# Patient Record
Sex: MALE | Race: WHITE | NOT HISPANIC OR LATINO | Employment: FULL TIME | URBAN - METROPOLITAN AREA
[De-identification: names, ages, dates, MRNs, and addresses within clinical notes are randomized per-mention and may not be internally consistent; named-entity substitution may affect disease eponyms.]

---

## 2017-03-24 ENCOUNTER — OFFICE VISIT (OUTPATIENT)
Dept: URGENT CARE | Facility: URGENT CARE | Age: 27
End: 2017-03-24
Payer: COMMERCIAL

## 2017-03-24 VITALS
WEIGHT: 186 LBS | HEART RATE: 65 BPM | SYSTOLIC BLOOD PRESSURE: 133 MMHG | TEMPERATURE: 98.4 F | DIASTOLIC BLOOD PRESSURE: 83 MMHG | OXYGEN SATURATION: 99 % | BODY MASS INDEX: 25.94 KG/M2

## 2017-03-24 DIAGNOSIS — H66.005 RECURRENT ACUTE SUPPURATIVE OTITIS MEDIA WITHOUT SPONTANEOUS RUPTURE OF LEFT TYMPANIC MEMBRANE: Primary | ICD-10-CM

## 2017-03-24 DIAGNOSIS — H72.91 TYMPANIC MEMBRANE PERFORATION, RIGHT: ICD-10-CM

## 2017-03-24 DIAGNOSIS — J01.90 ACUTE SINUSITIS WITH SYMPTOMS > 10 DAYS: ICD-10-CM

## 2017-03-24 PROCEDURE — 99214 OFFICE O/P EST MOD 30 MIN: CPT | Performed by: FAMILY MEDICINE

## 2017-03-24 NOTE — PROGRESS NOTES
SUBJECTIVE:                                                    Julio Oh is a 26 year old male who presents to clinic today for the following health issues:      Ear Problem      Duration: 1 week     Description  ear pain left    Severity: moderate    Accompanying signs and symptoms: None    History (predisposing factors):  none    Precipitating or alleviating factors: None    Therapies tried and outcome:  none     Started 3 weeks ago started having some congestion runny nose and fatigue  Thought went away although still having some congestion  But then the last week the left ear started to hurt and feel plugged and can't hear it out of it  No drainage    Tried over the counter medications without relief  No  chest pain or shortness of breath   No rash  Ill-contacts: none   Because of persistent and worsening symptoms came in to be seen    Problem list and histories reviewed & adjusted, as indicated.  Additional history: as documented    Problem list, Medication list, Allergies, and Medical/Social/Surgical histories reviewed in EPIC and updated as appropriate.    ROS:  Constitutional, HEENT, cardiovascular, pulmonary, gi and gu systems are negative, except as otherwise noted.    OBJECTIVE:                                                    /83  Pulse 65  Temp 98.4  F (36.9  C) (Oral)  Wt 186 lb (84.4 kg)  SpO2 99%  BMI 25.94 kg/m2  Body mass index is 25.94 kg/(m^2).  GENERAL: healthy, alert and no distress  EYES: pink palpebral conjunctiva, anicteric sclera, pupils equally reactive to light and accomodation, extraocular muscles intact full and equal.  ENT: midline nasal septum, positive  nasal congestion   Left ear:no tragal tenderness, no mastoid tenderness dull, erythematous and yellowish effusion tympaninc membrane   Right ear: no tragal tenderness, no mastoid tenderness yellow, distorted and scarred tympaninc membrane   NECK: no adenopathy, no asymmetry or  masses  RESP: lungs clear to  auscultation - no rales, rhonchi or wheezes  CV: regular rate and rhythm, normal S1 S2, no S3 or S4, no murmur, click or rub, no peripheral edema and peripheral pulses strong  ABDOMEN: soft, nontender, no hepatosplenomegaly, no masses and bowel sounds normal  MS: no gross musculoskeletal defects noted, no edema  NEURO: Normal strength and tone, mentation intact and speech normal    Diagnostic Test Results:  No results found for this or any previous visit (from the past 24 hour(s)).     ASSESSMENT/PLAN:                                                      No diagnosis found.      ICD-10-CM    1. Recurrent acute suppurative otitis media without spontaneous rupture of left tympanic membrane H66.005 amoxicillin-clavulanate (AUGMENTIN) 875-125 MG per tablet   2. Tympanic membrane perforation, right H72.91    3. Acute sinusitis with symptoms > 10 days J01.90 amoxicillin-clavulanate (AUGMENTIN) 875-125 MG per tablet     Prescribed with augmentin  Side effects discussed warned about GI side effects and risk of cdiff.  Recommend follow up with his ENT for his TYMPANINC MEMBRANE perforation. Per patient he is due for follow up.    Needs ear recheck  in 2-4 weeks  Adverse reactions of medications discussed.  Over the counter medications discussed.   Aware to come back in if with worsening symptoms or if no relief despite treatment plan  Patient voiced understanding and had no further questions.     MD Rina Sarabia MD  [facility]

## 2017-03-24 NOTE — NURSING NOTE
"Chief Complaint   Patient presents with     Ear Problem       Initial /83  Pulse 65  Temp 98.4  F (36.9  C) (Oral)  Wt 186 lb (84.4 kg)  SpO2 99%  BMI 25.94 kg/m2 Estimated body mass index is 25.94 kg/(m^2) as calculated from the following:    Height as of 12/27/16: 5' 11\" (1.803 m).    Weight as of this encounter: 186 lb (84.4 kg).  BP completed using cuff size: nelson HERNANDEZ CMA ([facility])  6:00 PM 3/24/2017    "

## 2017-03-24 NOTE — MR AVS SNAPSHOT
After Visit Summary   3/24/2017    Julio Oh    MRN: [id number]           Patient Information     Date Of Birth          1990        Visit Information        Provider Department      3/24/2017 5:30 PM Rina Hoang MD [facility]        Today's Diagnoses     Recurrent acute suppurative otitis media without spontaneous rupture of left tympanic membrane    -  1    Tympanic membrane perforation, right        Acute sinusitis with symptoms > 10 days           Follow-ups after your visit        Who to contact     If you have questions or need follow up information about today's clinic visit or your schedule please contact [facility] directly at [phone].  Normal or non-critical lab and imaging results will be communicated to you by MyChart, letter or phone within 4 business days after the clinic has received the results. If you do not hear from us within 7 days, please contact the clinic through Fivetranhart or phone. If you have a critical or abnormal lab result, we will notify you by phone as soon as possible.  Submit refill requests through D-Sight or call your pharmacy and they will forward the refill request to us. Please allow 3 business days for your refill to be completed.          Additional Information About Your Visit        MyChart Information     D-Sight gives you secure access to your electronic health record. If you see a primary care provider, you can also send messages to your care team and make appointments. If you have questions, please call your primary care clinic.  If you do not have a primary care provider, please call [phone] and they will assist you.        Care EveryWhere ID     This is your Care EveryWhere ID. This could be used by other organizations to access your [facility] medical records  [id number]        Your Vitals Were     Pulse Temperature Pulse Oximetry BMI (Body Mass Index)          65 98.4  F (36.9  C) (Oral) 99%  25.94 kg/m2         Blood Pressure from Last 3 Encounters:   03/24/17 133/83   12/27/16 126/80   10/31/16 126/70    Weight from Last 3 Encounters:   03/24/17 186 lb (84.4 kg)   12/27/16 205 lb 8 oz (93.2 kg)   10/31/16 204 lb 9.6 oz (92.8 kg)              Today, you had the following     No orders found for display         Today's Medication Changes          These changes are accurate as of: 3/24/17 10:24 PM.  If you have any questions, ask your nurse or doctor.               Start taking these medicines.        Dose/Directions    amoxicillin-clavulanate 875-125 MG per tablet   Commonly known as:  AUGMENTIN   Used for:  Recurrent acute suppurative otitis media without spontaneous rupture of left tympanic membrane, Acute sinusitis with symptoms > 10 days   Started by:  Rina Hoang MD        Dose:  1 tablet   Take 1 tablet by mouth 2 times daily for 10 days   Quantity:  20 tablet   Refills:  0            Where to get your medicines      These medications were sent to [facility] [address] [place] [address] [address] [facility] AT SEC of [facility] [place]  [address] [facility] [address]     Phone:  [phone]     amoxicillin-clavulanate 875-125 MG per tablet                Primary Care Provider Office Phone # Fax #    Jenifer Lavon Boss [phone]       [address] [address]        Thank you!     Thank you for choosing [facility]  for your care. Our goal is always to provide you with excellent care. Hearing back from our patients is one way we can continue to improve our services. Please take a few minutes to complete the written survey that you may receive in the mail after your visit with us. Thank you!             Your Updated Medication List - Protect others around you: Learn how to safely use, store and throw away your medicines at www.disposemymeds.org.          This list is accurate as  of: 3/24/17 10:24 PM.  Always use your most recent med list.                   Brand Name Dispense Instructions for use    amoxicillin-clavulanate 875-125 MG per tablet    AUGMENTIN    20 tablet    Take 1 tablet by mouth 2 times daily for 10 days       buPROPion 75 MG tablet    WELLBUTRIN    180 tablet    Take 1 tablet (75 mg) by mouth 2 times daily       ibuprofen 800 MG tablet    ADVIL/MOTRIN    30 tablet    Take 1 tablet (800 mg) by mouth every 8 hours as needed for moderate pain       LORazepam 1 MG tablet    ATIVAN    30 tablet    Take 0.5-1 tablets (0.5-1 mg) by mouth 3 times daily as needed for anxiety       MULTIVITAMIN & MINERAL PO      Take  by mouth.       PARoxetine 40 MG tablet    PAXIL    90 tablet    Take 1 tablet (40 mg) by mouth At Bedtime       podofilox 0.5 % external solution    CONDYLOX    3.5 mL    Apply topically 2 times daily 3 days per week       promethazine 25 MG tablet    PHENERGAN    15 tablet    Take 1 tablet (25 mg) by mouth every 6 hours as needed for nausea       traZODone 50 MG tablet    DESYREL    90 tablet    Take 1 tablet (50 mg) by mouth nightly as needed for sleep

## 2017-04-28 ENCOUNTER — MYC MEDICAL ADVICE (OUTPATIENT)
Dept: FAMILY MEDICINE | Facility: CLINIC | Age: 27
End: 2017-04-28

## 2017-04-28 DIAGNOSIS — F51.01 PRIMARY INSOMNIA: ICD-10-CM

## 2017-04-28 NOTE — TELEPHONE ENCOUNTER
traZODone (DESYREL) 50 MG tablet       Last Written Prescription Date: 10/31/16  Last Fill Quantity: 90; # refills: 1  Last Office Visit with G, P or [facility] prescribing provider:  12/27/16 Dr. Boss     Next 5 appointments (look out 90 days)     May 15, 2017  4:30 PM CDT   Return Visit with Andres Joyce MD   [facility] ([address] [address]   [phone]                   Last PHQ-9 score on record=   PHQ-9 SCORE 2/15/2016   Total Score -   Total Score 1       Lab Results   Component Value Date    AST 16 11/21/2014     Lab Results   Component Value Date    ALT 27 11/21/2014

## 2017-05-01 RX ORDER — TRAZODONE HYDROCHLORIDE 50 MG/1
TABLET, FILM COATED ORAL
Qty: 90 TABLET | Refills: 1 | Status: SHIPPED | OUTPATIENT
Start: 2017-05-01 | End: 2017-11-07

## 2017-05-01 NOTE — TELEPHONE ENCOUNTER
traZODone (DESYREL) 50 MG tablet       Last Written Prescription Date: 10/31/16  Last Fill Quantity: 90; # refills: 1  Last Office Visit with G, P or [facility] prescribing provider:  3/24/17 Dr. Boss     Next 5 appointments (look out 90 days)     May 15, 2017  4:30 PM CDT   Return Visit with Andres Joyce MD   [facility] ([address] [id number] [phone]                   Last PHQ-9 score on record=   PHQ-9 SCORE 2/15/2016   Total Score -   Total Score 1       Lab Results   Component Value Date    AST 16 11/21/2014     Lab Results   Component Value Date    ALT 27 11/21/2014     Kya Odell

## 2017-05-02 RX ORDER — TRAZODONE HYDROCHLORIDE 50 MG/1
50 TABLET, FILM COATED ORAL
Qty: 90 TABLET | Refills: 1 | Status: SHIPPED | OUTPATIENT
Start: 2017-05-02 | End: 2017-07-05

## 2017-05-02 NOTE — TELEPHONE ENCOUNTER
Routing refill request to provider for review/approval because:  Labs not current:  ALT, AST  Cori Martinez RN

## 2017-06-22 DIAGNOSIS — F43.22 ADJUSTMENT DISORDER WITH ANXIETY: ICD-10-CM

## 2017-06-22 NOTE — TELEPHONE ENCOUNTER
PARoxetine (PAXIL) 40 MG tablet     Last Written Prescription Date: 10/31/16  Last Fill Quantity: 90, # refills: 1  Last Office Visit with G primary care provider:  12/27/16        Last PHQ-9 score on record=   PHQ-9 SCORE 2/15/2016   Total Score -   Total Score 1

## 2017-06-26 ENCOUNTER — MYC MEDICAL ADVICE (OUTPATIENT)
Dept: FAMILY MEDICINE | Facility: CLINIC | Age: 27
End: 2017-06-26

## 2017-06-26 DIAGNOSIS — F43.22 ADJUSTMENT DISORDER WITH ANXIETY: ICD-10-CM

## 2017-06-26 RX ORDER — PAROXETINE 40 MG/1
TABLET, FILM COATED ORAL
Qty: 30 TABLET | Refills: 0 | Status: SHIPPED | OUTPATIENT
Start: 2017-06-26 | End: 2017-07-05

## 2017-06-26 NOTE — TELEPHONE ENCOUNTER
Per 10/31/16 OV to address this Dx, pt due for 6 month follow up.    Medication is being filled for 1 time refill only. Patient needs to be seen for office visit for further refills.    Dong Hill RN

## 2017-06-28 NOTE — TELEPHONE ENCOUNTER
Paxil     Last Written Prescription Date: 06/26/17  Last Fill Quantity: 30, # refills: 0  Last Office Visit with [facility] primary care provider:  12/27/16        Last PHQ-9 score on record=   PHQ-9 SCORE 2/15/2016   Total Score -   Total Score 1         Patient is due for a follow up visit.  Please call patient and schedule an appointment.  Then route request to provider.    Shona Rendon RN

## 2017-06-29 RX ORDER — PAROXETINE 40 MG/1
40 TABLET, FILM COATED ORAL AT BEDTIME
Qty: 90 TABLET | Refills: 0 | Status: SHIPPED | OUTPATIENT
Start: 2017-06-29 | End: 2017-07-05

## 2017-07-05 ENCOUNTER — OFFICE VISIT (OUTPATIENT)
Dept: FAMILY MEDICINE | Facility: CLINIC | Age: 27
End: 2017-07-05
Payer: COMMERCIAL

## 2017-07-05 VITALS
OXYGEN SATURATION: 97 % | WEIGHT: 187.7 LBS | TEMPERATURE: 98.7 F | RESPIRATION RATE: 16 BRPM | HEART RATE: 52 BPM | BODY MASS INDEX: 26.28 KG/M2 | HEIGHT: 71 IN | SYSTOLIC BLOOD PRESSURE: 118 MMHG | DIASTOLIC BLOOD PRESSURE: 68 MMHG

## 2017-07-05 DIAGNOSIS — F43.22 ADJUSTMENT DISORDER WITH ANXIETY: ICD-10-CM

## 2017-07-05 DIAGNOSIS — A63.0 GENITAL WARTS: ICD-10-CM

## 2017-07-05 PROCEDURE — 99213 OFFICE O/P EST LOW 20 MIN: CPT | Performed by: FAMILY MEDICINE

## 2017-07-05 RX ORDER — BUPROPION HYDROCHLORIDE 75 MG/1
75 TABLET ORAL 2 TIMES DAILY
Qty: 180 TABLET | Refills: 3 | Status: SHIPPED | OUTPATIENT
Start: 2017-07-05 | End: 2018-08-24

## 2017-07-05 RX ORDER — PAROXETINE 40 MG/1
TABLET, FILM COATED ORAL
Qty: 90 TABLET | Refills: 3 | Status: SHIPPED | OUTPATIENT
Start: 2017-07-05 | End: 2017-09-25

## 2017-07-05 RX ORDER — PODOFILOX 5 MG/ML
SOLUTION TOPICAL 2 TIMES DAILY
Qty: 3.5 ML | Refills: 2 | Status: SHIPPED | OUTPATIENT
Start: 2017-07-05 | End: 2017-09-25

## 2017-07-05 ASSESSMENT — PAIN SCALES - GENERAL: PAINLEVEL: NO PAIN (0)

## 2017-07-05 ASSESSMENT — ANXIETY QUESTIONNAIRES
6. BECOMING EASILY ANNOYED OR IRRITABLE: SEVERAL DAYS
3. WORRYING TOO MUCH ABOUT DIFFERENT THINGS: SEVERAL DAYS
GAD7 TOTAL SCORE: 8
1. FEELING NERVOUS, ANXIOUS, OR ON EDGE: SEVERAL DAYS
2. NOT BEING ABLE TO STOP OR CONTROL WORRYING: SEVERAL DAYS
5. BEING SO RESTLESS THAT IT IS HARD TO SIT STILL: SEVERAL DAYS
IF YOU CHECKED OFF ANY PROBLEMS ON THIS QUESTIONNAIRE, HOW DIFFICULT HAVE THESE PROBLEMS MADE IT FOR YOU TO DO YOUR WORK, TAKE CARE OF THINGS AT HOME, OR GET ALONG WITH OTHER PEOPLE: SOMEWHAT DIFFICULT
7. FEELING AFRAID AS IF SOMETHING AWFUL MIGHT HAPPEN: SEVERAL DAYS

## 2017-07-05 ASSESSMENT — PATIENT HEALTH QUESTIONNAIRE - PHQ9: 5. POOR APPETITE OR OVEREATING: MORE THAN HALF THE DAYS

## 2017-07-05 NOTE — PROGRESS NOTES
"SUBJECTIVE:  Here today in follow-up of anxiety and adjustment disorder  Doing well.  Reports no interval health concerns.   Patient reports no side effects from medications, and desires no change in therapy.   Uses Condylox periodically for recurrence of genital warts. Works quite well.    Review of systems otherwise negative.  Past medical, family, and social history reviewed and updated in chart.    OBJECTIVE:  /68 (BP Location: Right arm, Patient Position: Right side, Cuff Size: Adult Regular)  Pulse 52  Temp 98.7  F (37.1  C) (Oral)  Resp 16  Ht 1.803 m (5' 11\")  Wt 85.1 kg (187 lb 11.2 oz)  SpO2 97%  BMI 26.18 kg/m2  Psych: Alert and oriented times 3; coherent speech, normal   rate and volume, able to articulate logical thoughts, able   to abstract reason, no tangential thoughts, no hallucinations   or delusions  His affect is upbeat and appropriate  S1 and S2 normal, no murmurs, clicks, gallops or rubs. Regular rate and rhythm. Chest is clear; no wheezes or rales. No edema or JVD.  Past labs reviewed with the patient.      ASSESSMENT / PLAN:  (F43.22) Adjustment disorder with anxiety  Comment: Doing well on current dosage. Refill.  Plan: buPROPion (WELLBUTRIN) 75 MG tablet, PARoxetine        (PAXIL) 40 MG tablet            (A63.0) Genital warts  Comment: Intermittent usage. Refilled.  Plan: podofilox (CONDYLOX) 0.5 % external solution            Follow up annually  GABY Boss MD    (Chart documentation completed in part with Dragon voice-recognition software.  Even though reviewed some grammatical, spelling, and word errors may remain.)     "

## 2017-07-05 NOTE — NURSING NOTE
"Chief Complaint   Patient presents with     Recheck Medication       Initial /68 (BP Location: Right arm, Patient Position: Right side, Cuff Size: Adult Regular)  Pulse 52  Temp 98.7  F (37.1  C) (Oral)  Resp 16  Ht 1.803 m (5' 11\")  Wt 85.1 kg (187 lb 11.2 oz)  SpO2 97%  BMI 26.18 kg/m2 Estimated body mass index is 26.18 kg/(m^2) as calculated from the following:    Height as of this encounter: 1.803 m (5' 11\").    Weight as of this encounter: 85.1 kg (187 lb 11.2 oz).  Medication Reconciliation: complete     Will Shade MISHRA      "

## 2017-07-05 NOTE — MR AVS SNAPSHOT
"              After Visit Summary   7/5/2017    Julio Oh    MRN: [id number]           Patient Information     Date Of Birth          1990        Visit Information        Provider Department      7/5/2017 11:20 AM Jenifer Boss MD [facility]        Today's Diagnoses     Adjustment disorder with anxiety        Genital warts           Follow-ups after your visit        Follow-up notes from your care team     Return in about 1 year (around 7/5/2018).      Who to contact     If you have questions or need follow up information about today's clinic visit or your schedule please contact [facility] directly at [phone].  Normal or non-critical lab and imaging results will be communicated to you by MyChart, letter or phone within 4 business days after the clinic has received the results. If you do not hear from us within 7 days, please contact the clinic through Glance Apphart or phone. If you have a critical or abnormal lab result, we will notify you by phone as soon as possible.  Submit refill requests through DeckDAQ or call your pharmacy and they will forward the refill request to us. Please allow 3 business days for your refill to be completed.          Additional Information About Your Visit        MyChart Information     DeckDAQ gives you secure access to your electronic health record. If you see a primary care provider, you can also send messages to your care team and make appointments. If you have questions, please call your primary care clinic.  If you do not have a primary care provider, please call [phone] and they will assist you.        Care EveryWhere ID     This is your Care EveryWhere ID. This could be used by other organizations to access your [facility] medical records  [id number]        Your Vitals Were     Pulse Temperature Respirations Height Pulse Oximetry BMI (Body Mass Index)    52 98.7  F (37.1  C) (Oral) 16 1.803 m (5' 11\") 97% 26.18 kg/m2    "    Blood Pressure from Last 3 Encounters:   07/05/17 118/68   03/24/17 133/83   12/27/16 126/80    Weight from Last 3 Encounters:   07/05/17 85.1 kg (187 lb 11.2 oz)   03/24/17 84.4 kg (186 lb)   12/27/16 93.2 kg (205 lb 8 oz)              Today, you had the following     No orders found for display         Today's Medication Changes          These changes are accurate as of: 7/5/17 12:02 PM.  If you have any questions, ask your nurse or doctor.               These medicines have changed or have updated prescriptions.        Dose/Directions    PARoxetine 40 MG tablet   Commonly known as:  PAXIL   This may have changed:  See the new instructions.   Used for:  Adjustment disorder with anxiety   Changed by:  Jenifer Boss MD        TAKE 1 TABLET (40MG) BY MOUTH EVERY NIGHT AT BEDTIME   Quantity:  90 tablet   Refills:  3            Where to get your medicines      These medications were sent to [id number] - [place] [id number]     Phone:  [phone]     buPROPion 75 MG tablet    PARoxetine 40 MG tablet    podofilox 0.5 % external solution                Primary Care Provider Office Phone # Fax #    Jenifer Lavon Boss [phone]       [place] [id number]        Equal Access to Services     [facility]: Hadii mason valdez hadharmeeto Soaditi, waaxda luqadaha, qaybta kaalmada adenicholyada, celestino jorge. So [facility] [phone].    ATENCIÓN: Si habla español, tiene a mac disposición servicios gratuitos de asistencia lingüística. Llame al [phone].    We comply with applicable federal civil rights laws and [place] laws. We do not discriminate on the basis of race, color, national origin, age, disability sex, sexual orientation or gender identity.            Thank you!     Thank you for choosing [facility]  [place]  for your care. Our goal is always to provide you with excellent care. Hearing back from our patients is one way we can continue to improve our services. Please take a few minutes to complete the written survey that you may receive in the mail after your visit with us. Thank you!             Your Updated Medication List - Protect others around you: Learn how to safely use, store and throw away your medicines at www.disposemymeds.org.          This list is accurate as of: 7/5/17 12:02 PM.  Always use your most recent med list.                   Brand Name Dispense Instructions for use Diagnosis    buPROPion 75 MG tablet    WELLBUTRIN    180 tablet    Take 1 tablet (75 mg) by mouth 2 times daily    Adjustment disorder with anxiety       ibuprofen 800 MG tablet    ADVIL/MOTRIN    30 tablet    Take 1 tablet (800 mg) by mouth every 8 hours as needed for moderate pain    Acute sinusitis with symptoms > 10 days       LORazepam 1 MG tablet    ATIVAN    30 tablet    Take 0.5-1 tablets (0.5-1 mg) by mouth 3 times daily as needed for anxiety    Adjustment disorder with anxiety       MULTIVITAMIN & MINERAL PO      Take  by mouth.        PARoxetine 40 MG tablet    PAXIL    90 tablet    TAKE 1 TABLET (40MG) BY MOUTH EVERY NIGHT AT BEDTIME    Adjustment disorder with anxiety       podofilox 0.5 % external solution    CONDYLOX    3.5 mL    Apply topically 2 times daily 3 days per week    Genital warts       traZODone 50 MG tablet    DESYREL    90 tablet    TAKE 1 TABLET (50MG) BY MOUTH NIGHTLY FOR SLEEP    Primary insomnia

## 2017-07-06 ASSESSMENT — PATIENT HEALTH QUESTIONNAIRE - PHQ9: SUM OF ALL RESPONSES TO PHQ QUESTIONS 1-9: 4

## 2017-07-06 ASSESSMENT — ANXIETY QUESTIONNAIRES: GAD7 TOTAL SCORE: 8

## 2017-08-29 DIAGNOSIS — F43.22 ADJUSTMENT DISORDER WITH ANXIETY: ICD-10-CM

## 2017-08-29 RX ORDER — PAROXETINE 40 MG/1
40 TABLET, FILM COATED ORAL EVERY MORNING
Qty: 90 TABLET | Refills: 2 | Status: SHIPPED | OUTPATIENT
Start: 2017-08-29 | End: 2018-02-19

## 2017-08-29 NOTE — TELEPHONE ENCOUNTER
PARoxetine (PAXIL) 40 MG tablet     Last Written Prescription Date: 7/5/17  Last Fill Quantity: 90, # refills: 3  Last Office Visit with G primary care provider:  7/5/17        Last PHQ-9 score on record=   PHQ-9 SCORE 7/5/2017   Total Score -   Total Score 4         [facility]

## 2017-09-25 ENCOUNTER — RADIANT APPOINTMENT (OUTPATIENT)
Dept: GENERAL RADIOLOGY | Facility: OTHER | Age: 27
End: 2017-09-25
Attending: ORTHOPAEDIC SURGERY
Payer: COMMERCIAL

## 2017-09-25 ENCOUNTER — OFFICE VISIT (OUTPATIENT)
Dept: ORTHOPEDICS | Facility: OTHER | Age: 27
End: 2017-09-25
Payer: COMMERCIAL

## 2017-09-25 VITALS — TEMPERATURE: 97.7 F | BODY MASS INDEX: 25.06 KG/M2 | WEIGHT: 179 LBS | HEIGHT: 71 IN

## 2017-09-25 DIAGNOSIS — M25.561 RIGHT KNEE PAIN: ICD-10-CM

## 2017-09-25 DIAGNOSIS — M25.561 ACUTE PAIN OF RIGHT KNEE: Primary | ICD-10-CM

## 2017-09-25 PROCEDURE — 73564 X-RAY EXAM KNEE 4 OR MORE: CPT | Mod: RT

## 2017-09-25 PROCEDURE — 99204 OFFICE O/P NEW MOD 45 MIN: CPT | Performed by: ORTHOPAEDIC SURGERY

## 2017-09-25 ASSESSMENT — PAIN SCALES - GENERAL: PAINLEVEL: SEVERE PAIN (6)

## 2017-09-25 NOTE — NURSING NOTE
"Chief Complaint   Patient presents with     Knee Pain     right knee pain x 1 month     Consult       Initial Temp 97.7  F (36.5  C) (Temporal)  Ht 5' 11\" (1.803 m)  Wt 179 lb (81.2 kg)  BMI 24.97 kg/m2 Estimated body mass index is 24.97 kg/(m^2) as calculated from the following:    Height as of this encounter: 5' 11\" (1.803 m).    Weight as of this encounter: 179 lb (81.2 kg).  Medication Reconciliation: complete   Laly/STACI     "

## 2017-09-25 NOTE — PROGRESS NOTES
ORTHOPEDIC CONSULT      Chief Complaint: Julio Oh is a 26 year old male who is being seen for   Chief Complaint   Patient presents with     Knee Pain     right knee pain x 1 month     Consult     Julio Oh is a 26 year old male who is seen in consultation at the request of self for evaluation of knee pain.      History of Present Illness:   Mechanism of Injury: A twisting/pivioting event started the pain.  Patient participating in [employer] he described his Knee/leg being twisted in described as a figure 4 type position, a 250 pound participant was on top of him, and as his knee was pushed down he felt a pop in his lateral knee/distal right leg and had immediate pain.  Has been walking on it.  This was 2 months ago and not getting better.   Location: right knee, lower leg lateral, down leg to ankle/foot  Duration of Pain:  2 months  Rating of Pain:  moderate.    Pain Quality: dull and shooting at times  Pain is better with: Rest, Ice, Aleve and brace  Pain is worse with:  weightbearing, lateral movements, working out  Treatment so far consists of:  Ice, Aleve, brace.   Associated Features: Swelling initially, instability and knee giving out x 2, feels especially unstable with lateral movements. Denies pain from the back to the knee  Prior history of related problems:   No previous problem in the affected area.  The pain is limiting sports/physical activity.   Here for Orthopedic consultation.  The pain and instability is getting worse.    Patient's past medical, surgical, social and family histories reviewed.     Past Medical History:   Diagnosis Date     NO ACTIVE PROBLEMS        Past Surgical History:   Procedure Laterality Date     HC CREATE EARDRUM OPENING,GEN ANESTH      P.E. Tubes     TYMPANOPLASTY Right 7/21/2016    Procedure: TYMPANOPLASTY;  Surgeon: Andres Joyce MD;  Location: [facility] OR       Medications:    Current Outpatient Prescriptions on File Prior to Visit:  PARoxetine (PAXIL) 40 MG  "tablet Take 1 tablet (40 mg) by mouth every morning   buPROPion (WELLBUTRIN) 75 MG tablet Take 1 tablet (75 mg) by mouth 2 times daily   traZODone (DESYREL) 50 MG tablet TAKE 1 TABLET (50MG) BY MOUTH NIGHTLY FOR SLEEP   ibuprofen (ADVIL,MOTRIN) 800 MG tablet Take 1 tablet (800 mg) by mouth every 8 hours as needed for moderate pain   [DISCONTINUED] PARoxetine (PAXIL) 40 MG tablet TAKE 1 TABLET (40MG) BY MOUTH EVERY NIGHT AT BEDTIME   LORazepam (ATIVAN) 1 MG tablet Take 0.5-1 tablets (0.5-1 mg) by mouth 3 times daily as needed for anxiety   Multiple Vitamins-Minerals (MULTIVITAMIN & MINERAL PO) Take  by mouth.     No current facility-administered medications on file prior to visit.     Allergies   Allergen Reactions     No Known Drug Allergies        Social History     Occupational History     student      [employer]     Social History Main Topics     Smoking status: Never Smoker     Smokeless tobacco: Never Used      Comment: no nicotine exposure     Alcohol use No     Drug use: No     Sexual activity: Yes     Partners: Female     Birth control/ protection: Pill, Condom       Family History   Problem Relation Age of Onset     DIABETES Maternal Grandmother      Type II     Cancer - colorectal Maternal Grandfather      Eye Disorder Maternal Grandfather      cataracts     Prostate Cancer Maternal Grandfather      HEART DISEASE Paternal Grandmother      bypass     HEART DISEASE Paternal Grandfather      bypass       REVIEW OF SYSTEMS  10 point review systems performed otherwise negative as noted as per history of present illness.    Physical Exam:  Vitals: Temp 97.7  F (36.5  C) (Temporal)  Ht 1.803 m (5' 11\")  Wt 81.2 kg (179 lb)  BMI 24.97 kg/m2  BMI= Body mass index is 24.97 kg/(m^2).  Constitutional: healthy, alert and no acute distress   Psychiatric: mentation appears normal and affect normal/bright  NEURO: no focal deficits  RESP: Normal with easy respirations and no use of accessory muscles to breathe, " no audible wheezing or retractions  CV: RLE: knee and down-  No edema         Regular rate and rhythm by palpation  SKIN: No erythema, rashes, excoriation, or breakdown. No evidence of infection.   JOINT/EXTREMITIES:right Knee Exam: Inspection: AP/lateral alignment normal, No effusion, No quad atrophy  Tender: lateral leg just distal to fibula head down to ankle.   Non-tender: lateral patellar facet, medial patellar facet, inferior pole patella, patella tendon, MCL, LCL, lateral joint line, medial joint line  Active Range of Motion: full flexion, no pain with flexion, full extension, no pain with extension  Strength: quad  5/5 when compared to left  Special tests: no apprehension with lateral stress of the patella, with Lachman's and Varus/Valgus stressing he does have increased laxity with varus as well as Lachman's, this is roughly equal to left side.  End points present. Mild vague pain with manipulating the leg during Katelin's but no joint line pain.   Lymph: No appreciated lymphedema  GAIT: not tested     Diagnostic Modalities:  right knee X-ray: No fracture, dislocation and or lesion. Normal alignment.  Joint space maintained no significant arthritis. No appreciable soft tissue abnormality  Independent visualization of the images was performed.      Impression: right knee/lateral distal extremity pain --- possible LCL vs peroneus longus injury    Plan:  All of the above pertinent physical exam and imaging modalities findings was reviewed with Julio.    Given that patient has had 2 months of pain and not improving and given xray unremarkable along with continued complaints of pain and instability recommending MRI to evaluate soft tissue of knee.  Patient does have increased laxity to right knee but this is also present on left knee.       Return to clinic to discuss test results, or sooner as needed for changes.  Re-x-ray on return: No    Scribed by:  Patrick Schmitt, APRN, CNP, DNP  8:11 AM  9/25/2017    I  attest I have seen and evaluated the patient.  I agree with above impression and plan.   Bert Nagy D.O.

## 2017-09-25 NOTE — MR AVS SNAPSHOT
After Visit Summary   9/25/2017    Julio Oh    MRN: [id number]           Patient Information     Date Of Birth          1990        Visit Information        Provider Department      9/25/2017 8:10 AM Nadir Nagy, DO [facility]        Today's Diagnoses     Acute pain of right knee    -  1       Follow-ups after your visit        Future tests that were ordered for you today     Open Future Orders        Priority Expected Expires Ordered    MR Knee Right w/o Contrast Routine  9/25/2018 9/25/2017            Who to contact     If you have questions or need follow up information about today's clinic visit or your schedule please contact [facility] directly at [phone].  Normal or non-critical lab and imaging results will be communicated to you by Labtriphart, letter or phone within 4 business days after the clinic has received the results. If you do not hear from us within 7 days, please contact the clinic through Labtriphart or phone. If you have a critical or abnormal lab result, we will notify you by phone as soon as possible.  Submit refill requests through [employer] or call your pharmacy and they will forward the refill request to us. Please allow 3 business days for your refill to be completed.          Additional Information About Your Visit        MyChart Information     [employer] gives you secure access to your electronic health record. If you see a primary care provider, you can also send messages to your care team and make appointments. If you have questions, please call your primary care clinic.  If you do not have a primary care provider, please call [phone] and they will assist you.        Care EveryWhere ID     This is your Care EveryWhere ID. This could be used by other organizations to access your [facility] medical records  [id number]        Your Vitals Were     Temperature Height BMI (Body Mass Index)             97.7  F (36.5  C)  "(Temporal) 5' 11\" (1.803 m) 24.97 kg/m2          Blood Pressure from Last 3 Encounters:   07/05/17 118/68   03/24/17 133/83   12/27/16 126/80    Weight from Last 3 Encounters:   09/25/17 179 lb (81.2 kg)   07/05/17 187 lb 11.2 oz (85.1 kg)   03/24/17 186 lb (84.4 kg)               Primary Care Provider Office Phone # Fax #    Jenifer Lavon Boss [phone] [address] [facility] [address]        Equal Access to Services     [facility]: Hadii mason valdez hadasho Soonelali, waaxda luqadaha, qaybta kaalmada adenicholyada, celestino ibanez . So [facility] [phone].    ATENCIÓN: Si habla español, tiene a mac disposición servicios gratuitos de asistencia lingüística. [facility] [phone].    We comply with applicable federal civil rights laws and [place] laws. We do not discriminate on the basis of race, color, national origin, age, disability sex, sexual orientation or gender identity.            Thank you!     Thank you for choosing [facility]  for your care. Our goal is always to provide you with excellent care. Hearing back from our patients is one way we can continue to improve our services. Please take a few minutes to complete the written survey that you may receive in the mail after your visit with us. Thank you!             Your Updated Medication List - Protect others around you: Learn how to safely use, store and throw away your medicines at www.disposemymeds.org.          This list is accurate as of: 9/25/17  8:49 AM.  Always use your most recent med list.                   Brand Name Dispense Instructions for use Diagnosis    buPROPion 75 MG tablet    WELLBUTRIN    180 tablet    Take 1 tablet (75 mg) by mouth 2 times daily    Adjustment disorder with anxiety       ibuprofen 800 MG tablet    ADVIL/MOTRIN    30 tablet    Take 1 tablet (800 mg) by mouth every 8 hours as needed for moderate pain    Acute sinusitis with symptoms > 10 days       " LORazepam 1 MG tablet    ATIVAN    30 tablet    Take 0.5-1 tablets (0.5-1 mg) by mouth 3 times daily as needed for anxiety    Adjustment disorder with anxiety       MULTIVITAMIN & MINERAL PO      Take  by mouth.        PARoxetine 40 MG tablet    PAXIL    90 tablet    Take 1 tablet (40 mg) by mouth every morning    Adjustment disorder with anxiety       traZODone 50 MG tablet    DESYREL    90 tablet    TAKE 1 TABLET (50MG) BY MOUTH NIGHTLY FOR SLEEP    Primary insomnia

## 2017-09-25 NOTE — LETTER
9/25/2017         RE: Julio Oh  [address]  [place] [address]        Dear Colleague,    Thank you for referring your patient, Julio Oh, to the [facility]. Please see a copy of my visit note below.    ORTHOPEDIC CONSULT      Chief Complaint: Julio Oh is a 26 year old male who is being seen for   Chief Complaint   Patient presents with     Knee Pain     right knee pain x 1 month     Consult     Julio Oh is a 26 year old male who is seen in consultation at the request of self for evaluation of knee pain.      History of Present Illness:   Mechanism of Injury: A twisting/pivioting event started the pain.  Patient participating in [employer] he described his Knee/leg being twisted in described as a figure 4 type position, a 250 pound participant was on top of him, and as his knee was pushed down he felt a pop in his lateral knee/distal right leg and had immediate pain.  Has been walking on it.  This was 2 months ago and not getting better.   Location: right knee, lower leg lateral, down leg to ankle/foot  Duration of Pain:  2 months  Rating of Pain:  moderate.    Pain Quality: dull and shooting at times  Pain is better with: Rest, Ice, Aleve and brace  Pain is worse with:  weightbearing, lateral movements, working out  Treatment so far consists of:  Ice, Aleve, brace.   Associated Features: Swelling initially, instability and knee giving out x 2, feels especially unstable with lateral movements. Denies pain from the back to the knee  Prior history of related problems:   No previous problem in the affected area.  The pain is limiting sports/physical activity.   Here for Orthopedic consultation.  The pain and instability is getting worse.    Patient's past medical, surgical, social and family histories reviewed.     Past Medical History:   Diagnosis Date     NO ACTIVE PROBLEMS        Past Surgical History:   Procedure Laterality Date     HC CREATE EARDRUM OPENING,GEN  "ANESTH      P.E. Tubes     TYMPANOPLASTY Right 7/21/2016    Procedure: TYMPANOPLASTY;  Surgeon: Andres Joyce MD;  Location: [facility] OR       Medications:    Current Outpatient Prescriptions on File Prior to Visit:  PARoxetine (PAXIL) 40 MG tablet Take 1 tablet (40 mg) by mouth every morning   buPROPion (WELLBUTRIN) 75 MG tablet Take 1 tablet (75 mg) by mouth 2 times daily   traZODone (DESYREL) 50 MG tablet TAKE 1 TABLET (50MG) BY MOUTH NIGHTLY FOR SLEEP   ibuprofen (ADVIL,MOTRIN) 800 MG tablet Take 1 tablet (800 mg) by mouth every 8 hours as needed for moderate pain   [DISCONTINUED] PARoxetine (PAXIL) 40 MG tablet TAKE 1 TABLET (40MG) BY MOUTH EVERY NIGHT AT BEDTIME   LORazepam (ATIVAN) 1 MG tablet Take 0.5-1 tablets (0.5-1 mg) by mouth 3 times daily as needed for anxiety   Multiple Vitamins-Minerals (MULTIVITAMIN & MINERAL PO) Take  by mouth.     No current facility-administered medications on file prior to visit.     Allergies   Allergen Reactions     No Known Drug Allergies        Social History     Occupational History     student      [employer]     Social History Main Topics     Smoking status: Never Smoker     Smokeless tobacco: Never Used      Comment: no nicotine exposure     Alcohol use No     Drug use: No     Sexual activity: Yes     Partners: Female     Birth control/ protection: Pill, Condom       Family History   Problem Relation Age of Onset     DIABETES Maternal Grandmother      Type II     Cancer - colorectal Maternal Grandfather      Eye Disorder Maternal Grandfather      cataracts     Prostate Cancer Maternal Grandfather      HEART DISEASE Paternal Grandmother      bypass     HEART DISEASE Paternal Grandfather      bypass       REVIEW OF SYSTEMS  10 point review systems performed otherwise negative as noted as per history of present illness.    Physical Exam:  Vitals: Temp 97.7  F (36.5  C) (Temporal)  Ht 1.803 m (5' 11\")  Wt 81.2 kg (179 lb)  BMI 24.97 kg/m2  BMI= Body mass index " is 24.97 kg/(m^2).  Constitutional: healthy, alert and no acute distress   Psychiatric: mentation appears normal and affect normal/bright  NEURO: no focal deficits  RESP: Normal with easy respirations and no use of accessory muscles to breathe, no audible wheezing or retractions  CV: RLE: knee and down-  No edema         Regular rate and rhythm by palpation  SKIN: No erythema, rashes, excoriation, or breakdown. No evidence of infection.   JOINT/EXTREMITIES:right Knee Exam: Inspection: AP/lateral alignment normal, No effusion, No quad atrophy  Tender: lateral leg just distal to fibula head down to ankle.   Non-tender: lateral patellar facet, medial patellar facet, inferior pole patella, patella tendon, MCL, LCL, lateral joint line, medial joint line  Active Range of Motion: full flexion, no pain with flexion, full extension, no pain with extension  Strength: quad  5/5 when compared to left  Special tests: no apprehension with lateral stress of the patella, with Lachman's and Varus/Valgus stressing he does have increased laxity with varus as well as Lachman's, this is roughly equal to left side.  End points present. Mild vague pain with manipulating the leg during Katelin's but no joint line pain.   Lymph: No appreciated lymphedema  GAIT: not tested     Diagnostic Modalities:  right knee X-ray: No fracture, dislocation and or lesion. Normal alignment.  Joint space maintained no significant arthritis. No appreciable soft tissue abnormality  Independent visualization of the images was performed.      Impression: right knee/lateral distal extremity pain --- possible LCL vs peroneus longus injury    Plan:  All of the above pertinent physical exam and imaging modalities findings was reviewed with Julio.    Given that patient has had 2 months of pain and not improving and given xray unremarkable along with continued complaints of pain and instability recommending MRI to evaluate soft tissue of knee.  Patient does have  increased laxity to right knee but this is also present on left knee.       Return to clinic to discuss test results, or sooner as needed for changes.  Re-x-ray on return: No    Scribed by:  Patrick Schmitt APRN, CNP, DNP  8:11 AM  9/25/2017    I attest I have seen and evaluated the patient.  I agree with above impression and plan.   Bert Nagy D.O.    Again, thank you for allowing me to participate in the care of your patient.        Sincerely,        Nadir Nagy, DO

## 2017-10-02 ENCOUNTER — RADIANT APPOINTMENT (OUTPATIENT)
Dept: MRI IMAGING | Facility: CLINIC | Age: 27
End: 2017-10-02
Attending: ORTHOPAEDIC SURGERY
Payer: COMMERCIAL

## 2017-10-02 DIAGNOSIS — M25.561 ACUTE PAIN OF RIGHT KNEE: ICD-10-CM

## 2017-10-02 PROCEDURE — 73721 MRI JNT OF LWR EXTRE W/O DYE: CPT | Mod: RT | Performed by: RADIOLOGY

## 2017-10-03 ENCOUNTER — TELEPHONE (OUTPATIENT)
Dept: FAMILY MEDICINE | Facility: CLINIC | Age: 27
End: 2017-10-03

## 2017-10-03 DIAGNOSIS — F43.22 ADJUSTMENT DISORDER WITH ANXIETY: ICD-10-CM

## 2017-10-04 RX ORDER — LORAZEPAM 1 MG/1
0.5-1 TABLET ORAL 3 TIMES DAILY PRN
Qty: 30 TABLET | Refills: 0 | Status: SHIPPED | OUTPATIENT
Start: 2017-10-04 | End: 2017-11-07

## 2017-10-16 ENCOUNTER — OFFICE VISIT (OUTPATIENT)
Dept: ORTHOPEDICS | Facility: OTHER | Age: 27
End: 2017-10-16
Payer: COMMERCIAL

## 2017-10-16 VITALS — HEIGHT: 71 IN | TEMPERATURE: 98 F | WEIGHT: 179 LBS | BODY MASS INDEX: 25.06 KG/M2

## 2017-10-16 DIAGNOSIS — S83.421A TEAR OF LATERAL COLLATERAL LIGAMENT OF RIGHT KNEE, INITIAL ENCOUNTER: Primary | ICD-10-CM

## 2017-10-16 PROCEDURE — 99213 OFFICE O/P EST LOW 20 MIN: CPT | Performed by: ORTHOPAEDIC SURGERY

## 2017-10-16 ASSESSMENT — PAIN SCALES - GENERAL: PAINLEVEL: MODERATE PAIN (5)

## 2017-10-16 NOTE — PROGRESS NOTES
Office Visit-Follow up    Chief Complaint: Julio Oh is a 26 year old male who is being seen for   Chief Complaint   Patient presents with     RECHECK     mri results of right knee       History of Present Illness:   Today's visit:  Returns for MRI results. Unchanged issues.  September 25, 2017 visit:  Julio Oh is a 26 year old male who is seen in consultation at the request of self for evaluation of knee pain.  History of Present Illness:   Mechanism of Injury: A twisting/pivioting event started the pain.  Patient participating in [employer] he described his Knee/leg being twisted in described as a figure 4 type position, a 250 pound participant was on top of him, and as his knee was pushed down he felt a pop in his lateral knee/distal right leg and had immediate pain.  Has been walking on it.  This was 2 months ago and not getting better.   Location: right knee, lower leg lateral, down leg to ankle/foot  Duration of Pain:  2 months  Rating of Pain:  moderate.    Pain Quality: dull and shooting at times  Pain is better with: Rest, Ice, Aleve and brace  Pain is worse with:  weightbearing, lateral movements, working out  Treatment so far consists of:  Ice, Aleve, brace.   Associated Features: Swelling initially, instability and knee giving out x 2, feels especially unstable with lateral movements. Denies pain from the back to the knee  Prior history of related problems:   No previous problem in the affected area.  The pain is limiting sports/physical activity.   Here for Orthopedic consultation.  The pain and instability is getting worse.      REVIEW OF SYSTEMS  General: negative for, night sweats, dizziness, fatigue  Resp: No shortness of breath and no cough  CV: negative for chest pain, syncope or near-syncope  GI: negative for nausea, vomiting and diarrhea  : negative for dysuria and hematuria  Musculoskeletal: as above  Neurologic: negative for syncope   Hematologic: negative for bleeding  "disorder    Physical Exam:  Vitals: Temp 98  F (36.7  C) (Temporal)  Ht 5' 11\" (1.803 m)  Wt 179 lb (81.2 kg)  BMI 24.97 kg/m2  BMI= Body mass index is 24.97 kg/(m^2).  Constitutional: healthy, alert and no acute distress   Psychiatric: mentation appears normal and affect normal/bright  NEURO: no focal deficits  RESP: Normal with easy respirations and no use of accessory muscles to breathe, no audible wheezing or retractions  CV: RLE: no edema         SKIN: No erythema, rashes, excoriation, or breakdown. No evidence of infection.   JOINT/EXTREMITIES:right knee: Full range of motion. Continues to have some lateral instability with varus stressing at 0 and 30 . Approximately +2 instability however it is equal to the contralateral side. dialtest is equal.  GAIT: not tested             Diagnostic Modalities:  right knee MRI:  No fractures or dislocations. Complete tear involving the mid substance fiber of the fibular collateral ligament. There is some thickening of the distal conjoined tendon of the biceps from Aurelio tendon. Anterior cruciate ligament and PCL are intact. MCL intact. Menisci are intact. Articular cartilage is intact with no full-thickness chondromalacia.  Independent visualization of the images was performed.      Impression: right knee lateral collateral ligament tear    Plan:  All of the above pertinent physical exam and imaging modalities findings was reviewed with Julio.    Treatment options discussed. We discussed referral versus nonoperative care. Once reviewed his operative proceed with physical therapy and a hinge knee brace. If he continues to have issues I will refer him to my partner Dr. Oneill. He should avoid aggressive activities at this point.        Return to clinic 4-6 , week(s), PRN, or sooner as needed for changes. We'll have him see Dr. Oneill  Re-x-ray on return: No    Bert Nagy D.O.        "

## 2017-10-16 NOTE — MR AVS SNAPSHOT
"              After Visit Summary   10/16/2017    Julio Oh    MRN: [id number]           Patient Information     Date Of Birth          1990        Visit Information        Provider Department      10/16/2017 8:00 AM Nadir Nagy,  [facility]        Today's Diagnoses     Tear of lateral collateral ligament of right knee, initial encounter    -  1       Follow-ups after your visit        Additional Services     PHYSICAL THERAPY REFERRAL       *This therapy referral will be filtered to a centralized scheduling office at [facility] and the patient will receive a call to schedule an appointment at a [place] location most convenient for them. *     [facility] provides Physical Therapy evaluation and treatment and many specialty services across the [place] system.  If requesting a specialty program, please choose from the list below.    If you have not heard from the scheduling office within 2 business days, please call [phone] for all locations, with the exception of [place], please call [phone].  Treatment: Evaluation & Treatment  Special Instructions/Modalities: Core,Hamstring, Quad,Hip flexor.  Home program  Start with flexibility and work towards endurance    Special Programs:     Please be aware that coverage of these services is subject to the terms and limitations of your health insurance plan.  Call member services at your health plan with any benefit or coverage questions.      **Note to Provider:  If you are referring outside of [place] for the therapy appointment, please list the name of the location in the \"special instructions\" above, print the referral and give to the patient to schedule the appointment.                  Who to contact     If you have questions or need follow up information about today's clinic visit or your schedule please contact [facility] directly at [phone].  Normal or " "non-critical lab and imaging results will be communicated to you by MyChart, letter or phone within 4 business days after the clinic has received the results. If you do not hear from us within 7 days, please contact the clinic through [employer] or phone. If you have a critical or abnormal lab result, we will notify you by phone as soon as possible.  Submit refill requests through [employer] or call your pharmacy and they will forward the refill request to us. Please allow 3 business days for your refill to be completed.          Additional Information About Your Visit        [employer] Information     [employer] gives you secure access to your electronic health record. If you see a primary care provider, you can also send messages to your care team and make appointments. If you have questions, please call your primary care clinic.  If you do not have a primary care provider, please call [phone] and they will assist you.        Care EveryWhere ID     This is your Care EveryWhere ID. This could be used by other organizations to access your [place] medical records  [id number]        Your Vitals Were     Temperature Height BMI (Body Mass Index)             98  F (36.7  C) (Temporal) 5' 11\" (1.803 m) 24.97 kg/m2          Blood Pressure from Last 3 Encounters:   07/05/17 118/68   03/24/17 133/83   12/27/16 126/80    Weight from Last 3 Encounters:   10/16/17 179 lb (81.2 kg)   09/25/17 179 lb (81.2 kg)   07/05/17 187 lb 11.2 oz (85.1 kg)              We Performed the Following     PHYSICAL THERAPY REFERRAL        Primary Care Provider Office Phone # Fax #    Jeniferkris Boss [phone] [address] [facility] [address]        Equal Access to Services     [facility] : Hadii mason [place], wachristianda luqadaha, qaybta kaalmada dom, celestino jorge. So [facility] [phone].    ATENCIÓN: Si habla español, tiene a mac disposición servicios gratuitos de " asistencia lingüística. Les al [phone].    We comply with applicable federal civil rights laws and [place] laws. We do not discriminate on the basis of race, color, national origin, age, disability, sex, sexual orientation, or gender identity.            Thank you!     Thank you for choosing [facility]  for your care. Our goal is always to provide you with excellent care. Hearing back from our patients is one way we can continue to improve our services. Please take a few minutes to complete the written survey that you may receive in the mail after your visit with us. Thank you!             Your Updated Medication List - Protect others around you: Learn how to safely use, store and throw away your medicines at www.disposemymeds.org.          This list is accurate as of: 10/16/17  8:17 AM.  Always use your most recent med list.                   Brand Name Dispense Instructions for use Diagnosis    buPROPion 75 MG tablet    WELLBUTRIN    180 tablet    Take 1 tablet (75 mg) by mouth 2 times daily    Adjustment disorder with anxiety       ibuprofen 800 MG tablet    ADVIL/MOTRIN    30 tablet    Take 1 tablet (800 mg) by mouth every 8 hours as needed for moderate pain    Acute sinusitis with symptoms > 10 days       LORazepam 1 MG tablet    ATIVAN    30 tablet    Take 0.5-1 tablets (0.5-1 mg) by mouth 3 times daily as needed for anxiety NEEDS FOLLOWUP WITH DR GUERRERO PRIOR TO ANY ADDITIONAL REFILLS    Adjustment disorder with anxiety       MULTIVITAMIN & MINERAL PO      Take  by mouth.        PARoxetine 40 MG tablet    PAXIL    90 tablet    Take 1 tablet (40 mg) by mouth every morning    Adjustment disorder with anxiety       traZODone 50 MG tablet    DESYREL    90 tablet    TAKE 1 TABLET (50MG) BY MOUTH NIGHTLY FOR SLEEP    Primary insomnia

## 2017-10-16 NOTE — NURSING NOTE
"Chief Complaint   Patient presents with     RECHECK     mri results of right knee       Initial Temp 98  F (36.7  C) (Temporal)  Ht 5' 11\" (1.803 m)  Wt 179 lb (81.2 kg)  BMI 24.97 kg/m2 Estimated body mass index is 24.97 kg/(m^2) as calculated from the following:    Height as of this encounter: 5' 11\" (1.803 m).    Weight as of this encounter: 179 lb (81.2 kg).  Medication Reconciliation: complete   Laly/STACI     "

## 2017-10-16 NOTE — LETTER
10/16/2017         RE: Julio Oh  [address]  [place] [address]        Dear Colleague,    Thank you for referring your patient, Julio Oh, to the [facility]. Please see a copy of my visit note below.    Office Visit-Follow up    Chief Complaint: Julio Oh is a 26 year old male who is being seen for   Chief Complaint   Patient presents with     RECHECK     mri results of right knee       History of Present Illness:   Today's visit:  Returns for MRI results. Unchanged issues.  September 25, 2017 visit:  Julio Oh is a 26 year old male who is seen in consultation at the request of self for evaluation of knee pain.  History of Present Illness:   Mechanism of Injury: A twisting/pivioting event started the pain.  Patient participating in [employer] he described his Knee/leg being twisted in described as a figure 4 type position, a 250 pound participant was on top of him, and as his knee was pushed down he felt a pop in his lateral knee/distal right leg and had immediate pain.  Has been walking on it.  This was 2 months ago and not getting better.   Location: right knee, lower leg lateral, down leg to ankle/foot  Duration of Pain:  2 months  Rating of Pain:  moderate.    Pain Quality: dull and shooting at times  Pain is better with: Rest, Ice, Aleve and brace  Pain is worse with:  weightbearing, lateral movements, working out  Treatment so far consists of:  Ice, Aleve, brace.   Associated Features: Swelling initially, instability and knee giving out x 2, feels especially unstable with lateral movements. Denies pain from the back to the knee  Prior history of related problems:   No previous problem in the affected area.  The pain is limiting sports/physical activity.   Here for Orthopedic consultation.  The pain and instability is getting worse.      REVIEW OF SYSTEMS  General: negative for, night sweats, dizziness, fatigue  Resp: No shortness of breath and no cough  CV:  "negative for chest pain, syncope or near-syncope  GI: negative for nausea, vomiting and diarrhea  : negative for dysuria and hematuria  Musculoskeletal: as above  Neurologic: negative for syncope   Hematologic: negative for bleeding disorder    Physical Exam:  Vitals: Temp 98  F (36.7  C) (Temporal)  Ht 5' 11\" (1.803 m)  Wt 179 lb (81.2 kg)  BMI 24.97 kg/m2  BMI= Body mass index is 24.97 kg/(m^2).  Constitutional: healthy, alert and no acute distress   Psychiatric: mentation appears normal and affect normal/bright  NEURO: no focal deficits  RESP: Normal with easy respirations and no use of accessory muscles to breathe, no audible wheezing or retractions  CV: RLE: no edema         SKIN: No erythema, rashes, excoriation, or breakdown. No evidence of infection.   JOINT/EXTREMITIES:right knee: Full range of motion. Continues to have some lateral instability with varus stressing at 0 and 30 . Approximately +2 instability however it is equal to the contralateral side. dialtest is equal.  GAIT: not tested             Diagnostic Modalities:  right knee MRI:  No fractures or dislocations. Complete tear involving the mid substance fiber of the fibular collateral ligament. There is some thickening of the distal conjoined tendon of the biceps from Aurelio tendon. Anterior cruciate ligament and PCL are intact. MCL intact. Menisci are intact. Articular cartilage is intact with no full-thickness chondromalacia.  Independent visualization of the images was performed.      Impression: right knee lateral collateral ligament tear    Plan:  All of the above pertinent physical exam and imaging modalities findings was reviewed with Julio.    Treatment options discussed. We discussed referral versus nonoperative care. Once reviewed his operative proceed with physical therapy and a hinge knee brace. If he continues to have issues I will refer him to my partner Dr. Oneill. He should avoid aggressive activities at this " point.        Return to clinic 4-6 , week(s), PRN, or sooner as needed for changes. We'll have him see Dr. Oneill  Re-x-ray on return: No    Bert Nagy D.O.          Again, thank you for allowing me to participate in the care of your patient.        Sincerely,        Nadir Nagy, DO

## 2017-10-25 ENCOUNTER — THERAPY VISIT (OUTPATIENT)
Dept: PHYSICAL THERAPY | Facility: CLINIC | Age: 27
End: 2017-10-25
Payer: COMMERCIAL

## 2017-10-25 DIAGNOSIS — M25.561 RIGHT KNEE PAIN: Primary | ICD-10-CM

## 2017-10-25 PROCEDURE — 97110 THERAPEUTIC EXERCISES: CPT | Mod: GP | Performed by: PHYSICAL THERAPIST

## 2017-10-25 PROCEDURE — 97161 PT EVAL LOW COMPLEX 20 MIN: CPT | Mod: GP | Performed by: PHYSICAL THERAPIST

## 2017-10-25 NOTE — PROGRESS NOTES
"Subjective:    Patient is a 26 year old male presenting with rehab right knee hpi. The history is provided by the patient. No  was used.   Julio Oh is a 26 year old male with a right knee condition.  Condition occurred with:  A twist.  Condition occurred: during recreation/sport.  This is a new condition  Patient strained the right knee while participating in [employer] ~7/1/2017.  Knee was loaded with a varus force while in a flexed position.  Patient was able to continue with participation in sport, but knee never completely improve.  Patient reports he is able to participate at about \"70%\" level.   Bending, kneeling, squatting and lifting are limited.  MRI show LCL tear.  Physical therapy was ordered on 10/16/2017 .    Patient reports pain:  Lateral.  Radiates to:  Lower leg.  Pain is described as sharp and aching and is constant and reported as 6/10 (2-7/10).  Associated symptoms:  Loss of motion/stiffness, loss of strength and other (patient reports a \"couple\" knee instability episodes prior to onset). Pain is the same all the time.  Symptoms are exacerbated by running, bending/squatting, transfers and kneeling and relieved by rest, heat, ice and NSAID's.  Since onset symptoms are gradually improving.  Special tests:  MRI.  Previous treatment: none.    General health as reported by patient is excellent.  Pertinent medical history includes:  Depression.  Medical allergies: no.  Other surgeries include:  None reported.  Current medications:  Anti-depressants and sleep medication.  Current occupation is .  Patient is working in normal job without restrictions.      Barriers include:  None as reported by the patient.    Red flags:  None as reported by the patient.                        Objective:    Standing Alignment:              Knee deviations alignment: Decreased Quad muscle size compared to left.  Ankle/foot deviations: Decrease right calf muscle size compared to right " d/t old leg fracture.    Gait:    Gait Type:  Normal                                                           Knee Evaluation:  ROM:  AROM: normal  PROM: normal            Strength:     Extension:  Right: 5-/5    Pain:-  Flexion:  Right: 5-/5    Pain:-        Ligament Testing:    Varus 0:  Right:  Trace  Varus 30:  Right:  Gr II  Valgus 0:  Right:  Neg  Valgus 30:  Right:  Neg  Anterior Drawer:  Right:  Neg  Posterior Drawer: Right:  Neg      Palpation:  Normal      Edema:  Normal    Mobility Testing:    Proximal Tib-Fib:  Left: hypermobile    Right: hypermobile          Functional Testing:            Proprioception:   Stork Balance Test: Left:   Right:  30+ seconds eyes closed  % of Uninvolved:           General     ROS    Assessment/Plan:      Patient is a 26 year old male with right side knee complaints.    Patient has the following significant findings with corresponding treatment plan.                Diagnosis 1:  Lateral collateral ligament tear   Decreased strength - therapeutic exercise, therapeutic activities and home program  Impaired muscle performance - neuro re-education and home program  Decreased function - therapeutic activities and home program  Instability -  Therapeutic Activity  Therapeutic Exercise  Neuromuscular Re-education  home program    Therapy Evaluation Codes:   1) History comprised of:   Personal factors that impact the plan of care:      None.    Comorbidity factors that impact the plan of care are:      Depression.     Medications impacting care: Anti-depressant, Anti-inflammatory and Sleep.  2) Examination of Body Systems comprised of:   Body structures and functions that impact the plan of care:      Knee.   Activity limitations that impact the plan of care are:      Bending, Running, Sports and Squatting/kneeling.  3) Clinical presentation characteristics are:   Stable/Uncomplicated.  4) Decision-Making    Low complexity using standardized patient assessment instrument and/or  measureable assessment of functional outcome.  Cumulative Therapy Evaluation is: Low complexity.    Previous and current functional limitations:  (See Goal Flow Sheet for this information)    Short term and Long term goals: (See Goal Flow Sheet for this information)     Communication ability:  Patient appears to be able to clearly communicate and understand verbal and written communication and follow directions correctly.  Treatment Explanation - The following has been discussed with the patient:   RX ordered/plan of care  Anticipated outcomes  Possible risks and side effects  This patient would benefit from PT intervention to resume normal activities.   Rehab potential is good.    Frequency:  1 X week, once daily  Duration:  for 8 weeks  Discharge Plan:  Achieve all LTG.  Independent in home treatment program.  Reach maximal therapeutic benefit.    Please refer to the daily flowsheet for treatment today, total treatment time and time spent performing 1:1 timed codes.

## 2017-10-26 ASSESSMENT — ACTIVITIES OF DAILY LIVING (ADL)
STIFFNESS: I HAVE THE SYMPTOM BUT IT DOES NOT AFFECT MY ACTIVITY
WEAKNESS: THE SYMPTOM AFFECTS MY ACTIVITY MODERATELY
SQUAT: ACTIVITY IS SOMEWHAT DIFFICULT
RISE FROM A CHAIR: ACTIVITY IS FAIRLY DIFFICULT
HOW_WOULD_YOU_RATE_THE_OVERALL_FUNCTION_OF_YOUR_KNEE_DURING_YOUR_USUAL_DAILY_ACTIVITIES?: ABNORMAL
KNEE_ACTIVITY_OF_DAILY_LIVING_SCORE: 55.71
WALK: ACTIVITY IS SOMEWHAT DIFFICULT
RAW_SCORE: 39
GIVING WAY, BUCKLING OR SHIFTING OF KNEE: THE SYMPTOM AFFECTS MY ACTIVITY MODERATELY
GO DOWN STAIRS: ACTIVITY IS SOMEWHAT DIFFICULT
SWELLING: I HAVE THE SYMPTOM BUT IT DOES NOT AFFECT MY ACTIVITY
KNEEL ON THE FRONT OF YOUR KNEE: ACTIVITY IS FAIRLY DIFFICULT
STAND: ACTIVITY IS FAIRLY DIFFICULT
LIMPING: THE SYMPTOM AFFECTS MY ACTIVITY MODERATELY
KNEE_ACTIVITY_OF_DAILY_LIVING_SUM: 39
SIT WITH YOUR KNEE BENT: ACTIVITY IS MINIMALLY DIFFICULT
HOW_WOULD_YOU_RATE_THE_CURRENT_FUNCTION_OF_YOUR_KNEE_DURING_YOUR_USUAL_DAILY_ACTIVITIES_ON_A_SCALE_FROM_0_TO_100_WITH_100_BEING_YOUR_LEVEL_OF_KNEE_FUNCTION_PRIOR_TO_YOUR_INJURY_AND_0_BEING_THE_INABILITY_TO_PERFORM_ANY_OF_YOUR_USUAL_DAILY_ACTIVITIES?: 70
PAIN: THE SYMPTOM AFFECTS MY ACTIVITY SLIGHTLY
GO UP STAIRS: ACTIVITY IS SOMEWHAT DIFFICULT
AS_A_RESULT_OF_YOUR_KNEE_INJURY,_HOW_WOULD_YOU_RATE_YOUR_CURRENT_LEVEL_OF_DAILY_ACTIVITY?: NEARLY NORMAL

## 2017-10-30 ENCOUNTER — THERAPY VISIT (OUTPATIENT)
Dept: PHYSICAL THERAPY | Facility: CLINIC | Age: 27
End: 2017-10-30
Payer: COMMERCIAL

## 2017-10-30 DIAGNOSIS — M25.561 RIGHT KNEE PAIN: ICD-10-CM

## 2017-10-30 PROCEDURE — 97110 THERAPEUTIC EXERCISES: CPT | Mod: GP | Performed by: PHYSICAL THERAPIST

## 2017-10-30 PROCEDURE — 97112 NEUROMUSCULAR REEDUCATION: CPT | Mod: GP | Performed by: PHYSICAL THERAPIST

## 2017-11-06 ENCOUNTER — THERAPY VISIT (OUTPATIENT)
Dept: PHYSICAL THERAPY | Facility: CLINIC | Age: 27
End: 2017-11-06
Payer: COMMERCIAL

## 2017-11-06 DIAGNOSIS — M25.561 RIGHT KNEE PAIN: ICD-10-CM

## 2017-11-06 PROCEDURE — 97112 NEUROMUSCULAR REEDUCATION: CPT | Mod: GP | Performed by: PHYSICAL THERAPIST

## 2017-11-06 PROCEDURE — 97110 THERAPEUTIC EXERCISES: CPT | Mod: GP | Performed by: PHYSICAL THERAPIST

## 2017-11-07 DIAGNOSIS — F51.01 PRIMARY INSOMNIA: ICD-10-CM

## 2017-11-07 DIAGNOSIS — F43.22 ADJUSTMENT DISORDER WITH ANXIETY: ICD-10-CM

## 2017-11-07 RX ORDER — LORAZEPAM 1 MG/1
0.5-1 TABLET ORAL 3 TIMES DAILY PRN
Qty: 30 TABLET | Refills: 0 | Status: SHIPPED | OUTPATIENT
Start: 2017-11-07 | End: 2017-11-27

## 2017-11-07 RX ORDER — TRAZODONE HYDROCHLORIDE 50 MG/1
TABLET, FILM COATED ORAL
Qty: 90 TABLET | Refills: 1 | Status: SHIPPED | OUTPATIENT
Start: 2017-11-07 | End: 2018-04-30

## 2017-11-07 NOTE — TELEPHONE ENCOUNTER
Hello,  last fill date:10-  Last quantity:30 days    Thank You,  Barbara Cazares  Pharmacy Technician  [facility]  [phone]

## 2017-11-15 ENCOUNTER — THERAPY VISIT (OUTPATIENT)
Dept: PHYSICAL THERAPY | Facility: CLINIC | Age: 27
End: 2017-11-15
Payer: COMMERCIAL

## 2017-11-15 DIAGNOSIS — M25.561 RIGHT KNEE PAIN: ICD-10-CM

## 2017-11-15 PROCEDURE — 97110 THERAPEUTIC EXERCISES: CPT | Mod: GP | Performed by: PHYSICAL THERAPIST

## 2017-11-15 PROCEDURE — 97112 NEUROMUSCULAR REEDUCATION: CPT | Mod: GP | Performed by: PHYSICAL THERAPIST

## 2017-11-27 DIAGNOSIS — F43.22 ADJUSTMENT DISORDER WITH ANXIETY: ICD-10-CM

## 2017-11-27 RX ORDER — LORAZEPAM 1 MG/1
0.5-1 TABLET ORAL 3 TIMES DAILY PRN
Qty: 30 TABLET | Refills: 0 | Status: SHIPPED | OUTPATIENT
Start: 2017-11-27 | End: 2018-02-19

## 2017-11-27 NOTE — TELEPHONE ENCOUNTER
Hello,  last fill date:11-  Last quantity:30    Thank You,  Barbara Cazares  Pharmacy Technician  [facility]  [phone]

## 2017-11-29 ENCOUNTER — THERAPY VISIT (OUTPATIENT)
Dept: PHYSICAL THERAPY | Facility: CLINIC | Age: 27
End: 2017-11-29
Payer: COMMERCIAL

## 2017-11-29 DIAGNOSIS — M25.561 RIGHT KNEE PAIN: ICD-10-CM

## 2017-11-29 PROCEDURE — 97110 THERAPEUTIC EXERCISES: CPT | Mod: GP | Performed by: PHYSICAL THERAPIST

## 2017-11-29 PROCEDURE — 97112 NEUROMUSCULAR REEDUCATION: CPT | Mod: GP | Performed by: PHYSICAL THERAPIST

## 2017-12-26 ENCOUNTER — MYC MEDICAL ADVICE (OUTPATIENT)
Dept: FAMILY MEDICINE | Facility: CLINIC | Age: 27
End: 2017-12-26

## 2018-02-19 ENCOUNTER — OFFICE VISIT (OUTPATIENT)
Dept: FAMILY MEDICINE | Facility: CLINIC | Age: 28
End: 2018-02-19
Payer: COMMERCIAL

## 2018-02-19 VITALS
WEIGHT: 190 LBS | HEART RATE: 60 BPM | SYSTOLIC BLOOD PRESSURE: 118 MMHG | DIASTOLIC BLOOD PRESSURE: 72 MMHG | OXYGEN SATURATION: 98 % | RESPIRATION RATE: 16 BRPM | BODY MASS INDEX: 26.6 KG/M2 | HEIGHT: 71 IN

## 2018-02-19 DIAGNOSIS — R53.83 FATIGUE, UNSPECIFIED TYPE: Primary | ICD-10-CM

## 2018-02-19 DIAGNOSIS — F43.22 ADJUSTMENT DISORDER WITH ANXIETY: ICD-10-CM

## 2018-02-19 LAB
ERYTHROCYTE [DISTWIDTH] IN BLOOD BY AUTOMATED COUNT: 13.4 % (ref 10–15)
HCT VFR BLD AUTO: 43 % (ref 40–53)
HGB BLD-MCNC: 14.3 G/DL (ref 13.3–17.7)
MCH RBC QN AUTO: 30 PG (ref 26.5–33)
MCHC RBC AUTO-ENTMCNC: 33.3 G/DL (ref 31.5–36.5)
MCV RBC AUTO: 90 FL (ref 78–100)
PLATELET # BLD AUTO: 224 10E9/L (ref 150–450)
RBC # BLD AUTO: 4.77 10E12/L (ref 4.4–5.9)
TSH SERPL DL<=0.005 MIU/L-ACNC: 1.57 MU/L (ref 0.4–4)
WBC # BLD AUTO: 5 10E9/L (ref 4–11)

## 2018-02-19 PROCEDURE — 36415 COLL VENOUS BLD VENIPUNCTURE: CPT | Performed by: FAMILY MEDICINE

## 2018-02-19 PROCEDURE — 84270 ASSAY OF SEX HORMONE GLOBUL: CPT | Performed by: FAMILY MEDICINE

## 2018-02-19 PROCEDURE — 84443 ASSAY THYROID STIM HORMONE: CPT | Performed by: FAMILY MEDICINE

## 2018-02-19 PROCEDURE — 84403 ASSAY OF TOTAL TESTOSTERONE: CPT | Performed by: FAMILY MEDICINE

## 2018-02-19 PROCEDURE — 99214 OFFICE O/P EST MOD 30 MIN: CPT | Performed by: FAMILY MEDICINE

## 2018-02-19 PROCEDURE — 85027 COMPLETE CBC AUTOMATED: CPT | Performed by: FAMILY MEDICINE

## 2018-02-19 RX ORDER — PAROXETINE 20 MG/1
20 TABLET, FILM COATED ORAL DAILY
Qty: 90 TABLET | Refills: 1 | Status: SHIPPED | OUTPATIENT
Start: 2018-02-19 | End: 2018-09-19

## 2018-02-19 RX ORDER — LORAZEPAM 1 MG/1
0.5-1 TABLET ORAL 3 TIMES DAILY PRN
Qty: 30 TABLET | Refills: 0 | Status: SHIPPED | OUTPATIENT
Start: 2018-02-19 | End: 2018-04-09

## 2018-02-19 RX ORDER — PAROXETINE 30 MG/1
30 TABLET, FILM COATED ORAL DAILY
Qty: 90 TABLET | Refills: 1 | Status: SHIPPED | OUTPATIENT
Start: 2018-02-19 | End: 2018-09-17

## 2018-02-19 NOTE — NURSING NOTE
"Chief Complaint   Patient presents with     Fatigue       Initial Ht 1.803 m (5' 11\")  Wt 86.2 kg (190 lb)  BMI 26.5 kg/m2 Estimated body mass index is 26.5 kg/(m^2) as calculated from the following:    Height as of this encounter: 1.803 m (5' 11\").    Weight as of this encounter: 86.2 kg (190 lb).  Medication Reconciliation: complete     Starla Duarte, Medical Assistant      "

## 2018-02-19 NOTE — PROGRESS NOTES
"  SUBJECTIVE:   Julio Oh is a 27 year old male who presents to clinic today for the following health issues:      Concern - Fatigue   Onset: 2 weeks ago     Description:   Fatigue, muscle ache.     Intensity: moderate    Progression of Symptoms:  worsening    Accompanying Signs & Symptoms:  Nothing     Previous history of similar problem:   No    Precipitating factors:   Worsened by: working all day     Alleviating factors:  Improved by: taking naps     Therapies Tried and outcome: Trazodone     SUBJECTIVE:  Here today with the above symptoms and to follow-up on anxiety symptoms.  Is on a combination of Wellbutrin and Paxil for anxiety and mild depression he generally thinks it is going well but wondering if we could increase the Paxil.  Things have been a bit stressful recently.  Rare use of Lorazepam as needed and this has done very well.  Thinks he sleeps well at night but sometimes will wake up not feeling refreshed.  Does not toss and turn her wake up a lot denies any significant snoring to his knowledge.  But he just feels tired a lot.  No skin rashes or change in bowel habits.  He was having some pain in his left shoulder region for a bit but that seems to have disappeared.  Not associated with headache or neurologic symptoms.  No changes in weight or physical strength.  A little bit of decreased libido    Review of systems otherwise negative.  Past medical, family, and social history reviewed and updated in chart.    OBJECTIVE:  /72 (BP Location: Right arm, Patient Position: Sitting, Cuff Size: Adult Large)  Pulse 60  Resp 16  Ht 1.803 m (5' 11\")  Wt 86.2 kg (190 lb)  SpO2 98%  BMI 26.5 kg/m2  Alert, pleasant, upbeat, and in no apparent discomfort.  S1 and S2 normal, no murmurs, clicks, gallops or rubs. Regular rate and rhythm. Chest is clear; no wheezes or rales. No edema or JVD.  The abdomen is soft without tenderness, guarding, mass, rebound or organomegaly. Bowel sounds are normal. No " CVA tenderness or inguinal adenopathy noted.  I note only benign skin findings. No unusual rashes or suspicious skin lesions noted. Nails appear normal.   Past labs reviewed with the patient.     ASSESSMENT / PLAN:  (R53.83) Fatigue, unspecified type  (primary encounter diagnosis)  Comment:   Plan: TSH with free T4 reflex, CBC with platelets,         Testosterone Free and Total            (F43.22) Adjustment disorder with anxiety  Comment: We will increase to 50 mg daily of the Paxil maintain the other medications  Plan: PARoxetine (PAXIL) 20 MG tablet, PARoxetine         (PAXIL) 30 MG tablet, LORazepam (ATIVAN) 1 MG         tablet            Follow up based upon results  GABY Boss MD    (Chart documentation completed in part with Dragon voice-recognition software.  Even though reviewed some grammatical, spelling, and word errors may remain.)

## 2018-02-21 LAB
SHBG SERPL-SCNC: 48 NMOL/L (ref 11–80)
TESTOST FREE SERPL-MCNC: 10.6 NG/DL (ref 4.7–24.4)
TESTOST SERPL-MCNC: 621 NG/DL (ref 240–950)

## 2018-02-22 NOTE — PROGRESS NOTES
[place],  Your lab work all looks normal - nothing to explain your symptoms, but nothing bad either.  Let's keep an eye on things and keep me updated.  GABY Boss M.D.

## 2018-03-22 ENCOUNTER — RADIANT APPOINTMENT (OUTPATIENT)
Dept: GENERAL RADIOLOGY | Facility: CLINIC | Age: 28
End: 2018-03-22
Attending: NURSE PRACTITIONER
Payer: COMMERCIAL

## 2018-03-22 ENCOUNTER — OFFICE VISIT (OUTPATIENT)
Dept: ORTHOPEDICS | Facility: CLINIC | Age: 28
End: 2018-03-22
Payer: COMMERCIAL

## 2018-03-22 VITALS — BODY MASS INDEX: 26.11 KG/M2 | WEIGHT: 186.5 LBS | HEIGHT: 71 IN | TEMPERATURE: 97.7 F

## 2018-03-22 DIAGNOSIS — M25.562 ACUTE PAIN OF LEFT KNEE: Primary | ICD-10-CM

## 2018-03-22 DIAGNOSIS — S89.92XA KNEE INJURY, LEFT, INITIAL ENCOUNTER: ICD-10-CM

## 2018-03-22 PROCEDURE — 73562 X-RAY EXAM OF KNEE 3: CPT | Mod: TC

## 2018-03-22 PROCEDURE — 99213 OFFICE O/P EST LOW 20 MIN: CPT | Performed by: ORTHOPAEDIC SURGERY

## 2018-03-22 ASSESSMENT — PAIN SCALES - GENERAL: PAINLEVEL: EXTREME PAIN (8)

## 2018-03-22 NOTE — LETTER
"    3/22/2018         RE: Jluio Oh  [address]  [place] [address]        Dear Colleague,    Thank you for referring your patient, Julio Oh, to the [facility]. Please see a copy of my visit note below.    Office Visit-Follow up    Chief Complaint: Julio Oh is a 27 year old male who is being seen for   Chief Complaint   Patient presents with     Knee Pain     left knee pain       History of Present Illness:   Julio Oh is a 27 year old male who is seen in consultation at the request of self for evaluation of left knee pain.  Mechanism of Injury: at [employer] class 1 day ago (3/21/18) was sparring, had knee in a described STEPHEN position, went to straight out the left leg and felt an immediate pain large pop.  Went down, was able to get back up and bear weight.  Pain is worse today.  Had immediate swelling.  No previous injury to the left knee.  Placed the knee in a hinged brace, has been bearing weight and into orthopedic clinic.  Taking ibuprofen.   Pain is a sharp, throbbing pain, distal lateral knee described around fibular head.  Worse with bearing weight, walking, twisting, states squatting not bad.       States felt similar to recent right knee LCL injury and symptoms.     REVIEW OF SYSTEMS  General: negative for, night sweats, dizziness, fatigue  Resp: No shortness of breath and no cough  CV: negative for chest pain, syncope or near-syncope  GI: negative for nausea, vomiting and diarrhea  : negative for dysuria and hematuria  Musculoskeletal: as above  Neurologic: negative for syncope   Hematologic: negative for bleeding disorder    Physical Exam:  Vitals: Temp 97.7  F (36.5  C) (Temporal)  Ht 1.803 m (5' 11\")  Wt 84.6 kg (186 lb 8 oz)  BMI 26.01 kg/m2  BMI= Body mass index is 26.01 kg/(m^2).  Constitutional: healthy, alert and no acute distress   Psychiatric: mentation appears normal and affect normal/bright  NEURO: no focal deficits  RESP: Normal with " easy respirations and no use of accessory muscles to breathe, no audible wheezing or retractions  CV: LLE:  no edema         Regular rate and rhythm by palpation  SKIN: No erythema, rashes, excoriation, or breakdown. No evidence of infection.   JOINT/EXTREMITIES:left Knee Exam: Inspection: small effusion  Tender: fibular head, lateral knee  Non-tender: medial knee, joint line  Active Range of Motion: full flexion, full extension, mild pain with full extension  Strength: not tested  Special tests: negative Lachman's test, negative posterior drawer, negative Katelin's , no apprehension with lateral stress of the patella  2+ lateral instability varus stressing at 0 and 30 degrees. Similar to contra-lateral side.  No other instability.   Sensation grossly intact to left knee.           Diagnostic Modalities:  left knee X-ray: No fracture, dislocation and or lesion. Normal alignment.  Joint space maintained no significant arthritis. No appreciable soft tissue abnormality  Independent visualization of the images was performed.      Impression: left knee acute injury - 1 day from injury    Plan:  All of the above pertinent physical exam and imaging modalities findings was reviewed with Julio.  Description and exam consistent with LCL injury.  However discussed with patient about conservative care and watchful waiting prior to MRI.  Patient is agreeable to this.  Recommended non-weight bearing with crutches and hinged brace along with resting, icing, compression, and elevation and NSAIDS.  Provided patient with brace and crutches today.  If not getting better within 2 weeks recommended he contact clinic, will obtain MRI then see him back.       Return to clinic 2, week(s) to discuss test results if needed, or sooner as needed for changes.  Re-x-ray on return: No    Scribed by:  Patrick Schmitt, APRN, CNP, DNP  11:58 AM  3/22/2018    I attest I have seen and evaluated the patient.  I agree with above impression and  plan.  Bert Nagy D.O.          Again, thank you for allowing me to participate in the care of your patient.        Sincerely,        Nadir Nagy, DO

## 2018-03-22 NOTE — MR AVS SNAPSHOT
"              After Visit Summary   3/22/2018    Julio Oh    MRN: [id number]           Patient Information     Date Of Birth          1990        Visit Information        Provider Department      3/22/2018 10:50 AM Nadir Nagy,  [facility]        Today's Diagnoses     Acute pain of left knee    -  1    Knee injury, left, initial encounter           Follow-ups after your visit        Who to contact     If you have questions or need follow up information about today's clinic visit or your schedule please contact [facility] directly at [phone].  Normal or non-critical lab and imaging results will be communicated to you by InferXhart, letter or phone within 4 business days after the clinic has received the results. If you do not hear from us within 7 days, please contact the clinic through [employer] or phone. If you have a critical or abnormal lab result, we will notify you by phone as soon as possible.  Submit refill requests through [employer] or call your pharmacy and they will forward the refill request to us. Please allow 3 business days for your refill to be completed.          Additional Information About Your Visit        MyChart Information     [employer] gives you secure access to your electronic health record. If you see a primary care provider, you can also send messages to your care team and make appointments. If you have questions, please call your primary care clinic.  If you do not have a primary care provider, please call [phone] and they will assist you.        Care EveryWhere ID     This is your Care EveryWhere ID. This could be used by other organizations to access your [place] medical records  [id number]        Your Vitals Were     Temperature Height BMI (Body Mass Index)             97.7  F (36.5  C) (Temporal) 5' 11\" (1.803 m) 26.01 kg/m2          Blood Pressure from Last 3 Encounters:   02/19/18 118/72   07/05/17 118/68   03/24/17 " 133/83    Weight from Last 3 Encounters:   03/22/18 186 lb 8 oz (84.6 kg)   02/19/18 190 lb (86.2 kg)   10/16/17 179 lb (81.2 kg)              We Performed the Following     XR Knee Left 3 Views        Primary Care Provider Office Phone # Fax #    Jenifer Lavon Boss [phone] [address] [facility] [address]        Equal Access to Services     BERNICE SAPP : Hadii aad ku hadasho Soomaali, waaxda luqadaha, qaybta kaalmada adeegyada, waxay idiin hayaan adeeg kharash la'nataliya . So [facility] [phone].    ATENCIÓN: Si habla español, tiene a mac disposición servicios gratuitos de asistencia lingüística. [facility] [phone].    We comply with applicable federal civil rights laws and [place] laws. We do not discriminate on the basis of race, color, national origin, age, disability, sex, sexual orientation, or gender identity.            Thank you!     Thank you for choosing [facility]  for your care. Our goal is always to provide you with excellent care. Hearing back from our patients is one way we can continue to improve our services. Please take a few minutes to complete the written survey that you may receive in the mail after your visit with us. Thank you!             Your Updated Medication List - Protect others around you: Learn how to safely use, store and throw away your medicines at www.disposemymeds.org.          This list is accurate as of 3/22/18 12:01 PM.  Always use your most recent med list.                   Brand Name Dispense Instructions for use Diagnosis    buPROPion 75 MG tablet    WELLBUTRIN    180 tablet    Take 1 tablet (75 mg) by mouth 2 times daily    Adjustment disorder with anxiety       ibuprofen 800 MG tablet    ADVIL/MOTRIN    30 tablet    Take 1 tablet (800 mg) by mouth every 8 hours as needed for moderate pain    Acute sinusitis with symptoms > 10 days       LORazepam 1 MG tablet    ATIVAN    30 tablet    Take 0.5-1 tablets (0.5-1 mg)  by mouth 3 times daily as needed for anxiety    Adjustment disorder with anxiety       MULTIVITAMIN & MINERAL PO      Take  by mouth.        * PARoxetine 20 MG tablet    PAXIL    90 tablet    Take 1 tablet (20 mg) by mouth daily With 30 mg to make 50 mg daily    Adjustment disorder with anxiety       * PARoxetine 30 MG tablet    PAXIL    90 tablet    Take 1 tablet (30 mg) by mouth daily With 20 mg to make 50 mg daily    Adjustment disorder with anxiety       traZODone 50 MG tablet    DESYREL    90 tablet    TAKE 1 TABLET (50MG) BY MOUTH NIGHTLY FOR SLEEP    Primary insomnia       * Notice:  This list has 2 medication(s) that are the same as other medications prescribed for you. Read the directions carefully, and ask your doctor or other care provider to review them with you.

## 2018-03-22 NOTE — NURSING NOTE
"Chief Complaint   Patient presents with     Knee Pain     left knee pain       Initial Temp 97.7  F (36.5  C) (Temporal)  Ht 1.803 m (5' 11\")  Wt 84.6 kg (186 lb 8 oz)  BMI 26.01 kg/m2 Estimated body mass index is 26.01 kg/(m^2) as calculated from the following:    Height as of this encounter: 1.803 m (5' 11\").    Weight as of this encounter: 84.6 kg (186 lb 8 oz).  Medication Reconciliation: complete   Laly/STACI     "

## 2018-03-22 NOTE — PROGRESS NOTES
"Office Visit-Follow up    Chief Complaint: Julio Oh is a 27 year old male who is being seen for   Chief Complaint   Patient presents with     Knee Pain     left knee pain       History of Present Illness:   Julio Oh is a 27 year old male who is seen in consultation at the request of self for evaluation of left knee pain.  Mechanism of Injury: at [employer] 1 day ago (3/21/18) was sparring, had knee in a described STEPHEN position, went to straight out the left leg and felt an immediate pain large pop.  Went down, was able to get back up and bear weight.  Pain is worse today.  Had immediate swelling.  No previous injury to the left knee.  Placed the knee in a hinged brace, has been bearing weight and into orthopedic clinic.  Taking ibuprofen.   Pain is a sharp, throbbing pain, distal lateral knee described around fibular head.  Worse with bearing weight, walking, twisting, states squatting not bad.       States felt similar to recent right knee LCL injury and symptoms.     REVIEW OF SYSTEMS  General: negative for, night sweats, dizziness, fatigue  Resp: No shortness of breath and no cough  CV: negative for chest pain, syncope or near-syncope  GI: negative for nausea, vomiting and diarrhea  : negative for dysuria and hematuria  Musculoskeletal: as above  Neurologic: negative for syncope   Hematologic: negative for bleeding disorder    Physical Exam:  Vitals: Temp 97.7  F (36.5  C) (Temporal)  Ht 1.803 m (5' 11\")  Wt 84.6 kg (186 lb 8 oz)  BMI 26.01 kg/m2  BMI= Body mass index is 26.01 kg/(m^2).  Constitutional: healthy, alert and no acute distress   Psychiatric: mentation appears normal and affect normal/bright  NEURO: no focal deficits  RESP: Normal with easy respirations and no use of accessory muscles to breathe, no audible wheezing or retractions  CV: LLE:  no edema         Regular rate and rhythm by palpation  SKIN: No erythema, rashes, excoriation, or breakdown. No evidence of infection. "   JOINT/EXTREMITIES:left Knee Exam: Inspection: small effusion  Tender: fibular head, lateral knee  Non-tender: medial knee, joint line  Active Range of Motion: full flexion, full extension, mild pain with full extension  Strength: not tested  Special tests: negative Lachman's test, negative posterior drawer, negative Katelin's , no apprehension with lateral stress of the patella  2+ lateral instability varus stressing at 0 and 30 degrees. Similar to contra-lateral side.  No other instability.   Sensation grossly intact to left knee.           Diagnostic Modalities:  left knee X-ray: No fracture, dislocation and or lesion. Normal alignment.  Joint space maintained no significant arthritis. No appreciable soft tissue abnormality  Independent visualization of the images was performed.      Impression: left knee acute injury - 1 day from injury    Plan:  All of the above pertinent physical exam and imaging modalities findings was reviewed with Julio.  Description and exam consistent with LCL injury.  However discussed with patient about conservative care and watchful waiting prior to MRI.  Patient is agreeable to this.  Recommended non-weight bearing with crutches and hinged brace along with resting, icing, compression, and elevation and NSAIDS.  Provided patient with brace and crutches today.  If not getting better within 2 weeks recommended he contact clinic, will obtain MRI then see him back.       Return to clinic 2, week(s) to discuss test results if needed, or sooner as needed for changes.  Re-x-ray on return: No    Scribed by:  PATRICK Stevens, CNP, DNP  11:58 AM  3/22/2018    I attest I have seen and evaluated the patient.  I agree with above impression and plan.  Bert Nagy D.O.

## 2018-03-26 ENCOUNTER — MYC MEDICAL ADVICE (OUTPATIENT)
Dept: ORTHOPEDICS | Facility: CLINIC | Age: 28
End: 2018-03-26

## 2018-03-26 DIAGNOSIS — M25.562 ACUTE PAIN OF LEFT KNEE: Primary | ICD-10-CM

## 2018-03-26 RX ORDER — MELOXICAM 7.5 MG/1
7.5 TABLET ORAL DAILY
Qty: 14 TABLET | Refills: 0 | Status: SHIPPED | OUTPATIENT
Start: 2018-03-26 | End: 2018-07-27

## 2018-03-26 RX ORDER — METHOCARBAMOL 500 MG/1
500 TABLET, FILM COATED ORAL 2 TIMES DAILY PRN
Qty: 30 TABLET | Refills: 0 | Status: SHIPPED | OUTPATIENT
Start: 2018-03-26 | End: 2018-07-27

## 2018-03-26 NOTE — TELEPHONE ENCOUNTER
"Patient requested Mobic and Robaxin.  Rx sent.  The following message was sent to [place] via [employer] [facility]:      \"[place], the medications were sent to pharmacy.  With the muscle relaxer, they can make you sleepy/tired, do not drive operate machinery ect after using. Do not mix with alcohol.    I have also prescribed an antiinflammatory medication (Mobic).  This is the same class of some the common over the counter medications (Ibuprofen, Advil, Motrin, Aleve, Naproxen, and  Naprosyn). I recommend to avoid taking these OTC's medication when taking the medication that I prescribed. This medication should be stopped if having stomach issues, bleeding, high blood pressure and/or chest pain. \"    PATRICK Garcia, CNP, DNP  Orthopedic Surgery    If not better in another week then recommend MRI and f/u    "

## 2018-03-26 NOTE — TELEPHONE ENCOUNTER
Would not recommend narcotic for this type of injury. If patient would like could prescribe muscle relaxer to take, and could prescribe a prescription strength NSAID like Voltaren or Mobic.  Would need to stop ibuprofen if wants prescription strength NSAID.This is the same class of some the common over the counter medications (Ibuprofen, Advil, Motrin, Aleve, Naproxen, and  Naprosyn). I recommend to avoid taking these OTC's medication when taking the medication that I prescribed. This medication should be stopped if having stomach issues, bleeding, high blood pressure and/or chest pain.     Replied directly to patient with ektahart.      Med Schmitt, APRN, CNP, DNP  Orthopedic Surgery

## 2018-03-29 ENCOUNTER — MYC MEDICAL ADVICE (OUTPATIENT)
Dept: ORTHOPEDICS | Facility: CLINIC | Age: 28
End: 2018-03-29

## 2018-03-29 DIAGNOSIS — S89.92XA KNEE INJURY, LEFT, INITIAL ENCOUNTER: Primary | ICD-10-CM

## 2018-03-29 NOTE — TELEPHONE ENCOUNTER
Order placed, patient notified via mychart along with instructions to scheduled Left knee MRI.   Med Schmitt APRN, CNP, DNP  Orthopedic Surgery

## 2018-03-29 NOTE — TELEPHONE ENCOUNTER
Would not recommend an MRI to  healing from a previous injury.  Healing would be judged by progress and symptoms and a new MRI would not be indicated.     Will order a left knee MRI, then have patient return to clinic to discuss results after the MRI.      PATRICK Garcia, CNP, DNP  Orthopedic Surgery

## 2018-04-01 ENCOUNTER — MYC MEDICAL ADVICE (OUTPATIENT)
Dept: ORTHOPEDICS | Facility: CLINIC | Age: 28
End: 2018-04-01

## 2018-04-09 ENCOUNTER — RADIANT APPOINTMENT (OUTPATIENT)
Dept: MRI IMAGING | Facility: CLINIC | Age: 28
End: 2018-04-09
Attending: NURSE PRACTITIONER
Payer: COMMERCIAL

## 2018-04-09 DIAGNOSIS — F43.22 ADJUSTMENT DISORDER WITH ANXIETY: ICD-10-CM

## 2018-04-09 DIAGNOSIS — S89.92XA KNEE INJURY, LEFT, INITIAL ENCOUNTER: ICD-10-CM

## 2018-04-09 LAB — RADIOLOGIST FLAGS: NORMAL

## 2018-04-09 PROCEDURE — 73721 MRI JNT OF LWR EXTRE W/O DYE: CPT | Mod: LT | Performed by: RADIOLOGY

## 2018-04-09 RX ORDER — LORAZEPAM 1 MG/1
0.5-1 TABLET ORAL 3 TIMES DAILY PRN
Qty: 30 TABLET | Refills: 0 | Status: CANCELLED | OUTPATIENT
Start: 2018-04-09

## 2018-04-09 RX ORDER — LORAZEPAM 1 MG/1
0.5-1 TABLET ORAL 3 TIMES DAILY PRN
Qty: 30 TABLET | Refills: 0 | Status: SHIPPED | OUTPATIENT
Start: 2018-04-09 | End: 2018-05-16

## 2018-04-09 NOTE — TELEPHONE ENCOUNTER
Routing refill request to provider for review/approval because:  Drug not on the [facility] refill protocol   Sarah Alanis RN

## 2018-04-09 NOTE — TELEPHONE ENCOUNTER
Hello,  last fill date:02-  Last quantity:30    Thank You,  Barbara Cazares  Pharmacy Technician  [facility]  [phone]

## 2018-04-09 NOTE — PROGRESS NOTES
Discharge Note    Progress reporting period is from initial eval to Nov 29, 2017.     Julio failed to return for next follow up visit and current status is unknown.  Please see information below for last relevant information on current status.  Patient seen for 5 visits.  SUBJECTIVE  Subjective changes noted by patient:  Knee is a little more sore today after a varus force while knee was flexed during practice.  No c/o instability.  Changes in function:  Yes (See Goal flowsheet attached for changes in current functional level)  Adverse reaction to treatment or activity: None    OBJECTIVE  Changes noted in objective findings: MMT: 5/5 Ext, 5/5 Flex     ASSESSMENT/PLAN  Diagnosis: R Knee LCL   DIAGP:  The encounter diagnosis was Right knee pain.  STG/LTGs have been met or progress has been made towards goals:  Yes, please see goal flowsheet for most current information  Assessment of Progress: current status is unknown.    Last current status: Pt is progressing well   Self Management Plans:  HEP  I have re-evaluated this patient and find that the nature, scope, duration and intensity of the therapy is appropriate for the medical condition of the patient.  uJlio continues to require the following intervention to meet STG and LTG's:  HEP.    Recommendations:  Discharge with current home program.  Patient to follow up with MD as needed.    Please refer to the daily flowsheet for treatment today, total treatment time and time spent performing 1:1 timed codes.

## 2018-04-09 NOTE — TELEPHONE ENCOUNTER
Requested Prescriptions   Pending Prescriptions Disp Refills     LORazepam (ATIVAN) 1 MG tablet 30 tablet 0     Sig: Take 0.5-1 tablets (0.5-1 mg) by mouth 3 times daily as needed for anxiety    There is no refill protocol information for this order        LORazepam (ATIVAN) 1 MG tablet      Last Written Prescription Date:  2/19/18  Last Fill Quantity: 30,   # refills: 0  Last Office Visit: 2/19/18  Future Office visit:    Next 5 appointments (look out 90 days)     Apr 12, 2018  3:50 PM CDT   [facility]ir [facility] DO   [facility]    [address] [address] [phone]                   Routing refill request to provider for review/approval because:  Drug not on the [facility], [facility] or [facility] refill protocol or controlled substance

## 2018-04-10 NOTE — TELEPHONE ENCOUNTER
FYI - Duplicate - this was already done.  Please check the chart to make sure a duplicate encounter is not entered.

## 2018-04-11 NOTE — TELEPHONE ENCOUNTER
Called and spoke with patient.  Gave MRI results.  Similar injury to contra-lateral knee.  Recommended continue activity modification, hinged brace, and physical therapy and to follow-up.  Patient states lives in [place], and will send [employer] message when he decides where he wants to go for PT.  Also recommended following up in clinic. Patient states will figure out a time convenient for him to follow-up  All questions answered.    Med Schmitt, APRN, CNP, DNP  Orthopedic Surgery

## 2018-04-30 DIAGNOSIS — F51.01 PRIMARY INSOMNIA: ICD-10-CM

## 2018-04-30 RX ORDER — TRAZODONE HYDROCHLORIDE 50 MG/1
TABLET, FILM COATED ORAL
Qty: 90 TABLET | Refills: 1 | Status: SHIPPED | OUTPATIENT
Start: 2018-04-30 | End: 2018-10-29

## 2018-04-30 NOTE — TELEPHONE ENCOUNTER
"Requested Prescriptions   Pending Prescriptions Disp Refills     traZODone (DESYREL) 50 MG tablet [Pharmacy Med Name: TRAZODONE HCL 50MG TABS] 90 tablet 1     Sig: TAKE 1 TABLET (50MG) BY MOUTH NIGHTLY FOR SLEEP    Serotonin Modulators Passed    4/30/2018  9:01 AM       Passed - Recent (12 mo) or future (30 days) visit within the authorizing provider's specialty    Patient had office visit in the last 12 months or has a visit in the next 30 days with authorizing provider or within the authorizing provider's specialty.  See \"Patient Info\" tab in inbasket, or \"Choose Columns\" in Meds & Orders section of the refill encounter.           Passed - Patient is age 18 or older        traZODone (DESYREL) 50 MG tablet  Last Written Prescription Date:  11/7/17  Last Fill Quantity: 90,  # refills: 1   Last office visit: 2/19/2018 with prescribing provider:  Dr. Boss   Future Office Visit:      "

## 2018-05-01 ENCOUNTER — MYC MEDICAL ADVICE (OUTPATIENT)
Dept: FAMILY MEDICINE | Facility: CLINIC | Age: 28
End: 2018-05-01

## 2018-05-16 DIAGNOSIS — F43.22 ADJUSTMENT DISORDER WITH ANXIETY: ICD-10-CM

## 2018-05-16 NOTE — TELEPHONE ENCOUNTER
Requested Prescriptions   Pending Prescriptions Disp Refills     LORazepam (ATIVAN) 1 MG tablet 30 tablet 0     Sig: Take 0.5-1 tablets (0.5-1 mg) by mouth 3 times daily as needed for anxiety    There is no refill protocol information for this order        LORazepam (ATIVAN) 1 MG tablet      Last Written Prescription Date:  4/9/18  Last Fill Quantity: 30,   # refills: 0  Last Office Visit: 2/19/18  Future Office visit:       Routing refill request to provider for review/approval because:  Drug not on the [facility], [facility] or [facility] refill protocol or controlled substance

## 2018-05-17 RX ORDER — LORAZEPAM 1 MG/1
0.5-1 TABLET ORAL 3 TIMES DAILY PRN
Qty: 30 TABLET | Refills: 0 | Status: SHIPPED | OUTPATIENT
Start: 2018-05-17 | End: 2018-07-25

## 2018-05-17 NOTE — TELEPHONE ENCOUNTER
Routing refill request to provider for review/approval because:  Drug not on the [facility] refill protocol.    Maribel Eugene RN, [facility]

## 2018-07-25 DIAGNOSIS — F43.22 ADJUSTMENT DISORDER WITH ANXIETY: ICD-10-CM

## 2018-07-25 RX ORDER — LORAZEPAM 1 MG/1
0.5-1 TABLET ORAL 3 TIMES DAILY PRN
Qty: 20 TABLET | Refills: 0 | Status: SHIPPED | OUTPATIENT
Start: 2018-07-25 | End: 2018-08-23

## 2018-07-25 NOTE — TELEPHONE ENCOUNTER
Hello,  last fill date:05-  Last quantity:30    Thank You,  Barbara Cazares  Pharmacy Technician  [facility]  [phone]

## 2018-07-25 NOTE — TELEPHONE ENCOUNTER
Requested Prescriptions   Pending Prescriptions Disp Refills     LORazepam (ATIVAN) 1 MG tablet    Last Written Prescription Date:  5/17/18  Last Fill Quantity: 30,  # refills: 0   Last Office Visit with [facility], P or [facility] prescribing provider:  2/19/18   Future Office Visit:      30 tablet 0     Sig: Take 0.5-1 tablets (0.5-1 mg) by mouth 3 times daily as needed for anxiety    There is no refill protocol information for this order              Lc Faarax  [facility]

## 2018-07-26 ENCOUNTER — MYC MEDICAL ADVICE (OUTPATIENT)
Dept: FAMILY MEDICINE | Facility: CLINIC | Age: 28
End: 2018-07-26

## 2018-07-27 ENCOUNTER — OFFICE VISIT (OUTPATIENT)
Dept: FAMILY MEDICINE | Facility: CLINIC | Age: 28
End: 2018-07-27
Payer: COMMERCIAL

## 2018-07-27 VITALS
DIASTOLIC BLOOD PRESSURE: 70 MMHG | WEIGHT: 186.6 LBS | SYSTOLIC BLOOD PRESSURE: 118 MMHG | RESPIRATION RATE: 18 BRPM | HEART RATE: 54 BPM | OXYGEN SATURATION: 97 % | TEMPERATURE: 98.3 F | HEIGHT: 71 IN | BODY MASS INDEX: 26.12 KG/M2

## 2018-07-27 DIAGNOSIS — R19.7 DIARRHEA, UNSPECIFIED TYPE: ICD-10-CM

## 2018-07-27 DIAGNOSIS — Z78.9 TAKES DIETARY SUPPLEMENTS: ICD-10-CM

## 2018-07-27 DIAGNOSIS — Z87.820 HISTORY OF MULTIPLE CONCUSSIONS: ICD-10-CM

## 2018-07-27 DIAGNOSIS — R42 DIZZINESS: Primary | ICD-10-CM

## 2018-07-27 DIAGNOSIS — E16.2 HYPOGLYCEMIA: ICD-10-CM

## 2018-07-27 LAB
ALBUMIN SERPL-MCNC: 4.4 G/DL (ref 3.4–5)
ALP SERPL-CCNC: 78 U/L (ref 40–150)
ALT SERPL W P-5'-P-CCNC: 36 U/L (ref 0–70)
ANION GAP SERPL CALCULATED.3IONS-SCNC: 6 MMOL/L (ref 3–14)
AST SERPL W P-5'-P-CCNC: 24 U/L (ref 0–45)
BILIRUB SERPL-MCNC: 0.4 MG/DL (ref 0.2–1.3)
BUN SERPL-MCNC: 18 MG/DL (ref 7–30)
CALCIUM SERPL-MCNC: 8.6 MG/DL (ref 8.5–10.1)
CHLORIDE SERPL-SCNC: 103 MMOL/L (ref 94–109)
CHOLEST SERPL-MCNC: 149 MG/DL
CO2 SERPL-SCNC: 31 MMOL/L (ref 20–32)
CREAT SERPL-MCNC: 1 MG/DL (ref 0.66–1.25)
ERYTHROCYTE [DISTWIDTH] IN BLOOD BY AUTOMATED COUNT: 13.2 % (ref 10–15)
GFR SERPL CREATININE-BSD FRML MDRD: 89 ML/MIN/1.7M2
GLUCOSE SERPL-MCNC: 65 MG/DL (ref 70–99)
HBA1C MFR BLD: 5.1 % (ref 0–5.6)
HCT VFR BLD AUTO: 42.3 % (ref 40–53)
HDLC SERPL-MCNC: 51 MG/DL
HGB BLD-MCNC: 14.5 G/DL (ref 13.3–17.7)
LDLC SERPL CALC-MCNC: 82 MG/DL
MCH RBC QN AUTO: 30.5 PG (ref 26.5–33)
MCHC RBC AUTO-ENTMCNC: 34.3 G/DL (ref 31.5–36.5)
MCV RBC AUTO: 89 FL (ref 78–100)
NONHDLC SERPL-MCNC: 98 MG/DL
PLATELET # BLD AUTO: 219 10E9/L (ref 150–450)
POTASSIUM SERPL-SCNC: 4.2 MMOL/L (ref 3.4–5.3)
PROT SERPL-MCNC: 8 G/DL (ref 6.8–8.8)
RBC # BLD AUTO: 4.75 10E12/L (ref 4.4–5.9)
SODIUM SERPL-SCNC: 140 MMOL/L (ref 133–144)
TRIGL SERPL-MCNC: 79 MG/DL
TSH SERPL DL<=0.005 MIU/L-ACNC: 0.92 MU/L (ref 0.4–4)
VIT B12 SERPL-MCNC: 382 PG/ML (ref 193–986)
WBC # BLD AUTO: 7.8 10E9/L (ref 4–11)

## 2018-07-27 PROCEDURE — 36415 COLL VENOUS BLD VENIPUNCTURE: CPT | Performed by: NURSE PRACTITIONER

## 2018-07-27 PROCEDURE — 99214 OFFICE O/P EST MOD 30 MIN: CPT | Performed by: NURSE PRACTITIONER

## 2018-07-27 PROCEDURE — 85027 COMPLETE CBC AUTOMATED: CPT | Performed by: NURSE PRACTITIONER

## 2018-07-27 PROCEDURE — 83036 HEMOGLOBIN GLYCOSYLATED A1C: CPT | Performed by: NURSE PRACTITIONER

## 2018-07-27 PROCEDURE — 82607 VITAMIN B-12: CPT | Performed by: NURSE PRACTITIONER

## 2018-07-27 PROCEDURE — 80053 COMPREHEN METABOLIC PANEL: CPT | Performed by: NURSE PRACTITIONER

## 2018-07-27 PROCEDURE — 82306 VITAMIN D 25 HYDROXY: CPT | Performed by: NURSE PRACTITIONER

## 2018-07-27 PROCEDURE — 84443 ASSAY THYROID STIM HORMONE: CPT | Performed by: NURSE PRACTITIONER

## 2018-07-27 PROCEDURE — 80061 LIPID PANEL: CPT | Performed by: NURSE PRACTITIONER

## 2018-07-27 ASSESSMENT — PAIN SCALES - GENERAL: PAINLEVEL: NO PAIN (0)

## 2018-07-27 NOTE — MR AVS SNAPSHOT
After Visit Summary   7/27/2018    Julio Oh    MRN: [id number]           Patient Information     Date Of Birth          1990        Visit Information        Provider Department      7/27/2018 1:40 PM Lulu Hernandez NP [facility]        Today's Diagnoses     Dizziness    -  1    Diarrhea, unspecified type        Takes dietary supplements        History of multiple concussions          Care Instructions    Follow up pending labs            Follow-ups after your visit        Who to contact     If you have questions or need follow up information about today's clinic visit or your schedule please contact [facility] directly at [phone].  Normal or non-critical lab and imaging results will be communicated to you by MyChart, letter or phone within 4 business days after the clinic has received the results. If you do not hear from us within 7 days, please contact the clinic through R2[employer] or phone. If you have a critical or abnormal lab result, we will notify you by phone as soon as possible.  Submit refill requests through [employer] or call your pharmacy and they will forward the refill request to us. Please allow 3 business days for your refill to be completed.          Additional Information About Your Visit        MyChart Information     [employer] gives you secure access to your electronic health record. If you see a primary care provider, you can also send messages to your care team and make appointments. If you have questions, please call your primary care clinic.  If you do not have a primary care provider, please call [phone] and they will assist you.        Care EveryWhere ID     This is your Care EveryWhere ID. This could be used by other organizations to access your [facility] medical records  [id number]        Your Vitals Were     Pulse Temperature Respirations Height Pulse Oximetry BMI (Body Mass Index)    54 98.3  F (36.8  C) (Oral) 18 1.803 m (5'  "11\") 97% 26.03 kg/m2       Blood Pressure from Last 3 Encounters:   07/27/18 118/70   02/19/18 118/72   07/05/17 118/68    Weight from Last 3 Encounters:   07/27/18 84.6 kg (186 lb 9.6 oz)   03/22/18 84.6 kg (186 lb 8 oz)   02/19/18 86.2 kg (190 lb)              We Performed the Following     CBC with platelets     Comprehensive metabolic panel (BMP + Alb, Alk Phos, ALT, AST, Total. Bili, TP)     Hemoglobin A1c     Lipid panel reflex to direct LDL Fasting     TSH with free T4 reflex     Vitamin B12     Vitamin D Deficiency        Primary Care Provider Office Phone # Fax #    Jenifer Lavon Boss [phone] [address] [facility] [address]        Equal Access to Services     CLARENCE SAPP : Hadii mason valdez hadasho Soomaali, waaxda luqadaha, qaybta kaalmada adeegyaba, celestino ibanez . So [facility] [phone].    ATENCIÓN: Si habla español, tiene a mac disposición servicios gratuitos de asistencia lingüística. Les al [phone].    We comply with applicable federal civil rights laws and [place] laws. We do not discriminate on the basis of race, color, national origin, age, disability, sex, sexual orientation, or gender identity.            Thank you!     Thank you for choosing [facility]  for your care. Our goal is always to provide you with excellent care. Hearing back from our patients is one way we can continue to improve our services. Please take a few minutes to complete the written survey that you may receive in the mail after your visit with us. Thank you!             Your Updated Medication List - Protect others around you: Learn how to safely use, store and throw away your medicines at www.disposemymeds.org.          This list is accurate as of 7/27/18  2:12 PM.  Always use your most recent med list.                   Brand Name Dispense Instructions for use Diagnosis    buPROPion 75 MG tablet    WELLBUTRIN    180 tablet    Take 1 " tablet (75 mg) by mouth 2 times daily    Adjustment disorder with anxiety       cholecalciferol 1000 UNIT tablet    vitamin D3    100 tablet    Take 1 tablet (1,000 Units) by mouth daily    Takes dietary supplements       ibuprofen 800 MG tablet    ADVIL/MOTRIN    30 tablet    Take 1 tablet (800 mg) by mouth every 8 hours as needed for moderate pain    Acute sinusitis with symptoms > 10 days       LORazepam 1 MG tablet    ATIVAN    20 tablet    Take 0.5-1 tablets (0.5-1 mg) by mouth 3 times daily as needed for anxiety    Adjustment disorder with anxiety       * PARoxetine 20 MG tablet    PAXIL    90 tablet    Take 1 tablet (20 mg) by mouth daily With 30 mg to make 50 mg daily    Adjustment disorder with anxiety       * PARoxetine 30 MG tablet    PAXIL    90 tablet    Take 1 tablet (30 mg) by mouth daily With 20 mg to make 50 mg daily    Adjustment disorder with anxiety       traZODone 50 MG tablet    DESYREL    90 tablet    TAKE 1 TABLET (50MG) BY MOUTH NIGHTLY FOR SLEEP    Primary insomnia       vitamin B complex with vitamin C Tabs tablet      Take 1 tablet by mouth daily    Takes dietary supplements       * Notice:  This list has 2 medication(s) that are the same as other medications prescribed for you. Read the directions carefully, and ask your doctor or other care provider to review them with you.

## 2018-07-27 NOTE — PROGRESS NOTES
SUBJECTIVE:   Julio Oh is a 27 year old male who presents to clinic today for the following health issues:      Dizziness  Onset: a year ago    Description:   Do you feel faint:  YES  Does it feel like the surroundings (bed, room) are moving: YES  Unsteady/off balance: YES  Have you passed out or fallen: no     Intensity: moderate, severe    Progression of Symptoms:  intermittent    Accompanying Signs & Symptoms:  Heart palpitations: no   Nausea, vomiting: YES- nausea  Weakness in arms or legs: YES  Fatigue: YES  Vision or speech changes: YES- can't get words out sometimes  Ringing in ears (Tinnitus): no   Hearing Loss: no     History:   Head trauma/concussion hx: YES-  Concussions   Previous similar symptoms: YES  Recent bleeding history: no     Precipitating factors:   Worse with activity or head movement: no   Any new medications (BP?): no   Alcohol/drug abuse/withdrawal: no     Alleviating factors:   Does staying in a fixed position give relief:  YES    Therapies Tried and outcome: cold water helps    Doesn't happen too often-once a month, he always thought it was something that he ate and it goes away. Also having loose stools. No vision changes, has not fallen. Sometimes trouble getting words out. Hx of concussions-last one 8-10 yr ago. Had a few when younger also.  Did have post-concussion syndrome---wondering if need to refer to neurology also??    Also does non-striking martial arts.     Gets sweaty, nauseated during these episodes. Feel like fever or chills at times.   Denies blood/black in stool or urine.    Depression: increased paxil to 50 mg in February. Using ativan a few times a week. Feels he has more anxiety than depression. Also on Wellbutrin at 75 mg -takes daily. Feels like anxiety is somewhat controlled. Anytime he has alcohol the day after he has lots of anxiety.  He notes anymore than 5-6 beers feels more anxious the next day. So he avoids drinking too much for that reason.     Uses  trazodone nightly, it helps.     Takes b-vitamin pill. Also a brain pill.      Problem list and histories reviewed & adjusted, as indicated.  Additional history: as documented    Patient Active Problem List   Diagnosis     Closed fracture of tibia     Insomnia     Adjustment disorder with anxiety     CARDIOVASCULAR SCREENING; LDL GOAL LESS THAN 160     CHL (conductive hearing loss)     Tympanic membrane perforation, right     Right knee pain     Knee injury, left, initial encounter     Acute pain of left knee     Past Surgical History:   Procedure Laterality Date     HC CREATE EARDRUM OPENING,GEN ANESTH      P.E. Tubes     TYMPANOPLASTY Right 7/21/2016    Procedure: TYMPANOPLASTY;  Surgeon: Andres Joyce MD;  Location:  OR       Social History   Substance Use Topics     Smoking status: Never Smoker     Smokeless tobacco: Never Used      Comment: no nicotine exposure     Alcohol use No     Family History   Problem Relation Age of Onset     Diabetes Maternal Grandmother      Type II     Cancer - colorectal Maternal Grandfather      Eye Disorder Maternal Grandfather      cataracts     Prostate Cancer Maternal Grandfather      HEART DISEASE Paternal Grandmother      bypass     HEART DISEASE Paternal Grandfather      bypass         Current Outpatient Prescriptions   Medication Sig Dispense Refill     buPROPion (WELLBUTRIN) 75 MG tablet Take 1 tablet (75 mg) by mouth 2 times daily 180 tablet 3     cholecalciferol (VITAMIN D3) 1000 UNIT tablet Take 1 tablet (1,000 Units) by mouth daily 100 tablet 3     ibuprofen (ADVIL,MOTRIN) 800 MG tablet Take 1 tablet (800 mg) by mouth every 8 hours as needed for moderate pain 30 tablet 1     LORazepam (ATIVAN) 1 MG tablet Take 0.5-1 tablets (0.5-1 mg) by mouth 3 times daily as needed for anxiety 20 tablet 0     PARoxetine (PAXIL) 20 MG tablet Take 1 tablet (20 mg) by mouth daily With 30 mg to make 50 mg daily 90 tablet 1     PARoxetine (PAXIL) 30 MG tablet Take 1 tablet (30  "mg) by mouth daily With 20 mg to make 50 mg daily 90 tablet 1     traZODone (DESYREL) 50 MG tablet TAKE 1 TABLET (50MG) BY MOUTH NIGHTLY FOR SLEEP 90 tablet 1     vitamin B complex with vitamin C (VITAMIN  B COMPLEX) TABS tablet Take 1 tablet by mouth daily  0     Allergies   Allergen Reactions     No Known Drug Allergies        Reviewed and updated as needed this visit by clinical staff  Tobacco  Allergies  Meds  Problems  Med Hx  Surg Hx  Fam Hx  Soc Hx        Reviewed and updated as needed this visit by Provider  Allergies  Meds  Problems         ROS:  Constitutional, HEENT, cardiovascular, pulmonary, gi and gu systems are negative, except as otherwise noted.    OBJECTIVE:     /70 (BP Location: Right arm, Patient Position: Sitting, Cuff Size: Adult Regular)  Pulse 54  Temp 98.3  F (36.8  C) (Oral)  Resp 18  Ht 1.803 m (5' 11\")  Wt 84.6 kg (186 lb 9.6 oz)  SpO2 97%  BMI 26.03 kg/m2  Body mass index is 26.03 kg/(m^2).  GENERAL: tired, alert and no distress  EYES: Eyes grossly normal to inspection, PERRL and conjunctivae and sclerae normal  HENT: ear canals and TM's normal, nose and mouth without ulcers or lesions  NECK: no adenopathy, no asymmetry, masses, or scars and thyroid normal to palpation  RESP: lungs clear to auscultation - no rales, rhonchi or wheezes  CV: regular rate and rhythm, normal S1 S2, no S3 or S4, no murmur, click or rub  ABDOMEN: soft, nontender, no hepatosplenomegaly, no masses and bowel sounds normal  MS: no gross musculoskeletal defects noted, no edema  NEURO: Normal strength and tone, mentation intact and speech normal EOM normal    Diagnostic Test Results:  none     ASSESSMENT/PLAN:       1. Dizziness  Assess for metabolic changes. Patient had increase in paxil in February. Symptoms have not increased since then but have continued. It is possible the rare side effects of headaches/dizziness/diarrhea are related to his paxil?  - Comprehensive metabolic panel (BMP + " Alb, Alk Phos, ALT, AST, Total. Bili, TP)  - TSH with free T4 reflex  - Vitamin B12  - Vitamin D Deficiency  - CBC with platelets  - Lipid panel reflex to direct LDL Fasting  - Hemoglobin A1c  -PT-vestibular    2. Diarrhea, unspecified type  On/off, check for metabolic changes, unlikely infectious  - Comprehensive metabolic panel (BMP + Alb, Alk Phos, ALT, AST, Total. Bili, TP)    3. Takes dietary supplements  As above  - vitamin B complex with vitamin C (VITAMIN  B COMPLEX) TABS tablet; Take 1 tablet by mouth daily; Refill: 0  - cholecalciferol (VITAMIN D3) 1000 UNIT tablet; Take 1 tablet (1,000 Units) by mouth daily  Dispense: 100 tablet; Refill: 3    4. History of multiple concussions  As above. Un likely post-concussive syndrome, no recent trauma  - Vitamin B12  - Vitamin D Deficiency    5. Hypoglycemia  Blood noted slightly low blood sugar. Encourage to eat every few hours.       Follow up depending on labs-all labs mostly negative. Follow up with PT, possible if not inner ear could be related to delayed post-concussive syndrome? Maybe referral to Neuro if dizziness continues?     PATRICK Quiroz, NP-C  [facility]

## 2018-07-27 NOTE — TELEPHONE ENCOUNTER
Recommend to be seen today in clinic if availability or go to ER if symptoms worsening.  PATRICK Quiroz, NP-C

## 2018-07-27 NOTE — TELEPHONE ENCOUNTER
Patient reports symptoms in My Chart message of hot flashes, night sweats, fatigue. RN called patient to triage him. He reports that he has had these symptoms on and off for about 1 year.  During the day he has episodes of hot flashes accompanied with the following: weakness, dizziness, nausea, excessive thirst, extreme fatigue,body shaking.    RN advised that he should be seen today and RN assisted him making an appointment to be evaluated in clinic today.  Sarah Alanis RN

## 2018-07-29 LAB — DEPRECATED CALCIDIOL+CALCIFEROL SERPL-MC: 91 UG/L (ref 20–75)

## 2018-07-30 NOTE — TELEPHONE ENCOUNTER
Patient seen in clinic on 7/27/18 by Lulu Hernandez NP for evaluation. See chart review and encounter for details.    Gale Chacon RN  [facility] Triage

## 2018-08-23 ENCOUNTER — TELEPHONE (OUTPATIENT)
Dept: FAMILY MEDICINE | Facility: CLINIC | Age: 28
End: 2018-08-23

## 2018-08-23 DIAGNOSIS — F43.22 ADJUSTMENT DISORDER WITH ANXIETY: ICD-10-CM

## 2018-08-23 NOTE — TELEPHONE ENCOUNTER
Requested Prescriptions   Pending Prescriptions Disp Refills     LORazepam (ATIVAN) 1 MG tablet 20 tablet 0     Sig: Take 0.5-1 tablets (0.5-1 mg) by mouth 3 times daily as needed for anxiety    There is no refill protocol information for this order        LORazepam (ATIVAN) 1 MG tablet      Last Written Prescription Date:  7/27/18  Last Fill Quantity: 20,   # refills: 0  Last Office Visit: 7/27/18  Future Office visit:       Routing refill request to provider for review/approval because:  Drug not on the [facility] or [facility] refill protocol or controlled substance

## 2018-08-24 ENCOUNTER — OFFICE VISIT (OUTPATIENT)
Dept: URGENT CARE | Facility: RETAIL CLINIC | Age: 28
End: 2018-08-24
Payer: COMMERCIAL

## 2018-08-24 VITALS — TEMPERATURE: 98.1 F | DIASTOLIC BLOOD PRESSURE: 79 MMHG | SYSTOLIC BLOOD PRESSURE: 127 MMHG | HEART RATE: 57 BPM

## 2018-08-24 DIAGNOSIS — J01.90 ACUTE SINUSITIS WITH COEXISTING CONDITION, NEED PROPHYLACTIC TREATMENT: Primary | ICD-10-CM

## 2018-08-24 PROCEDURE — 99203 OFFICE O/P NEW LOW 30 MIN: CPT | Performed by: PHYSICIAN ASSISTANT

## 2018-08-24 RX ORDER — MELOXICAM 15 MG/1
TABLET ORAL
COMMUNITY
Start: 2018-08-06 | End: 2019-02-11

## 2018-08-24 RX ORDER — FLUTICASONE PROPIONATE 50 MCG
1-2 SPRAY, SUSPENSION (ML) NASAL DAILY
Qty: 1 BOTTLE | Refills: 11 | Status: SHIPPED | OUTPATIENT
Start: 2018-08-24 | End: 2018-10-09

## 2018-08-24 RX ORDER — LORAZEPAM 1 MG/1
0.5-1 TABLET ORAL 3 TIMES DAILY PRN
Qty: 20 TABLET | Refills: 0 | Status: SHIPPED | OUTPATIENT
Start: 2018-08-24 | End: 2018-09-21

## 2018-08-24 NOTE — PROGRESS NOTES
Chief Complaint   Patient presents with     Sinus Problem     since last saturday, sinus pressure, congestion, upper tooth pain, pt thinks feverish and night sweats     SUBJECTIVE:  Julio Oh is a 27 year old male here with concerns about sinus infection.  He states onset of symptoms was 7 days ago.    Course of illness is worsening.   Severity moderate  He has had maxillary pressure as well as fever, night sweats, nasal congestion and tooth pain  Predisposing factors include HX of recurrent sinusitis.   Recent treatment has included: OTC meds    Past Medical History:   Diagnosis Date     NO ACTIVE PROBLEMS      Current Outpatient Prescriptions   Medication Sig Dispense Refill     amoxicillin-clavulanate (AUGMENTIN) 875-125 MG per tablet Take 1 tablet by mouth 2 times daily for 10 days 20 tablet 0     fluticasone (FLONASE) 50 MCG/ACT spray Spray 1-2 sprays into both nostrils daily 1 Bottle 11     LORazepam (ATIVAN) 1 MG tablet Take 0.5-1 tablets (0.5-1 mg) by mouth 3 times daily as needed for anxiety 20 tablet 0     PARoxetine (PAXIL) 20 MG tablet Take 1 tablet (20 mg) by mouth daily With 30 mg to make 50 mg daily 90 tablet 1     PARoxetine (PAXIL) 30 MG tablet Take 1 tablet (30 mg) by mouth daily With 20 mg to make 50 mg daily 90 tablet 1     traZODone (DESYREL) 50 MG tablet TAKE 1 TABLET (50MG) BY MOUTH NIGHTLY FOR SLEEP 90 tablet 1     vitamin B complex with vitamin C (VITAMIN  B COMPLEX) TABS tablet Take 1 tablet by mouth daily  0     buPROPion (WELLBUTRIN) 75 MG tablet Take 1 tablet (75 mg) by mouth 2 times daily (Patient not taking: Reported on 8/24/2018) 180 tablet 3     cholecalciferol (VITAMIN D3) 1000 UNIT tablet Take 1 tablet (1,000 Units) by mouth daily 100 tablet 3     ibuprofen (ADVIL,MOTRIN) 800 MG tablet Take 1 tablet (800 mg) by mouth every 8 hours as needed for moderate pain (Patient not taking: Reported on 8/24/2018) 30 tablet 1     meloxicam (MOBIC) 15 MG tablet        Social History    Substance Use Topics     Smoking status: Never Smoker     Smokeless tobacco: Never Used      Comment: no nicotine exposure     Alcohol use No     Allergies   Allergen Reactions     No Known Drug Allergies      REVIEW OF SYSTEMS  General: POSITIVE for fever, chills, night sweats, headache.  Eyes: NEGATIVE for negative, redness, tearing, itching.  ENT: POSITIVE for ear pain and fullness, sinus congestion, postnasal drainage.  Resp: NEGATIVE for cough, wheezing and SOB.    OBJECTIVE:  /79 (BP Location: Left arm)  Pulse 57  Temp 98.1  F (36.7  C) (Oral)  GENERAL APPEARANCE: healthy, alert and no distress  EYES: PERRL, conjunctiva clear  HENT: Pain with palpation over frontal and maxillary sinuses. Ear canals normal TMs with mild serous effusions bilaterally. Nasal turbinates edematous and boggy bilaterally. Posterior pharynx is not erythematous.  NECK: supple, nontender, no lymphadenopathy  RESP: lungs clear to auscultation - no rales, rhonchi or wheezes  CV: regular rates and rhythm, normal S1 S2, no murmur noted    ASSESSMENT:    ICD-10-CM    1. Acute sinusitis with coexisting condition, need prophylactic treatment J01.90 amoxicillin-clavulanate (AUGMENTIN) 875-125 MG per tablet     fluticasone (FLONASE) 50 MCG/ACT spray     PLAN:   Patient Instructions   Augmentin (amoxicillin-clavulanate) 875mg twice daily for 10 days as directed.  Flonase (fluticasone) 2 sprays in each nostril daily until symptoms resolve, then continue 1 spray in each nostril for at least 5 more days.  Afrin (oxymetazoline) nasal spray twice daily for 3 days. Stop after 3 days.  Take an antihistamine such as Claritin (loratadine), Zyrtec (cetirizine) or Allegra (fexofenadine) daily for allergy symptoms.  Mucinex (guiafenesin) thins mucus and may help it to loosen more quickly  Take Tylenol or an NSAID such as ibuprofen or naproxen as needed for pain.  Sudafed (pseudoephedrine) behind the pharmacist counter for 3-5 days helps relieve  congestion.  Contact primary care clinic if you do not have any relief from your symptoms after 10 days.  Present to emergency room for significantly increasing pain, persistent high fever >102F, swelling/redness around your eyes, changes in your vision or ability to move your eyes, altered mental status or a severe headache.    Follow up with primary care provider with any problems, questions or concerns or if symptoms worsen or fail to improve. Patient agreed to plan and verbalized understanding.    Gaye Bird PA-C  [facility]

## 2018-08-24 NOTE — TELEPHONE ENCOUNTER
CALL LVM. Informed per note below to see PCP after this Ativan fax. If additional questions give us a call back.    Luz Webb MA

## 2018-08-24 NOTE — MR AVS SNAPSHOT
After Visit Summary   8/24/2018    Julio Oh    MRN: [id number]           Patient Information     Date Of Birth          1990        Visit Information        Provider Department      8/24/2018 2:30 PM Radha Bird PA-C [facility]        Today's Diagnoses     Acute sinusitis with coexisting condition, need prophylactic treatment    -  1      Care Instructions    Augmentin (amoxicillin-clavulanate) 875mg twice daily for 10 days as directed.  Flonase (fluticasone) 2 sprays in each nostril daily until symptoms resolve, then continue 1 spray in each nostril for at least 5 more days.  Afrin (oxymetazoline) nasal spray twice daily for 3 days. Stop after 3 days.  Take an antihistamine such as Claritin (loratadine), Zyrtec (cetirizine) or Allegra (fexofenadine) daily for allergy symptoms.  Mucinex (guiafenesin) thins mucus and may help it to loosen more quickly  Take Tylenol or an NSAID such as ibuprofen or naproxen as needed for pain.  Sudafed (pseudoephedrine) behind the pharmacist counter for 3-5 days helps relieve congestion.  Contact primary care clinic if you do not have any relief from your symptoms after 10 days.  Present to emergency room for significantly increasing pain, persistent high fever >102F, swelling/redness around your eyes, changes in your vision or ability to move your eyes, altered mental status or a severe headache.          Follow-ups after your visit        Who to contact     You can reach your care team any time of the day by calling [phone].  Notification of test results:  If you have an abnormal lab result, we will notify you by phone as soon as possible.         Additional Information About Your Visit        Guide[employer] Information     [employer] gives you secure access to your electronic health record. If you see a primary care provider, you can also send messages to your care team and make appointments. If you have questions, please call your  primary care clinic.  If you do not have a primary care provider, please call [phone] and they will assist you.        Care EveryWhere ID     This is your Care EveryWhere ID. This could be used by other organizations to access your [facility] medical records  [id number]        Your Vitals Were     Pulse Temperature                57 98.1  F (36.7  C) (Oral)           Blood Pressure from Last 3 Encounters:   08/24/18 127/79   07/27/18 118/70   02/19/18 118/72    Weight from Last 3 Encounters:   07/27/18 186 lb 9.6 oz (84.6 kg)   03/22/18 186 lb 8 oz (84.6 kg)   02/19/18 190 lb (86.2 kg)              Today, you had the following     No orders found for display         Today's Medication Changes          These changes are accurate as of 8/24/18  2:49 PM.  If you have any questions, ask your nurse or doctor.               Start taking these medicines.        Dose/Directions    amoxicillin-clavulanate 875-125 MG per tablet   Commonly known as:  AUGMENTIN   Used for:  Acute sinusitis with coexisting condition, need prophylactic treatment        Dose:  1 tablet   Take 1 tablet by mouth 2 times daily for 10 days   Quantity:  20 tablet   Refills:  0       fluticasone 50 MCG/ACT spray   Commonly known as:  FLONASE   Used for:  Acute sinusitis with coexisting condition, need prophylactic treatment        Dose:  1-2 spray   Spray 1-2 sprays into both nostrils daily   Quantity:  1 Bottle   Refills:  11            Where to get your medicines      These medications were sent to [id number] - [place], [place] - [address] [address]  [address] [address]     Phone:  [phone]     amoxicillin-clavulanate 875-125 MG per tablet    fluticasone 50 MCG/ACT spray                Primary Care Provider Office Phone # Fax #    Jenifer Boss [phone] [address] [address]        Equal Access to Services     CLARENCE SAPP AH: liu Navarrete  zechariah teresosantos caromike roxannacelestino stapleton ah. So [facility] [phone].    ATENCIÓN: Si juan armando, tiene a mac disposición servicios gratuitos de asistencia lingüística. Les al [phone].    We comply with applicable federal civil rights laws and [place] laws. We do not discriminate on the basis of race, color, national origin, age, disability, sex, sexual orientation, or gender identity.            Thank you!     Thank you for choosing [facility]  for your care. Our goal is always to provide you with excellent care. Hearing back from our patients is one way we can continue to improve our services. Please take a few minutes to complete the written survey that you may receive in the mail after your visit with us. Thank you!             Your Updated Medication List - Protect others around you: Learn how to safely use, store and throw away your medicines at www.disposemymeds.org.          This list is accurate as of 8/24/18  2:49 PM.  Always use your most recent med list.                   Brand Name Dispense Instructions for use Diagnosis    amoxicillin-clavulanate 875-125 MG per tablet    AUGMENTIN    20 tablet    Take 1 tablet by mouth 2 times daily for 10 days    Acute sinusitis with coexisting condition, need prophylactic treatment       buPROPion 75 MG tablet    WELLBUTRIN    180 tablet    Take 1 tablet (75 mg) by mouth 2 times daily    Adjustment disorder with anxiety       cholecalciferol 1000 UNIT tablet    vitamin D3    100 tablet    Take 1 tablet (1,000 Units) by mouth daily    Takes dietary supplements       fluticasone 50 MCG/ACT spray    FLONASE    1 Bottle    Spray 1-2 sprays into both nostrils daily    Acute sinusitis with coexisting condition, need prophylactic treatment       ibuprofen 800 MG tablet    ADVIL/MOTRIN    30 tablet    Take 1 tablet (800 mg) by mouth every 8 hours as needed for moderate pain    Acute sinusitis with symptoms > 10  days       LORazepam 1 MG tablet    ATIVAN    20 tablet    Take 0.5-1 tablets (0.5-1 mg) by mouth 3 times daily as needed for anxiety    Adjustment disorder with anxiety       meloxicam 15 MG tablet    MOBIC          * PARoxetine 20 MG tablet    PAXIL    90 tablet    Take 1 tablet (20 mg) by mouth daily With 30 mg to make 50 mg daily    Adjustment disorder with anxiety       * PARoxetine 30 MG tablet    PAXIL    90 tablet    Take 1 tablet (30 mg) by mouth daily With 20 mg to make 50 mg daily    Adjustment disorder with anxiety       traZODone 50 MG tablet    DESYREL    90 tablet    TAKE 1 TABLET (50MG) BY MOUTH NIGHTLY FOR SLEEP    Primary insomnia       vitamin B complex with vitamin C Tabs tablet      Take 1 tablet by mouth daily    Takes dietary supplements       * Notice:  This list has 2 medication(s) that are the same as other medications prescribed for you. Read the directions carefully, and ask your doctor or other care provider to review them with you.

## 2018-08-24 NOTE — TELEPHONE ENCOUNTER
Reviewed [facility] and no fills outside of this office.  Last filled 7/26/18   Hasn't seen PCP since 2/19/2018  Given 20 tablets.  Please fax prescription and notify patient needs to schedule an appointment with PCP prior to any additional refills

## 2018-08-24 NOTE — PATIENT INSTRUCTIONS
Augmentin (amoxicillin-clavulanate) 875mg twice daily for 10 days as directed.  Flonase (fluticasone) 2 sprays in each nostril daily until symptoms resolve, then continue 1 spray in each nostril for at least 5 more days.  Afrin (oxymetazoline) nasal spray twice daily for 3 days. Stop after 3 days.  Take an antihistamine such as Claritin (loratadine), Zyrtec (cetirizine) or Allegra (fexofenadine) daily for allergy symptoms.  Mucinex (guiafenesin) thins mucus and may help it to loosen more quickly  Take Tylenol or an NSAID such as ibuprofen or naproxen as needed for pain.  Sudafed (pseudoephedrine) behind the pharmacist counter for 3-5 days helps relieve congestion.  Contact primary care clinic if you do not have any relief from your symptoms after 10 days.  Present to emergency room for significantly increasing pain, persistent high fever >102F, swelling/redness around your eyes, changes in your vision or ability to move your eyes, altered mental status or a severe headache.

## 2018-09-17 DIAGNOSIS — F43.22 ADJUSTMENT DISORDER WITH ANXIETY: ICD-10-CM

## 2018-09-17 RX ORDER — PAROXETINE 30 MG/1
TABLET, FILM COATED ORAL
Qty: 90 TABLET | Refills: 0 | Status: SHIPPED | OUTPATIENT
Start: 2018-09-17 | End: 2018-12-26

## 2018-09-17 NOTE — TELEPHONE ENCOUNTER
Hello,  last fill date:07-  Last quantity:90    Thank You,  Barbara Cazares  Pharmacy Technician  [facility]  [phone]

## 2018-09-18 RX ORDER — PAROXETINE 20 MG/1
TABLET, FILM COATED ORAL
Qty: 90 TABLET | Refills: 1 | OUTPATIENT
Start: 2018-09-18

## 2018-09-18 NOTE — TELEPHONE ENCOUNTER
"Requested Prescriptions   Pending Prescriptions Disp Refills     PARoxetine (PAXIL) 20 MG tablet [Pharmacy Med Name: PAROXETINE HCL 20MG TABS]  May be duplicate request. Filled 9/17/18.  Last office visit: 7/27/2018 with prescribing provider:  Dr. Boss   Future Office Visit:   90 tablet 1     Sig: TAKE ONE TABLET BY MOUTH EVERY DAY WITH 30 MG TO MAKE 50 MG DAILY    SSRIs Protocol Passed    9/17/2018  4:20 PM       Passed - Recent (12 mo) or future (30 days) visit within the authorizing provider's specialty    Patient had office visit in the last 12 months or has a visit in the next 30 days with authorizing provider or within the authorizing provider's specialty.  See \"Patient Info\" tab in inbasket, or \"Choose Columns\" in Meds & Orders section of the refill encounter.           Passed - Patient is age 18 or older        Medication Detail      Disp Refills Start End NINA   PARoxetine (PAXIL) 30 MG tablet 90 tablet 0 9/17/2018  No   Sig: TAKE ONE TABLET BY MOUTH EVERY DAY WITH 20 MG TO MAKE 50 MG DAILY   Class: E-Prescribe   Order: [id number]   E-Prescribing Status: Receipt confirmed by pharmacy (9/17/2018  9:16 AM CDT)       "

## 2018-09-19 ENCOUNTER — MYC MEDICAL ADVICE (OUTPATIENT)
Dept: FAMILY MEDICINE | Facility: CLINIC | Age: 28
End: 2018-09-19

## 2018-09-19 DIAGNOSIS — F43.22 ADJUSTMENT DISORDER WITH ANXIETY: ICD-10-CM

## 2018-09-19 RX ORDER — PAROXETINE 20 MG/1
20 TABLET, FILM COATED ORAL DAILY
Qty: 90 TABLET | Refills: 1 | Status: SHIPPED | OUTPATIENT
Start: 2018-09-19 | End: 2019-03-22

## 2018-09-19 NOTE — TELEPHONE ENCOUNTER
Routing refill request to provider for review/approval because:  (F43.22) Adjustment disorder with anxiety  Comment: We will increase to 50 mg daily of the Paxil maintain the other medications  Plan: PARoxetine (PAXIL) 20 MG tablet, PARoxetine         (PAXIL) 30 MG tablet, LORazepam (ATIVAN) 1 MG         tablet    Patient has not follow up on the dosage change.    Maribel Eugene RN, [facility] Triage

## 2018-09-19 NOTE — TELEPHONE ENCOUNTER
Hi,    May we have a refill on his 20mg too?  He uses both to get 50 mg per day.  appt soon.    Chandler Smith Pharm. D.  [facility] Pharmacy Manager  [phone]  9/19/2018

## 2018-09-21 ENCOUNTER — MYC REFILL (OUTPATIENT)
Dept: FAMILY MEDICINE | Facility: CLINIC | Age: 28
End: 2018-09-21

## 2018-09-21 DIAGNOSIS — F43.22 ADJUSTMENT DISORDER WITH ANXIETY: ICD-10-CM

## 2018-09-21 RX ORDER — LORAZEPAM 1 MG/1
0.5-1 TABLET ORAL 3 TIMES DAILY PRN
Qty: 20 TABLET | Refills: 0 | Status: SHIPPED | OUTPATIENT
Start: 2018-09-21 | End: 2018-10-09

## 2018-09-21 NOTE — TELEPHONE ENCOUNTER
Message from [employer]:  Original authorizing provider: SAMANTHA Flores would like a refill of the following medications:  LORazepam (ATIVAN) 1 MG tablet [Hermelinda Wong PA-C]    Preferred pharmacy: [facility] [address], [address]    Comment:

## 2018-10-09 ENCOUNTER — OFFICE VISIT (OUTPATIENT)
Dept: FAMILY MEDICINE | Facility: CLINIC | Age: 28
End: 2018-10-09
Payer: COMMERCIAL

## 2018-10-09 VITALS
OXYGEN SATURATION: 99 % | HEIGHT: 71 IN | SYSTOLIC BLOOD PRESSURE: 120 MMHG | DIASTOLIC BLOOD PRESSURE: 72 MMHG | BODY MASS INDEX: 26.46 KG/M2 | TEMPERATURE: 98.4 F | HEART RATE: 60 BPM | WEIGHT: 189 LBS

## 2018-10-09 DIAGNOSIS — F43.22 ADJUSTMENT DISORDER WITH ANXIETY: ICD-10-CM

## 2018-10-09 DIAGNOSIS — A63.0 GENITAL WARTS: ICD-10-CM

## 2018-10-09 DIAGNOSIS — F33.1 MODERATE EPISODE OF RECURRENT MAJOR DEPRESSIVE DISORDER (H): Primary | ICD-10-CM

## 2018-10-09 PROCEDURE — 99214 OFFICE O/P EST MOD 30 MIN: CPT | Performed by: FAMILY MEDICINE

## 2018-10-09 RX ORDER — BUPROPION HYDROCHLORIDE 75 MG/1
TABLET ORAL
COMMUNITY
Start: 2017-12-20 | End: 2018-12-03

## 2018-10-09 RX ORDER — VENLAFAXINE HYDROCHLORIDE 37.5 MG/1
37.5 CAPSULE, EXTENDED RELEASE ORAL DAILY
Qty: 30 CAPSULE | Refills: 0 | Status: SHIPPED | OUTPATIENT
Start: 2018-10-09 | End: 2018-11-14

## 2018-10-09 RX ORDER — PODOFILOX 5 MG/ML
SOLUTION TOPICAL 2 TIMES DAILY
Qty: 3.5 ML | Refills: 2 | Status: SHIPPED | OUTPATIENT
Start: 2018-10-09 | End: 2019-11-14

## 2018-10-09 RX ORDER — LORAZEPAM 1 MG/1
0.5-1 TABLET ORAL 3 TIMES DAILY PRN
Qty: 30 TABLET | Refills: 2 | Status: SHIPPED | OUTPATIENT
Start: 2018-10-09 | End: 2019-03-22

## 2018-10-09 ASSESSMENT — ANXIETY QUESTIONNAIRES
2. NOT BEING ABLE TO STOP OR CONTROL WORRYING: NEARLY EVERY DAY
5. BEING SO RESTLESS THAT IT IS HARD TO SIT STILL: MORE THAN HALF THE DAYS
1. FEELING NERVOUS, ANXIOUS, OR ON EDGE: NEARLY EVERY DAY
7. FEELING AFRAID AS IF SOMETHING AWFUL MIGHT HAPPEN: SEVERAL DAYS
GAD7 TOTAL SCORE: 16
IF YOU CHECKED OFF ANY PROBLEMS ON THIS QUESTIONNAIRE, HOW DIFFICULT HAVE THESE PROBLEMS MADE IT FOR YOU TO DO YOUR WORK, TAKE CARE OF THINGS AT HOME, OR GET ALONG WITH OTHER PEOPLE: VERY DIFFICULT
3. WORRYING TOO MUCH ABOUT DIFFERENT THINGS: NEARLY EVERY DAY
6. BECOMING EASILY ANNOYED OR IRRITABLE: SEVERAL DAYS

## 2018-10-09 ASSESSMENT — PATIENT HEALTH QUESTIONNAIRE - PHQ9: 5. POOR APPETITE OR OVEREATING: NEARLY EVERY DAY

## 2018-10-09 ASSESSMENT — PAIN SCALES - GENERAL: PAINLEVEL: SEVERE PAIN (6)

## 2018-10-09 NOTE — MR AVS SNAPSHOT
After Visit Summary   10/9/2018    Julio Oh    MRN: [id number]           Patient Information     Date Of Birth          1990        Visit Information        Provider Department      10/9/2018 11:20 AM Jenifer Boss MD [facility]        Today's Diagnoses     Moderate episode of recurrent major depressive disorder (H)    -  1    Adjustment disorder with anxiety           Follow-ups after your visit        Additional Services     MENTAL HEALTH REFERRAL  - Adult; Outpatient Treatment; Individual/Couples/Family/Group Therapy/Health Psychology; [facility] [phone]; We will contact you to schedule the appointment or please call with any questions       All scheduling is subject to the client's specific insurance plan & benefits, provider/location availability, and provider clinical specialities.  Please arrive 15 minutes early for your first appointment and bring your completed paperwork.    Please be aware that coverage of these services is subject to the terms and limitations of your health insurance plan.  Call member services at your health plan with any benefit or coverage questions.                            Follow-up notes from your care team     Return in about 2 weeks (around 10/23/2018) for Contact me with progress.      Who to contact     If you have questions or need follow up information about today's clinic visit or your schedule please contact [facility] directly at [phone].  Normal or non-critical lab and imaging results will be communicated to you by MyChart, letter or phone within 4 business days after the clinic has received the results. If you do not hear from us within 7 days, please contact the clinic through MyChart or phone. If you have a critical or abnormal lab result, we will notify you by phone as soon as possible.  Submit refill requests through [employer] or call your pharmacy and they will  "forward the refill request to us. Please allow 3 business days for your refill to be completed.          Additional Information About Your Visit        sougouhart Information     [email or web address] gives you secure access to your electronic health record. If you see a primary care provider, you can also send messages to your care team and make appointments. If you have questions, please call your primary care clinic.  If you do not have a primary care provider, please call [phone] and they will assist you.        Care EveryWhere ID     This is your Care EveryWhere ID. This could be used by other organizations to access your [place] medical records  [id number]        Your Vitals Were     Pulse Temperature Height Pulse Oximetry BMI (Body Mass Index)       60 98.4  F (36.9  C) (Oral) 1.803 m (5' 11\") 99% 26.36 kg/m2        Blood Pressure from Last 3 Encounters:   10/09/18 120/72   08/24/18 127/79   07/27/18 118/70    Weight from Last 3 Encounters:   10/09/18 85.7 kg (189 lb)   07/27/18 84.6 kg (186 lb 9.6 oz)   03/22/18 84.6 kg (186 lb 8 oz)              We Performed the Following     MENTAL HEALTH REFERRAL  - Adult; Outpatient Treatment; Individual/Couples/Family/Group Therapy/Health Psychology; [facility] [facility] [phone]; We will contact you to schedule the appointment or please call with any questions          Today's Medication Changes          These changes are accurate as of 10/9/18 11:49 AM.  If you have any questions, ask your nurse or doctor.               Start taking these medicines.        Dose/Directions    venlafaxine 37.5 MG 24 hr capsule   Commonly known as:  EFFEXOR-XR   Used for:  Moderate episode of recurrent major depressive disorder (H)   Started by:  Jenifer Boss MD        Dose:  37.5 mg   Take 1 capsule (37.5 mg) by mouth daily   Quantity:  30 capsule   Refills:  0            Where to get your medicines      These medications were sent to [facility] - " [place], [place] - [address], [address]  [address], [address], [facility] [address]     Phone:  [phone]     venlafaxine 37.5 MG 24 hr capsule         Some of these will need a paper prescription and others can be bought over the counter.  Ask your nurse if you have questions.     Bring a paper prescription for each of these medications     LORazepam 1 MG tablet                Primary Care Provider Office Phone # Fax #    Jenifer Lavon Boss [phone] [address] [facility] [address]        Equal Access to Services     [facility]: Hadii aad ku hadasho Soomaali, waaxda luqadaha, qaybta kaalmada adeegyada, celestino arshad haylindsayn livier ibanez . So [facility] [phone].    ATENCIÓN: Si habla español, tiene a mac disposición servicios gratuitos de asistencia lingüística. Llame al [phone].    We comply with applicable federal civil rights laws and [place] laws. We do not discriminate on the basis of race, color, national origin, age, disability, sex, sexual orientation, or gender identity.            Thank you!     Thank you for choosing [facility]  for your care. Our goal is always to provide you with excellent care. Hearing back from our patients is one way we can continue to improve our services. Please take a few minutes to complete the written survey that you may receive in the mail after your visit with us. Thank you!             Your Updated Medication List - Protect others around you: Learn how to safely use, store and throw away your medicines at www.disposemymeds.org.          This list is accurate as of 10/9/18 11:49 AM.  Always use your most recent med list.                   Brand Name Dispense Instructions for use Diagnosis    buPROPion 75 MG tablet    WELLBUTRIN          LORazepam 1 MG tablet    ATIVAN    30 tablet    Take 0.5-1 tablets (0.5-1 mg) by mouth 3 times daily as needed for anxiety    Adjustment disorder with  anxiety       meloxicam 15 MG tablet    MOBIC          * PARoxetine 30 MG tablet    PAXIL    90 tablet    TAKE ONE TABLET BY MOUTH EVERY DAY WITH 20 MG TO MAKE 50 MG DAILY    Adjustment disorder with anxiety       * PARoxetine 20 MG tablet    PAXIL    90 tablet    Take 1 tablet (20 mg) by mouth daily With 30 mg to make 50 mg daily    Adjustment disorder with anxiety       traZODone 50 MG tablet    DESYREL    90 tablet    TAKE 1 TABLET (50MG) BY MOUTH NIGHTLY FOR SLEEP    Primary insomnia       venlafaxine 37.5 MG 24 hr capsule    EFFEXOR-XR    30 capsule    Take 1 capsule (37.5 mg) by mouth daily    Moderate episode of recurrent major depressive disorder (H)       vitamin B complex with vitamin C Tabs tablet      Take 1 tablet by mouth daily    Takes dietary supplements       * Notice:  This list has 2 medication(s) that are the same as other medications prescribed for you. Read the directions carefully, and ask your doctor or other care provider to review them with you.

## 2018-10-09 NOTE — PROGRESS NOTES
"  SUBJECTIVE:   Julio Oh is a 27 year old male who presents to clinic today for the following health issues:      Anxiety Follow-Up    Status since last visit: Worsened     Other associated symptoms:None    Complicating factors:   Significant life event: Yes-  Losing a friend    Current substance abuse: None  Depression symptoms: Yes-  some  OCTAVIO-7 SCORE 2/15/2016 10/31/2016 7/5/2017   Total Score - - -   Total Score 8 9 8       OCTAVIO-7    Amount of exercise or physical activity: 4-5 days/week for an average of 15-30 minutes    Problems taking medications regularly: No    Medication side effects: Possible fatigue     Diet: regular (no restrictions)    SUBJECTIVE:  Here today in follow-up of anxiety and depression.  We have been working to find the best regimen for the patient asked year or so.  Currently using 50 mg daily of Paxil and this definitely has helped but it seems not quite enough.  The patient says that he feels he is always under stress.  There have been certain events such as the recent loss of a friend but even before this the situation was there.  He says he feels that is not doing a good enough job at work and tends to take this stress home at night.  Has used the lorazepam to help cut the edge off of the anxiety but is feeling a bit more depressed.  We discussed the possibility of counseling and he thinks he would be interested in adding that as well.    Review of systems otherwise negative.  Past medical, family, and social history reviewed and updated in chart.    OBJECTIVE:  /72 (BP Location: Right arm, Patient Position: Chair, Cuff Size: Adult Regular)  Pulse 60  Temp 98.4  F (36.9  C) (Oral)  Ht 1.803 m (5' 11\")  Wt 85.7 kg (189 lb)  SpO2 99%  BMI 26.36 kg/m2  Psych: Alert and oriented times 3; coherent speech, normal   rate and volume, able to articulate logical thoughts, able   to abstract reason, no tangential thoughts, no hallucinations   or delusions  His affect is " appropriate  S1 and S2 normal, no murmurs, clicks, gallops or rubs. Regular rate and rhythm. Chest is clear; no wheezes or rales. No edema or JVD.  Past labs reviewed with the patient.     ASSESSMENT / PLAN:  (F33.1) Moderate episode of recurrent major depressive disorder (H)  (primary encounter diagnosis)  Comment: We will continue with the 50 mg daily of paroxetine and add a small touch of Effexor.  I would also like to get him set up with counseling  Plan: venlafaxine (EFFEXOR-XR) 37.5 MG 24 hr capsule,        MENTAL HEALTH REFERRAL  - Adult; Outpatient         Treatment; Individual/Couples/Family/Group         Therapy/Health Psychology; [facility]: [facility] [phone]; We will         contact you to schedule the appointment or         please call with any questions            (F43.22) Adjustment disorder with anxiety  Comment:   Plan: LORazepam (ATIVAN) 1 MG tablet, MENTAL HEALTH         REFERRAL  - Adult; Outpatient Treatment;         Individual/Couples/Family/Group Therapy/Health         Psychology; [facility]: [facility]         [phone]; We will contact you to schedule        the appointment or please call with any         questions            Follow up -asked the patient to contact me in 2-3 weeks with an update  S. Lavon Boss MD    (Chart documentation completed in part with Dragon voice-recognition software.  Even though reviewed some grammatical, spelling, and word errors may remain.)

## 2018-10-10 ASSESSMENT — PATIENT HEALTH QUESTIONNAIRE - PHQ9: SUM OF ALL RESPONSES TO PHQ QUESTIONS 1-9: 17

## 2018-10-10 ASSESSMENT — ANXIETY QUESTIONNAIRES: GAD7 TOTAL SCORE: 16

## 2018-10-29 DIAGNOSIS — F51.01 PRIMARY INSOMNIA: ICD-10-CM

## 2018-10-29 RX ORDER — TRAZODONE HYDROCHLORIDE 50 MG/1
TABLET, FILM COATED ORAL
Qty: 90 TABLET | Refills: 1 | Status: SHIPPED | OUTPATIENT
Start: 2018-10-29 | End: 2019-04-25

## 2018-10-29 NOTE — TELEPHONE ENCOUNTER
"Requested Prescriptions   Pending Prescriptions Disp Refills     traZODone (DESYREL) 50 MG tablet [Pharmacy Med Name: TRAZODONE HCL 50MG TABS] 90 tablet 1     Sig: TAKE 1 TABLET (50MG) BY MOUTH NIGHTLY FOR SLEEP    Serotonin Modulators Passed    10/29/2018  9:15 AM       Passed - Recent (12 mo) or future (30 days) visit within the authorizing provider's specialty    Patient had office visit in the last 12 months or has a visit in the next 30 days with authorizing provider or within the authorizing provider's specialty.  See \"Patient Info\" tab in inbasket, or \"Choose Columns\" in Meds & Orders section of the refill encounter.             Passed - Patient is age 18 or older        traZODone (DESYREL) 50 MG tablet  Last Written Prescription Date:  4/30/18  Last Fill Quantity: 90,  # refills: 1   Last office visit: 10/9/2018 with prescribing provider:  Dr. Boss   Future Office Visit:      "

## 2018-11-14 DIAGNOSIS — F33.1 MODERATE EPISODE OF RECURRENT MAJOR DEPRESSIVE DISORDER (H): ICD-10-CM

## 2018-11-14 RX ORDER — VENLAFAXINE HYDROCHLORIDE 37.5 MG/1
CAPSULE, EXTENDED RELEASE ORAL
Qty: 30 CAPSULE | Refills: 0 | Status: SHIPPED | OUTPATIENT
Start: 2018-11-14 | End: 2018-12-03

## 2018-11-14 NOTE — TELEPHONE ENCOUNTER
"Requested Prescriptions   Pending Prescriptions Disp Refills     venlafaxine (EFFEXOR-XR) 37.5 MG 24 hr capsule [Pharmacy Med Name: VENLAFAXINE HCL ER 37.5MG CP24] 30 capsule 0     Sig: TAKE ONE CAPSULE BY MOUTH EVERY DAY    Serotonin-Norepinephrine Reuptake Inhibitors  Failed    11/14/2018  8:38 AM       Failed - PHQ-9 score of less than 5 in past 6 months    Please review last PHQ-9 score.          Passed - Blood pressure under 140/90 in past 12 months    BP Readings from Last 3 Encounters:   10/09/18 120/72   08/24/18 127/79   07/27/18 118/70                Passed - Patient is age 18 or older       Passed - Normal serum creatinine on file in past 12 months    Recent Labs   Lab Test  07/27/18   1410   CR  1.00            Passed - Recent (6 mo) or future (30 days) visit within the authorizing provider's specialty    Patient had office visit in the last 6 months or has a visit in the next 30 days with authorizing provider or within the authorizing provider's specialty.  See \"Patient Info\" tab in inbasket, or \"Choose Columns\" in Meds & Orders section of the refill encounter.            venlafaxine (EFFEXOR-XR) 37.5 MG 24 hr capsule  Last Written Prescription Date:  10/9/18  Last Fill Quantity: 30,  # refills: 0   Last office visit: 10/9/2018 with prescribing provider:  Dr. Boss   Future Office Visit:      "

## 2018-11-20 ENCOUNTER — MYC MEDICAL ADVICE (OUTPATIENT)
Dept: FAMILY MEDICINE | Facility: CLINIC | Age: 28
End: 2018-11-20

## 2018-11-25 ENCOUNTER — E-VISIT (OUTPATIENT)
Dept: FAMILY MEDICINE | Facility: CLINIC | Age: 28
End: 2018-11-25
Payer: COMMERCIAL

## 2018-11-25 DIAGNOSIS — F41.9 ANXIETY: Primary | ICD-10-CM

## 2018-11-25 PROCEDURE — 99444 ZZC PHYSICIAN ONLINE EVALUATION & MANAGEMENT SERVICE: CPT | Performed by: FAMILY MEDICINE

## 2018-11-25 ASSESSMENT — ANXIETY QUESTIONNAIRES
2. NOT BEING ABLE TO STOP OR CONTROL WORRYING: NEARLY EVERY DAY
6. BECOMING EASILY ANNOYED OR IRRITABLE: NEARLY EVERY DAY
GAD7 TOTAL SCORE: 19
GAD7 TOTAL SCORE: 19
7. FEELING AFRAID AS IF SOMETHING AWFUL MIGHT HAPPEN: NEARLY EVERY DAY
1. FEELING NERVOUS, ANXIOUS, OR ON EDGE: NEARLY EVERY DAY
4. TROUBLE RELAXING: NEARLY EVERY DAY
3. WORRYING TOO MUCH ABOUT DIFFERENT THINGS: NEARLY EVERY DAY
GAD7 TOTAL SCORE: 19
7. FEELING AFRAID AS IF SOMETHING AWFUL MIGHT HAPPEN: NEARLY EVERY DAY
5. BEING SO RESTLESS THAT IT IS HARD TO SIT STILL: SEVERAL DAYS

## 2018-11-25 ASSESSMENT — PATIENT HEALTH QUESTIONNAIRE - PHQ9
10. IF YOU CHECKED OFF ANY PROBLEMS, HOW DIFFICULT HAVE THESE PROBLEMS MADE IT FOR YOU TO DO YOUR WORK, TAKE CARE OF THINGS AT HOME, OR GET ALONG WITH OTHER PEOPLE: VERY DIFFICULT
SUM OF ALL RESPONSES TO PHQ QUESTIONS 1-9: 20
SUM OF ALL RESPONSES TO PHQ QUESTIONS 1-9: 20

## 2018-11-26 ASSESSMENT — PATIENT HEALTH QUESTIONNAIRE - PHQ9: SUM OF ALL RESPONSES TO PHQ QUESTIONS 1-9: 20

## 2018-11-26 ASSESSMENT — ANXIETY QUESTIONNAIRES: GAD7 TOTAL SCORE: 19

## 2018-12-03 RX ORDER — BUPROPION HYDROCHLORIDE 75 MG/1
75 TABLET ORAL 2 TIMES DAILY
Qty: 60 TABLET | Refills: 0 | Status: SHIPPED | OUTPATIENT
Start: 2018-12-03 | End: 2019-01-20

## 2018-12-04 ENCOUNTER — THERAPY VISIT (OUTPATIENT)
Dept: PHYSICAL THERAPY | Facility: CLINIC | Age: 28
End: 2018-12-04
Payer: COMMERCIAL

## 2018-12-04 DIAGNOSIS — M25.511 RIGHT SHOULDER PAIN: ICD-10-CM

## 2018-12-04 PROCEDURE — 97110 THERAPEUTIC EXERCISES: CPT | Mod: GP | Performed by: PHYSICAL THERAPIST

## 2018-12-04 PROCEDURE — 97112 NEUROMUSCULAR REEDUCATION: CPT | Mod: GP | Performed by: PHYSICAL THERAPIST

## 2018-12-04 PROCEDURE — 97161 PT EVAL LOW COMPLEX 20 MIN: CPT | Mod: GP | Performed by: PHYSICAL THERAPIST

## 2018-12-04 NOTE — PROGRESS NOTES
[facility] Initial Evaluation  Subjective:  Patient is a 28 year old male presenting with rehab right shoulder hpi.   Julio Oh is a 28 year old male with a right shoulder condition.        Pt injured his R shoulder in August of 2018, pt reached across his body while lying in bed and felt a sharp pain.  Following that he felt a few occasional tweaks here and there, pt is struggling because any time he tries to be active at work or working out he aggravates his R shoulder.      Pt denies any previous R shoulder problems.     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    Patient reports pain:  In the joint and posterior.    Pain is described as sharp and is intermittent and reported as 8/10.  Associated with: clicking. Pain is the same all the time.  Symptoms are exacerbated by lying on extremity, lifting, carrying and certain positions and relieved by rest.  Since onset symptoms are unchanged (up and down).  Special tests:  MRI.      General health as reported by patient is excellent.  Pertinent medical history includes:  Depression.      Current medications:  Anti-depressants and muscle relaxants.          Barriers include:  None as reported by the patient.    Red flags:  None as reported by the patient.                        Objective:  System                   Shoulder Evaluation:  ROM:  AROM:  normal                                  Strength:    Flexion: Left:5/5   Pain:    Right: 5-/5     Pain:   Extension:  Left: 5/5    Pain:    Right: 5-/5    Pain:      Internal Rotation:  Left:5/5     Pain:    Right: 4+/5     Pain:  External Rotation:   Left:5/5     Pain:   Right:4+/5     Pain:            Stability Testing:  normal      Special Tests:  Special tests assessed shoulder: known labral tear, not tested.        Mobility Tests:  not assessed                                                 General     ROS    Assessment/Plan:    Patient is a 28 year old male with right side  shoulder complaints.    Patient has the following significant findings with corresponding treatment plan.                Diagnosis 1:  R shoulder labral tear      Pain -  hot/cold therapy, manual therapy, self management, education and home program  Decreased strength - therapeutic exercise and therapeutic activities  Impaired muscle performance - neuro re-education  Decreased function - therapeutic activities    Therapy Evaluation Codes:   1) History comprised of:   Personal factors that impact the plan of care:      None.    Comorbidity factors that impact the plan of care are:      Depression.     Medications impacting care: Anti-depressant and Muscle relaxant.  2) Examination of Body Systems comprised of:   Body structures and functions that impact the plan of care:      Shoulder.   Activity limitations that impact the plan of care are:      Lifting and reaching.  3) Clinical presentation characteristics are:   Evolving/Changing.  4) Decision-Making    Low complexity using standardized patient assessment instrument and/or measureable assessment of functional outcome.  Cumulative Therapy Evaluation is: Low complexity.    Previous and current functional limitations:  (See Goal Flow Sheet for this information)    Short term and Long term goals: (See Goal Flow Sheet for this information)     Communication ability:  Patient appears to be able to clearly communicate and understand verbal and written communication and follow directions correctly.  Treatment Explanation - The following has been discussed with the patient:   RX ordered/plan of care  Anticipated outcomes  Possible risks and side effects  This patient would benefit from PT intervention to resume normal activities.   Rehab potential is good.    Frequency:  1 X week, once daily  Duration:  for 6 weeks  Discharge Plan:  Achieve all LTG.  Independent in home treatment program.  Reach maximal therapeutic benefit.    Please refer to the daily flowsheet for  treatment today, total treatment time and time spent performing 1:1 timed codes.

## 2018-12-04 NOTE — LETTER
[facility]  [address]  [address]  [place] [address]  [phone]    2018    Re: Julio Oh   :   1990  MRN:  [id number]     REFERRING PHYSICIAN:   Froy Hahn  [facility]  Date of Initial Evaluation:  2018  Visits:  Rxs Used: 1  Reason for Referral:  Right shoulder pain    EVALUATION SUMMARY    [facility] Initial Evaluation    Subjective:  Patient is a 28 year old male presenting with rehab right shoulder hpi.   Julio Oh is a 28 year old male with a right shoulder condition.        Pt injured his R shoulder in 2018, pt reached across his body while lying in bed and felt a sharp pain.  Following that he felt a few occasional tweaks here and there, pt is struggling because any time he tries to be active at work or working out he aggravates his R shoulder.    Pt denies any previous R shoulder problems.   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    Patient reports pain:  In the joint and posterior.    Pain is described as sharp and is intermittent and reported as 8/10.  Associated with: clicking. Pain is the same all the time.  Symptoms are exacerbated by lying on extremity, lifting, carrying and certain positions and relieved by rest.  Since onset symptoms are unchanged (up and down).  Special tests:  MRI.      General health as reported by patient is excellent.  Pertinent medical history includes:  Depression.      Current medications:  Anti-depressants and muscle relaxants.        Barriers include:  None as reported by the patient.  Red flags:  None as reported by the patient.  Objective:  System  Shoulder Evaluation:  ROM:  AROM:  normal  Strength:    Flexion: Left:5/5   Pain:    Right: 5-/5     Pain:   Extension:  Left: 5/5    Pain:    Right: 5-/5    Pain:  Internal Rotation:  Left:5/5     Pain:    Right: 4+/5     Pain:  External Rotation:   Left:5/5      Pain:   Right:4+/5     Pain:    Stability Testing:  normal  Special Tests:  Special tests assessed shoulder: known labral tear, not tested.  Mobility Tests:  not assessed  Re: Julio Oh   :   1990  MRN:  [id number]    Assessment/Plan:    Patient is a 28 year old male with right side shoulder complaints.    Patient has the following significant findings with corresponding treatment plan.                Diagnosis 1:  R shoulder labral tear   Pain -  hot/cold therapy, manual therapy, self management, education and home program  Decreased strength - therapeutic exercise and therapeutic activities  Impaired muscle performance - neuro re-education  Decreased function - therapeutic activities  Therapy Evaluation Codes:   1) History comprised of:   Personal factors that impact the plan of care:      None.    Comorbidity factors that impact the plan of care are:      Depression.     Medications impacting care: Anti-depressant and Muscle relaxant.  2) Examination of Body Systems comprised of:   Body structures and functions that impact the plan of care:      Shoulder.   Activity limitations that impact the plan of care are:      Lifting and reaching.  3) Clinical presentation characteristics are:   Evolving/Changing.  4) Decision-Making    Low complexity using standardized patient assessment instrument and/or measureable assessment of functional outcome.  Cumulative Therapy Evaluation is: Low complexity.  Previous and current functional limitations:  (See Goal Flow Sheet for this information)    Short term and Long term goals: (See Goal Flow Sheet for this information)   Communication ability:  Patient appears to be able to clearly communicate and understand verbal and written communication and follow directions correctly.  Treatment Explanation - The following has been discussed with the patient:   RX ordered/plan of care  Anticipated outcomes  Possible risks and side effects  This patient would benefit from PT  intervention to resume normal activities.   Rehab potential is good.  Frequency:  1 X week, once daily  Duration:  for 6 weeks  Discharge Plan:  Achieve all LTG.  Independent in home treatment program.  Reach maximal therapeutic benefit.      Thank you for your referral.            Re: Julio Oh   :   1990  MRN:  [id number]        INQUIRIES  Therapist: Med Juan DPT  [facility] [address] [address]  Phone: [phone]  Fax: [phone]

## 2018-12-19 ENCOUNTER — THERAPY VISIT (OUTPATIENT)
Dept: PHYSICAL THERAPY | Facility: CLINIC | Age: 28
End: 2018-12-19
Payer: COMMERCIAL

## 2018-12-19 DIAGNOSIS — M25.511 RIGHT SHOULDER PAIN: ICD-10-CM

## 2018-12-19 DIAGNOSIS — M25.561 ACUTE PAIN OF RIGHT KNEE: Primary | ICD-10-CM

## 2018-12-19 PROCEDURE — 97161 PT EVAL LOW COMPLEX 20 MIN: CPT | Mod: GP | Performed by: PHYSICAL THERAPIST

## 2018-12-19 PROCEDURE — 97110 THERAPEUTIC EXERCISES: CPT | Mod: GP | Performed by: PHYSICAL THERAPIST

## 2018-12-19 PROCEDURE — 97112 NEUROMUSCULAR REEDUCATION: CPT | Mod: GP | Performed by: PHYSICAL THERAPIST

## 2018-12-19 NOTE — LETTER
[place] [facility]  [address]  [address]  [place] [facility] [place] [address]  [phone]    2018    Re: Julio Oh   :   1990  MRN:  [id number]   REFERRING PHYSICIAN:   Floyd Leon  [place] [facility]  Date of Initial Evaluation:  2018  Visits:  Rxs Used: 2  Reason for Referral:     Right shoulder pain  Acute pain of right knee    EVALUATION SUMMARY    [place] [facility] Initial Evaluation  Subjective:  Pt injured his R MCL while doing karate on or around 2018, pt's leg was planted someone had it pinned down and his body moved past it.  Pt had an MRI which showed a LCL tear and partial MCL tear.  Pt had previously partially tore his R MCL around 1 -1.5 years ago.  Pt had some issues since that first injury, but was doing well until this most recent event.  Julio Oh is a 28 year old male with a right knee condition.      This is a new condition     Pain is described as aching and is constant and reported as 6/10.  Associated symptoms:  Edema, loss of motion/stiffness and loss of strength. Pain is worse in the A.M..  Symptoms are exacerbated by bending/squatting, kneeling and walking and relieved by rest and ice.  Since onset symptoms are gradually improving.  Special tests:  MRI. General health as reported by patient is good.            Patient is currently not working due to present treatment problem.    Barriers include:  None as reported by the patient.  Red flags:  None as reported by the patient.  Objective:  Gait:    Gait Type:  Antalgic     Hip Evaluation  Hip Strength:    Extension:  Left: 5-/5  Pain:Right: 5-/5    Pain:    Abduction:  Left: 5-/5     Pain:Right: /5 +/-   Pain:  Knee Evaluation:  ROM:    AROM  Hyperextension:  Left:  3    Right: 0  Extension:  Left: 0    Right:  0  Flexion: Left: 145    Right: 141  Strength:   Extension:  Left: 5/5   Pain:      Right: 5-/5   Pain:  Quad Set Left: Good     Pain:   Re: Julio Oh   :   1990    Quad Set Right: Fair    Pain:  Ligament Testing:  Not Assessed (MRI shows LCL tear and partial MCL tear)  Edema:  Edema of the knee: mild.  Functional Testing:    Quad:    Single Leg Squat:  Left:       Right:      Not tested   Bilateral Leg Squat:  Good     Assessment/Plan:    Patient is a 28 year old male with right side knee complaints.    Patient has the following significant findings with corresponding treatment plan.                Diagnosis 1:  R LCL tear, MCL sprain  Pain -  hot/cold therapy, manual therapy, self management, education, directional preference exercise and home program  Decreased ROM/flexibility - manual therapy and therapeutic exercise  Decreased strength - therapeutic exercise and therapeutic activities  Impaired gait - gait training  Impaired muscle performance - neuro re-education  Decreased function - therapeutic activities  Therapy Evaluation Codes:   1) History comprised of:   Personal factors that impact the plan of care:      None.    Comorbidity factors that impact the plan of care are:      None.     Medications impacting care: see chart.  2) Examination of Body Systems comprised of:   Body structures and functions that impact the plan of care:      Knee.   Activity limitations that impact the plan of care are:      Jumping, Sports, Squatting/kneeling, Stairs and Walking.  3) Clinical presentation characteristics are:   Evolving/Changing.  4) Decision-Making    Low complexity using standardized patient assessment instrument and/or measureable assessment of functional outcome.  Cumulative Therapy Evaluation is: Low complexity.    Previous and current functional limitations:  (See Goal Flow Sheet for this information)    Short term and Long term goals: (See Goal Flow Sheet for this information)   Communication ability:  Patient appears to be able to clearly communicate and understand verbal and written communication and follow  directions correctly.  Treatment Explanation - The following has been discussed with the patient:   RX ordered/plan of care  Anticipated outcomes  Possible risks and side effects  This patient would benefit from PT intervention to resume normal activities.   Rehab potential is good.  Frequency:  1 X week, once daily  Re: Julio Oh   :   1990    Duration:  for 6 weeks  Discharge Plan:  Achieve all LTG.  Independent in home treatment program.  Reach maximal therapeutic benefit.    Thank you for your referral.    INQUIRIES  Therapist: Patrick Juan PT   [facility]  [address] [place] [id number]  Phone: [phone]  Fax: [phone]

## 2018-12-19 NOTE — PROGRESS NOTES
[place] [facility] Initial Evaluation  Subjective:  Pt injured his R MCL while doing karate on or around 12/4/2018, pt's leg was planted someone had it pinned down and his body moved past it.  Pt had an MRI which showed a LCL tear and partial MCL tear.  Pt had previously partially tore his R MCL around 1 -1.5 years ago.  Pt had some issues since that first injury, but was doing well until this most recent event.        Julio Oh is a 28 year old male with a right knee condition.      This is a new condition         Pain is described as aching and is constant and reported as 6/10.  Associated symptoms:  Edema, loss of motion/stiffness and loss of strength. Pain is worse in the A.M..  Symptoms are exacerbated by bending/squatting, kneeling and walking and relieved by rest and ice.  Since onset symptoms are gradually improving.  Special tests:  MRI.      General health as reported by patient is good.            Patient is currently not working due to present treatment problem.      Barriers include:  None as reported by the patient.    Red flags:  None as reported by the patient.                        Objective:    Gait:    Gait Type:  Antalgic                                                      Hip Evaluation    Hip Strength:      Extension:  Left: 5-/5  Pain:Right: 5-/5    Pain:    Abduction:  Left: 5-/5     Pain:Right: /5 +/-   Pain:                           Knee Evaluation:  ROM:    AROM    Hyperextension:  Left:  3    Right: 0  Extension:  Left: 0    Right:  0  Flexion: Left: 145    Right: 141        Strength:     Extension:  Left: 5/5   Pain:      Right: 5-/5   Pain:    Quad Set Left: Good    Pain:   Quad Set Right: Fair    Pain:  Ligament Testing:  Not Assessed (MRI shows LCL tear and partial MCL tear)                    Edema:  Edema of the knee: mild.      Functional Testing:          Quad:    Single Leg Squat:  Left:       Right:      Not tested   Bilateral Leg Squat:  Good                General     ROS    Assessment/Plan:    Patient is a 28 year old male with right side knee complaints.    Patient has the following significant findings with corresponding treatment plan.                Diagnosis 1:  R LCL tear, MCL sprain      Pain -  hot/cold therapy, manual therapy, self management, education, directional preference exercise and home program  Decreased ROM/flexibility - manual therapy and therapeutic exercise  Decreased strength - therapeutic exercise and therapeutic activities  Impaired gait - gait training  Impaired muscle performance - neuro re-education  Decreased function - therapeutic activities    Therapy Evaluation Codes:   1) History comprised of:   Personal factors that impact the plan of care:      None.    Comorbidity factors that impact the plan of care are:      None.     Medications impacting care: see chart.  2) Examination of Body Systems comprised of:   Body structures and functions that impact the plan of care:      Knee.   Activity limitations that impact the plan of care are:      Jumping, Sports, Squatting/kneeling, Stairs and Walking.  3) Clinical presentation characteristics are:   Evolving/Changing.  4) Decision-Making    Low complexity using standardized patient assessment instrument and/or measureable assessment of functional outcome.  Cumulative Therapy Evaluation is: Low complexity.    Previous and current functional limitations:  (See Goal Flow Sheet for this information)    Short term and Long term goals: (See Goal Flow Sheet for this information)     Communication ability:  Patient appears to be able to clearly communicate and understand verbal and written communication and follow directions correctly.  Treatment Explanation - The following has been discussed with the patient:   RX ordered/plan of care  Anticipated outcomes  Possible risks and side effects  This patient would benefit from PT intervention to resume normal activities.   Rehab potential is  good.    Frequency:  1 X week, once daily  Duration:  for 6 weeks  Discharge Plan:  Achieve all LTG.  Independent in home treatment program.  Reach maximal therapeutic benefit.    Please refer to the daily flowsheet for treatment today, total treatment time and time spent performing 1:1 timed codes.

## 2018-12-26 DIAGNOSIS — F43.22 ADJUSTMENT DISORDER WITH ANXIETY: ICD-10-CM

## 2018-12-26 RX ORDER — PAROXETINE 30 MG/1
TABLET, FILM COATED ORAL
Qty: 90 TABLET | Refills: 1 | Status: SHIPPED | OUTPATIENT
Start: 2018-12-26 | End: 2019-06-24

## 2018-12-26 NOTE — TELEPHONE ENCOUNTER
Hello,  last fill date:11-  Last quantity:90    Thank You,  Barbara Cazares  Pharmacy Technician  [facility]  [phone]

## 2019-01-15 ENCOUNTER — THERAPY VISIT (OUTPATIENT)
Dept: PHYSICAL THERAPY | Facility: CLINIC | Age: 29
End: 2019-01-15
Payer: COMMERCIAL

## 2019-01-15 DIAGNOSIS — M25.561 ACUTE PAIN OF RIGHT KNEE: ICD-10-CM

## 2019-01-15 DIAGNOSIS — M25.511 RIGHT SHOULDER PAIN: ICD-10-CM

## 2019-01-15 PROCEDURE — 97112 NEUROMUSCULAR REEDUCATION: CPT | Mod: GP | Performed by: PHYSICAL THERAPIST

## 2019-01-15 PROCEDURE — 97110 THERAPEUTIC EXERCISES: CPT | Mod: GP | Performed by: PHYSICAL THERAPIST

## 2019-01-17 DIAGNOSIS — F41.9 ANXIETY: ICD-10-CM

## 2019-01-17 NOTE — TELEPHONE ENCOUNTER
"Requested Prescriptions   Pending Prescriptions Disp Refills     buPROPion (WELLBUTRIN) 75 MG tablet 60 tablet 0     Sig: Take 1 tablet (75 mg) by mouth 2 times daily    SSRIs Protocol Passed - 1/17/2019 11:53 AM       Passed - Recent (12 mo) or future (30 days) visit within the authorizing provider's specialty    Patient had office visit in the last 12 months or has a visit in the next 30 days with authorizing provider or within the authorizing provider's specialty.  See \"Patient Info\" tab in inbasket, or \"Choose Columns\" in Meds & Orders section of the refill encounter.             Passed - Medication is Bupropion    If the medication is Bupropion (Wellbutrin), and the patient is taking for smoking cessation; OK to refill.         Passed - Medication is active on med list       Passed - Patient is age 18 or older        buPROPion (WELLBUTRIN) 75 MG tablet  Last Written Prescription Date:  12/3/18  Last Fill Quantity: 60,  # refills: 0   Last office visit: 10/9/2018 with prescribing provider:  Lulu Hernandez   Future Office Visit:   Next 5 appointments (look out 90 days)    Feb 13, 2019  2:30 PM CST  Return Visit with Eduardo Ramirez  [facility] ([facility] [address] [place] [address] [address] [place] [address] [phone]           PHQ-9 score:    PHQ-9 SCORE 11/25/2018   PHQ-9 Total Score -   PHQ-9 Total Score MyChart 20 (Severe depression)   PHQ-9 Total Score 20         OCTAVIO-7 SCORE 7/5/2017 10/9/2018 11/25/2018   Total Score - - -   Total Score - - 19 (severe anxiety)   Total Score 8 16 19         "

## 2019-01-18 NOTE — TELEPHONE ENCOUNTER
Medication denied patient has requested medication to soon- recent dosage adjustments  Sarah Alanis RN

## 2019-01-20 RX ORDER — BUPROPION HYDROCHLORIDE 75 MG/1
75 TABLET ORAL 2 TIMES DAILY
Qty: 60 TABLET | Refills: 2 | Status: SHIPPED | OUTPATIENT
Start: 2019-01-20 | End: 2019-04-25

## 2019-01-21 ENCOUNTER — OFFICE VISIT (OUTPATIENT)
Dept: PSYCHOLOGY | Facility: CLINIC | Age: 29
End: 2019-01-21
Payer: COMMERCIAL

## 2019-01-21 DIAGNOSIS — F43.23 ADJUSTMENT DISORDER WITH MIXED ANXIETY AND DEPRESSED MOOD: Primary | ICD-10-CM

## 2019-01-21 PROCEDURE — 90791 PSYCH DIAGNOSTIC EVALUATION: CPT | Performed by: MARRIAGE & FAMILY THERAPIST

## 2019-01-21 ASSESSMENT — ANXIETY QUESTIONNAIRES
IF YOU CHECKED OFF ANY PROBLEMS ON THIS QUESTIONNAIRE, HOW DIFFICULT HAVE THESE PROBLEMS MADE IT FOR YOU TO DO YOUR WORK, TAKE CARE OF THINGS AT HOME, OR GET ALONG WITH OTHER PEOPLE: VERY DIFFICULT
1. FEELING NERVOUS, ANXIOUS, OR ON EDGE: MORE THAN HALF THE DAYS
7. FEELING AFRAID AS IF SOMETHING AWFUL MIGHT HAPPEN: SEVERAL DAYS
6. BECOMING EASILY ANNOYED OR IRRITABLE: SEVERAL DAYS
3. WORRYING TOO MUCH ABOUT DIFFERENT THINGS: MORE THAN HALF THE DAYS
2. NOT BEING ABLE TO STOP OR CONTROL WORRYING: MORE THAN HALF THE DAYS
GAD7 TOTAL SCORE: 11
5. BEING SO RESTLESS THAT IT IS HARD TO SIT STILL: SEVERAL DAYS

## 2019-01-21 ASSESSMENT — PATIENT HEALTH QUESTIONNAIRE - PHQ9
SUM OF ALL RESPONSES TO PHQ QUESTIONS 1-9: 11
5. POOR APPETITE OR OVEREATING: MORE THAN HALF THE DAYS

## 2019-01-21 NOTE — PROGRESS NOTES
Progress Note - Initial Session    Client Name:  Julio Oh Date: January 21, 2019       Service Type: Individual      Session Start Time: 2:00  Session End Time: 2:45      Session Length: 38 - 52      Session #: 45 min     Attendees: Client attended alone         Diagnostic Assessment in progress.  Unable to complete documentation at the conclusion of the first session due to gathering extensive information regarding symptom presentation, history, and impact on functioning. Client has a history of anxiety and depression symptoms. No current risk issues reported.      Mental Status Assessment:  Appearance:   Appropriate   Eye Contact:   Good   Psychomotor Behavior: Normal   Attitude:   Cooperative   Orientation:   All  Speech   Rate / Production: Normal    Volume:  Normal   Mood:    Depressed   Affect:    Appropriate   Thought Content:  Clear   Thought Form:  Coherent  Logical   Insight:    Good       Safety Issues and Plan for Safety and Risk Management:  Client denies current fears or concerns for personal safety.  Client denies current or recent suicidal ideation or behaviors.  Client denies current or recent homicidal ideation or behaviors.  Client denies current or recent self injurious behavior or ideation.  Client denies other safety concerns.  A safety and risk management plan has not been developed at this time, however client was given the after-hours number / 911 should there be a change in any of these risk factors.  Client reports there are firearms in the house. The firearms are secured in a locked space.      Diagnostic Criteria:  A. The development of emotional or behavioral symptoms in response to an identifiable stressor(s) occurring within 3 months of the onset of the stressor(s)  B. These symptoms or behaviors are clinically significant, as evidenced by one or both of the following:       - Marked distress that is out of proportion to the severity/intensity of the stressor  (with consideration for external context & culture)  C. The stress-related disturbance does not meet criteria for another disorder & is not not an exacerbation of another mental disorder  D. The symptoms do not represent normal bereavement  E. Once the stressor or its consequences have terminated, the symptoms do not persist for more than an additional 6 months       * Adjustment Disorder with Mixed Anxiety and Depressed Mood: The predominant manifestation is a combination of depression and anxiety        DSM5 Diagnoses: (Sustained by DSM5 Criteria Listed Above)  Diagnoses: Adjustment Disorders  309.28 (F43.23) With mixed anxiety and depressed mood  Psychosocial & Contextual Factors: unresolved grief, multiple injuries from martial arts, alcoholic father  WHODAS 2.0 (12 item)            This questionnaire asks about difficulties due to health conditions. Health conditions  include  disease or illnesses, other health problems that may be short or long lasting,  injuries, mental health or emotional problems, and problems with alcohol or drugs.                     Think back over the past 30 days and answer these questions, thinking about how much  difficulty you had doing the following activities. For each question, please [place] only  one response.    S1 Standing for long periods such as 30 minutes? None =         1   S2 Taking care of household responsibilities? Mild =           2   S3 Learning a new task, for example, learning how to get to a new place? Mild =           2   S4 How much of a problem do you have joining community activities (for example, festivals, [employer] or other activities) in the same way as anyone else can? Moderate =   3   S5 How much have you been emotionally affected by your health problems? Moderate =   3     In the past 30 days, how much difficulty did you have in:   S6 Concentrating on doing something for ten minutes? None =         1   S7 Walking a long distance such as a kilometer (or  equivalent)? Mild =           2   S8 Washing your whole body? None =         1   S9 Getting dressed? None =         1   S10 Dealing with people you do not know? Mild =           2   S11 Maintaining a friendship? Mild =           2   S12 Your day to day work? Moderate =   3     H1 Overall, in the past 30 days, how many days were these difficulties present? Record number of days 30   H2 In the past 30 days, for how many days were you totally unable to carry out your usual activities or work because of any health condition? Record number of days  2   H3 In the past 30 days, not counting the days that you were totally unable, for how many days did you cut back or reduce your usual activities or work because of any health condition? Record number of days 5       Collateral Reports Completed:  Not Applicable      PLAN: (Homework, other):  Client will return to complete the diagnostic interview and discuss treatment options.    Salinas Ramirez MA, LMFT

## 2019-01-22 ASSESSMENT — ANXIETY QUESTIONNAIRES: GAD7 TOTAL SCORE: 11

## 2019-02-09 ENCOUNTER — MYC MEDICAL ADVICE (OUTPATIENT)
Dept: FAMILY MEDICINE | Facility: CLINIC | Age: 29
End: 2019-02-09

## 2019-02-11 ENCOUNTER — OFFICE VISIT (OUTPATIENT)
Dept: FAMILY MEDICINE | Facility: CLINIC | Age: 29
End: 2019-02-11
Payer: COMMERCIAL

## 2019-02-11 VITALS
WEIGHT: 196 LBS | SYSTOLIC BLOOD PRESSURE: 128 MMHG | OXYGEN SATURATION: 100 % | TEMPERATURE: 98.8 F | DIASTOLIC BLOOD PRESSURE: 84 MMHG | HEIGHT: 71 IN | HEART RATE: 62 BPM | BODY MASS INDEX: 27.44 KG/M2

## 2019-02-11 DIAGNOSIS — N50.89 GENITAL LESION, MALE: ICD-10-CM

## 2019-02-11 DIAGNOSIS — Z11.3 SCREEN FOR STD (SEXUALLY TRANSMITTED DISEASE): ICD-10-CM

## 2019-02-11 DIAGNOSIS — F33.1 MODERATE EPISODE OF RECURRENT MAJOR DEPRESSIVE DISORDER (H): ICD-10-CM

## 2019-02-11 DIAGNOSIS — S43.431A LABRAL TEAR OF SHOULDER, RIGHT, INITIAL ENCOUNTER: ICD-10-CM

## 2019-02-11 DIAGNOSIS — Z01.818 PREOP GENERAL PHYSICAL EXAM: Primary | ICD-10-CM

## 2019-02-11 PROCEDURE — 87529 HSV DNA AMP PROBE: CPT | Performed by: FAMILY MEDICINE

## 2019-02-11 PROCEDURE — 99214 OFFICE O/P EST MOD 30 MIN: CPT | Performed by: FAMILY MEDICINE

## 2019-02-11 PROCEDURE — 87491 CHLMYD TRACH DNA AMP PROBE: CPT | Performed by: FAMILY MEDICINE

## 2019-02-11 PROCEDURE — 87529 HSV DNA AMP PROBE: CPT | Mod: 59 | Performed by: FAMILY MEDICINE

## 2019-02-11 PROCEDURE — 87591 N.GONORRHOEAE DNA AMP PROB: CPT | Performed by: FAMILY MEDICINE

## 2019-02-11 ASSESSMENT — MIFFLIN-ST. JEOR: SCORE: 1881.18

## 2019-02-11 NOTE — PROGRESS NOTES
[address] [address]  [phone]  Dept: [phone]    PRE-OP EVALUATION:  Today's date: 2019    Julio Oh (: 1990) presents for pre-operative evaluation assessment as requested by Dr. Camilo.  He requires evaluation and anesthesia risk assessment prior to undergoing surgery/procedure for treatment of labral tear of right shoulder.    Proposed Surgery/ Procedure: Arthroscopic Right Shoulder Surgery  Date of Surgery/ Procedure: 02/15/19  Time of Surgery/ Procedure:  9:10Am  Hospital/Surgical Facility: [facility]   Fax number for surgical facility: [phone]  Primary Physician: Jenifer Boss  Type of Anesthesia Anticipated: General    Patient has a Health Care Directive or Living Will:  NO    1. NO - Do you have a history of heart attack, stroke, stent, bypass or surgery on an artery in the head, neck, heart or legs?  2. NO - Do you ever have any pain or discomfort in your chest?  3. NO - Do you have a history of  Heart Failure?  4. NO - Are you troubled by shortness of breath when: walking on the level, up a slight hill or at night?  5. NO - Do you currently have a cold, bronchitis or other respiratory infection?  6. NO - Do you have a cough, shortness of breath or wheezing?  7. NO - Do you sometimes get pains in the calves of your legs when you walk?  8. NO - Do you or anyone in your family have previous history of blood clots?  9. NO - Do you or does anyone in your family have a serious bleeding problem such as prolonged bleeding following surgeries or cuts?  10. NO - Have you ever had problems with anemia or been told to take iron pills?  11. NO - Have you had any abnormal blood loss such as black, tarry or bloody stools, or abnormal vaginal bleeding?  12. NO - Have you ever had a blood transfusion?  13. NO - Have you or any of your relatives ever had problems with anesthesia?  14. NO - Do you have  sleep apnea, excessive snoring or daytime drowsiness?  15. NO - Do you have any prosthetic heart valves?  16. NO - Do you have prosthetic joints?  17. NO - Is there any chance that you may be pregnant?      HPI:     HPI related to upcoming procedure:   Progressive right shoulder pain since August 2018 when a fairly innocuous injury (reaching across his body) occurred.  Eventually found to have superior labral tear and fraying of biceps tendons.  Plan arthroscopic repair.    See problem list for active medical problems.  Problems all longstanding and stable, except as noted/documented.  See ROS for pertinent symptoms related to these conditions.                                                                                                                                                          .  DEPRESSION - Patient has a long history of Depression of moderate severity requiring medication for control with recent symptoms being gradually improving..Current symptoms of depression include depressed mood.                                                                                                                                                                                    .    MEDICAL HISTORY:     Patient Active Problem List    Diagnosis Date Noted     Labral tear of shoulder, right, initial encounter 02/11/2019     Priority: Medium     Right shoulder pain 12/04/2018     Priority: Medium     Knee injury, left, initial encounter 03/22/2018     Priority: Medium     Acute pain of right knee 03/22/2018     Priority: Medium     Right knee pain 10/25/2017     Priority: Medium     CHL (conductive hearing loss) 06/27/2016     Priority: Medium     Tympanic membrane perforation, right 06/27/2016     Priority: Medium     CARDIOVASCULAR SCREENING; LDL GOAL LESS THAN 160 09/11/2012     Priority: Medium     Adjustment disorder with anxiety 09/01/2010     Priority: Medium     Insomnia 07/09/2010     Priority: Medium      Closed fracture of tibia 08/07/2006     Priority: Medium     Problem list name updated by automated process. Provider to review        Past Medical History:   Diagnosis Date     NO ACTIVE PROBLEMS      Past Surgical History:   Procedure Laterality Date     HC CREATE EARDRUM OPENING,GEN ANESTH      P.E. Tubes     TYMPANOPLASTY Right 7/21/2016    Procedure: TYMPANOPLASTY;  Surgeon: Andres Joyce MD;  Location: [facility] OR     Current Outpatient Medications   Medication Sig Dispense Refill     buPROPion (WELLBUTRIN) 75 MG tablet Take 1 tablet (75 mg) by mouth 2 times daily 60 tablet 2     LORazepam (ATIVAN) 1 MG tablet Take 0.5-1 tablets (0.5-1 mg) by mouth 3 times daily as needed for anxiety 30 tablet 2     PARoxetine (PAXIL) 20 MG tablet Take 1 tablet (20 mg) by mouth daily With 30 mg to make 50 mg daily 90 tablet 1     PARoxetine (PAXIL) 30 MG tablet TAKE ONE TABLET BY MOUTH EVERY DAY WITH 20 MG TO MAKE 50 MG DAILY 90 tablet 1     traZODone (DESYREL) 50 MG tablet TAKE 1 TABLET (50MG) BY MOUTH NIGHTLY FOR SLEEP 90 tablet 1     vitamin B complex with vitamin C (VITAMIN  B COMPLEX) TABS tablet Take 1 tablet by mouth daily  0     podofilox (CONDYLOX) 0.5 % external solution Apply topically 2 times daily 3 days per week (Patient not taking: Reported on 2/11/2019) 3.5 mL 2     OTC products: None, except as noted above    Allergies   Allergen Reactions     No Known Drug Allergies       Latex Allergy: NO    Social History     Tobacco Use     Smoking status: Never Smoker     Smokeless tobacco: Never Used     Tobacco comment: no nicotine exposure   Substance Use Topics     Alcohol use: No     History   Drug Use No       REVIEW OF SYSTEMS:   CONSTITUTIONAL: NEGATIVE for fever, chills, change in weight  INTEGUMENTARY/SKIN: NEGATIVE for worrisome rashes, moles or lesions  EYES: NEGATIVE for vision changes or irritation  ENT/MOUTH: NEGATIVE for ear, mouth and throat problems  RESP: NEGATIVE for significant cough or SOB  BREAST:  "NEGATIVE for masses, tenderness or discharge  CV: NEGATIVE for chest pain, palpitations or peripheral edema  GI: NEGATIVE for nausea, abdominal pain, heartburn, or change in bowel habits  : NEGATIVE for frequency, dysuria, or hematuria  MUSCULOSKELETAL: As above  NEURO: NEGATIVE for weakness, dizziness or paresthesias  ENDOCRINE: NEGATIVE for temperature intolerance, skin/hair changes  HEME: NEGATIVE for bleeding problems  PSYCHIATRIC: NEGATIVE for changes in mood or affect    EXAM:   /84 (BP Location: Right arm, Patient Position: Chair, Cuff Size: Adult Regular)   Pulse 62   Temp 98.8  F (37.1  C) (Oral)   Ht 1.803 m (5' 11\")   Wt 88.9 kg (196 lb)   SpO2 100%   BMI 27.34 kg/m      GENERAL APPEARANCE: healthy, alert and no distress     EYES: EOMI,  PERRL     HENT: ear canals and TM's normal and nose and mouth without ulcers or lesions     NECK: no adenopathy, no asymmetry, masses, or scars and thyroid normal to palpation     RESP: lungs clear to auscultation - no rales, rhonchi or wheezes     CV: regular rates and rhythm, normal S1 S2, no S3 or S4 and no murmur, click or rub     ABDOMEN:  soft, nontender, no HSM or masses and bowel sounds normal     MS: extremities normal- no gross deformities noted, no evidence of inflammation in joints, FROM in all extremities.     SKIN: no suspicious lesions or rashes     NEURO: Normal strength and tone, sensory exam grossly normal, mentation intact and speech normal     PSYCH: mentation appears normal. and affect normal/bright     LYMPHATICS: No cervical adenopathy    DIAGNOSTICS:   No labs or EKG required    Recent Labs   Lab Test 07/27/18  1410 02/19/18  1302 11/21/14  1928   HGB 14.5 14.3 14.9    224 192     --  139   POTASSIUM 4.2  --  4.3   CR 1.00  --  1.05   A1C 5.1  --   --         IMPRESSION:   Reason for surgery/procedure: Right shoulder impingement/labral tear  Diagnosis/reason for consult: Preoperative clearance    The proposed surgical " procedure is considered INTERMEDIATE risk.    REVISED CARDIAC RISK INDEX  The patient has the following serious cardiovascular risks for perioperative complications such as (MI, PE, VFib and 3  AV Block):  No serious cardiac risks  INTERPRETATION: 0 risks: Class I (very low risk - 0.4% complication rate)    The patient has the following additional risks for perioperative complications:  No identified additional risks      ICD-10-CM    1. Preop general physical exam Z01.818    2. Labral tear of shoulder, right, initial encounter S43.431A        RECOMMENDATIONS:         --Patient is to take all scheduled medications on the day of surgery.    APPROVAL GIVEN to proceed with proposed procedure, without further diagnostic evaluation       Signed Electronically by: Jenifer Boss MD    Copy of this evaluation report is provided to requesting physician.    [facility] Preop Guidelines    Revised Cardiac Risk Index

## 2019-02-13 ENCOUNTER — OFFICE VISIT (OUTPATIENT)
Dept: PSYCHOLOGY | Facility: CLINIC | Age: 29
End: 2019-02-13
Payer: COMMERCIAL

## 2019-02-13 DIAGNOSIS — F43.23 ADJUSTMENT DISORDER WITH MIXED ANXIETY AND DEPRESSED MOOD: Primary | ICD-10-CM

## 2019-02-13 LAB
C TRACH DNA SPEC QL NAA+PROBE: NEGATIVE
HSV1 DNA SPEC QL NAA+PROBE: POSITIVE
HSV2 DNA SPEC QL NAA+PROBE: NEGATIVE
N GONORRHOEA DNA SPEC QL NAA+PROBE: NEGATIVE
SPECIMEN SOURCE: ABNORMAL
SPECIMEN SOURCE: NORMAL
SPECIMEN SOURCE: NORMAL

## 2019-02-13 PROCEDURE — 90834 PSYTX W PT 45 MINUTES: CPT | Performed by: MARRIAGE & FAMILY THERAPIST

## 2019-02-13 NOTE — RESULT ENCOUNTER NOTE
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  This is typically seen as cold sores, but as we discussed everything has jumbled up over the years.  At this point there is nothing to do for it.  We have no idea if this is going to ever happen again.  There is antiviral antibiotics that can be used, and they have to be started within the first 48 hours of outbreak.  Otherwise it typically runs its course in 7-14 days.  But again, this may never break out again.  If it is happening frequently we sometimes even used suppressive treatment.  So at this point wait until it heals up and then your in the clear, and then we can see how things go over time.  Take a look at [facility] online to find out more information.  GABY Boss M.D.

## 2019-02-14 NOTE — PROGRESS NOTES
Progress Note    Client Name: Julio Oh  Date: February 13, 2019        Service Type: Individual  Video Visit: No     Session Start Time: 2:30  Session End Time: 3:15     Session Length: 45 min    Session #: 2    Attendees: Client attended alone     Treatment Plan Last Reviewed: pending  PHQ-9 / OCTAVIO-7: assessed regularly see flow sheets    DATA  Interactive Complexity: No  Crisis: No       Progress Since Last Session (Related to Symptoms / Goals / Homework):   Symptoms: No change .    Homework: Achieved / completed to satisfaction      Episode of Care Goals: Satisfactory progress - ACTION (Actively working towards change); Intervened by reinforcing change plan / affirming steps taken     Current / Ongoing Stressors and Concerns:   - Athletic injuries with joint pain (see EPIC record)   - relationship conflict   - social isolation   - job insecurity     Treatment Objective(s) Addressed in This Session:   Client interested in cbt interventions to reduce symptoms.     Intervention:   Completed diagnostic interview and discussed treatment options.         ASSESSMENT: Current Emotional / Mental Status (status of significant symptoms):   Risk status (Self / Other harm or suicidal ideation)   Client denies current fears or concerns for personal safety.   Client denies current or recent suicidal ideation or behaviors.   Client denies current or recent homicidal ideation or behaviors.   Client denies current or recent self injurious behavior or ideation.   Client denies other safety concerns.   Client Client reports there has been no change in risk factors since their last session.     Client Client reports there has been no change in protective factors since their last session.     A safety and risk management plan has not been developed at this time, however client was given the after-hours number / 911 should there be a change in any of these risk  factors.     Appearance:   Appropriate    Eye Contact:   Good    Psychomotor Behavior: Normal    Attitude:   Cooperative    Orientation:   All   Speech    Rate / Production: Normal     Volume:  Normal    Mood:    Anxious    Affect:    Appropriate    Thought Content:  Clear    Thought Form:  Coherent  Logical    Insight:    Good      Medication Review:   No changes to current psychiatric medication(s)     Medication Compliance:   Yes     Changes in Health Issues:   Yes: shoulder surgery this week, Associated Psychological Distress     Chemical Use Review:   Substance Use: Chemical use reviewed, no active concerns identified      Tobacco Use: No current tobacco use.      Diagnosis:        Adjustment Disorders  (F43.23) With mixed anxiety and depressed mood    Collateral Reports Completed:   Not Applicable    PLAN: (Client Tasks / Therapist Tasks / Other)  Client will return to finalize treatment plan and begin interventions to reduce symptoms. Client will focus on behavioral activation through self-care.    Eduardo Ramirez MA, [facility]                                                       ______________________________________________________________________

## 2019-03-12 PROBLEM — M25.561 ACUTE PAIN OF RIGHT KNEE: Status: RESOLVED | Noted: 2018-03-22 | Resolved: 2019-03-12

## 2019-03-12 PROBLEM — M25.511 RIGHT SHOULDER PAIN: Status: RESOLVED | Noted: 2018-12-04 | Resolved: 2019-03-12

## 2019-03-12 NOTE — PROGRESS NOTES
Discharge Note    Progress reporting period is from initial evaluation date (please see noted date below) to Eligio 15, 2019.  No linked episodes      Julio failed to follow up and current status is unknown.  Please see information below for last relevant information on current status.  Patient seen for 3 visits.    SUBJECTIVE  Subjective changes noted by patient:  Pt is doing well, had some R shoulder pain which has improved, feels that it is likely due to increasing weight too rapidly with scaption exercise.  .  Current pain level is 3/10.     Previous pain level was  8/10.   Changes in function:  Yes (See Goal flowsheet attached for changes in current functional level)  Adverse reaction to treatment or activity: None    OBJECTIVE  Changes noted in objective findings: R shoulder AROM wnl.  MMT  Rshoulder ER 5-/5, IR 4+/5.  R knee AROM flex 131, L knee flex 140.     ASSESSMENT/PLAN  Diagnosis: R shoulder labral tear   STG/LTGs have been met or progress has been made towards goals:  Yes, please see goal flowsheet for most current information  Assessment of Progress: current status is unknown.    Last current status: Pt is progressing as expected   Self Management Plans:  HEP  I have re-evaluated this patient and find that the nature, scope, duration and intensity of the therapy is appropriate for the medical condition of the patient.  Julio continues to require the following intervention to meet STG and LTG's:  HEP.    Recommendations:  Discharge with current home program.  Patient to follow up with MD as needed.    Please refer to the daily flowsheet for treatment today, total treatment time and time spent performing 1:1 timed codes.

## 2019-03-22 DIAGNOSIS — F43.22 ADJUSTMENT DISORDER WITH ANXIETY: ICD-10-CM

## 2019-03-22 RX ORDER — LORAZEPAM 1 MG/1
0.5-1 TABLET ORAL 3 TIMES DAILY PRN
Qty: 30 TABLET | Refills: 2 | Status: SHIPPED | OUTPATIENT
Start: 2019-03-22 | End: 2019-06-25

## 2019-03-22 RX ORDER — LORAZEPAM 1 MG/1
0.5-1 TABLET ORAL 3 TIMES DAILY PRN
Qty: 30 TABLET | Refills: 2 | Status: CANCELLED | OUTPATIENT
Start: 2019-03-22

## 2019-03-22 RX ORDER — PAROXETINE 20 MG/1
TABLET, FILM COATED ORAL
Qty: 90 TABLET | Refills: 0 | Status: SHIPPED | OUTPATIENT
Start: 2019-03-22 | End: 2019-06-25

## 2019-03-22 NOTE — TELEPHONE ENCOUNTER
Routing refill request to provider for review/approval because:  Drug not on the [facility] refill protocol     Requested Prescriptions   Pending Prescriptions Disp Refills     LORazepam (ATIVAN) 1 MG tablet 30 tablet 2     Sig: Take 0.5-1 tablets (0.5-1 mg) by mouth 3 times daily as needed for anxiety    There is no refill protocol information for this order        Constance Rowland RN

## 2019-03-22 NOTE — TELEPHONE ENCOUNTER
"Requested Prescriptions   Pending Prescriptions Disp Refills     PARoxetine (PAXIL) 20 MG tablet [Pharmacy Med Name: PAROXETINE HCL 20MG TABS]  Last Written Prescription Date:  12/26/18  Last Fill Quantity: 90 tablet,  # refills: 1   Last office visit: 2/11/2019 with prescribing provider:  Dr. Boss   Future Office Visit:     90 tablet 1     Sig: TAKE ONE TABLET BY MOUTH EVERY DAY ALONG WITH 30MG TABLETS TO MAKE 50MG DAILY    SSRIs Protocol Passed - 3/22/2019  8:40 AM       Passed - Recent (12 mo) or future (30 days) visit within the authorizing provider's specialty    Patient had office visit in the last 12 months or has a visit in the next 30 days with authorizing provider or within the authorizing provider's specialty.  See \"Patient Info\" tab in inbasket, or \"Choose Columns\" in Meds & Orders section of the refill encounter.             Passed - Medication is active on med list       Passed - Patient is age 18 or older          "

## 2019-03-22 NOTE — TELEPHONE ENCOUNTER
Hello,  last fill date:12-  Last quantity:30  For ativan    Thank You,  Barbara Cazares  Pharmacy Technician  [facility]  [phone]

## 2019-03-22 NOTE — TELEPHONE ENCOUNTER
LORazepam (ATIVAN) 1 MG tablet rx faxed to [facility]    Kendal JANG)(KAMI)

## 2019-03-22 NOTE — TELEPHONE ENCOUNTER
LORazepam (ATIVAN) 1 MG tablet  Last Written Prescription Date:  10/09/18  Last Fill Quantity: 30,   # refills: 2  Last Office Visit: Gera  Future Office visit:       Routing refill request to provider for review/approval because:  Drug not on the [facility], [facility] or [facility] refill protocol or controlled substance

## 2019-04-25 DIAGNOSIS — F51.01 PRIMARY INSOMNIA: ICD-10-CM

## 2019-04-25 DIAGNOSIS — F41.9 ANXIETY: ICD-10-CM

## 2019-04-25 RX ORDER — TRAZODONE HYDROCHLORIDE 50 MG/1
TABLET, FILM COATED ORAL
Qty: 90 TABLET | Refills: 2 | Status: SHIPPED | OUTPATIENT
Start: 2019-04-25 | End: 2020-01-31

## 2019-04-25 RX ORDER — BUPROPION HYDROCHLORIDE 75 MG/1
75 TABLET ORAL 2 TIMES DAILY
Qty: 60 TABLET | Refills: 8 | Status: SHIPPED | OUTPATIENT
Start: 2019-04-25 | End: 2019-08-27

## 2019-04-25 NOTE — TELEPHONE ENCOUNTER
Prescription approved per [facility] Refill Protocol.      Amairani Sepulveda RN, BSN

## 2019-04-25 NOTE — TELEPHONE ENCOUNTER
Hello,  last fill date:03-  Last quantity:30 days    Thank You,  Barbara Cazares  Pharmacy Technician  [facility]  [phone]

## 2019-04-25 NOTE — TELEPHONE ENCOUNTER
"Requested Prescriptions   Pending Prescriptions Disp Refills     traZODone (DESYREL) 50 MG tablet [Pharmacy Med Name: TRAZODONE HCL 50MG TABS] 90 tablet 1     Sig: TAKE 1 TABLET (50MG) BY MOUTH NIGHTLY FOR SLEEP       Serotonin Modulators Passed - 4/25/2019  8:27 AM        Passed - Recent (12 mo) or future (30 days) visit within the authorizing provider's specialty     Patient had office visit in the last 12 months or has a visit in the next 30 days with authorizing provider or within the authorizing provider's specialty.  See \"Patient Info\" tab in inbasket, or \"Choose Columns\" in Meds & Orders section of the refill encounter.              Passed - Medication is active on med list        Passed - Patient is age 18 or older        traZODone (DESYREL) 50 MG tablet  Last Written Prescription Date:  10/29/18  Last Fill Quantity: 90,  # refills: 1   Last office visit: 2/11/2019 with prescribing provider:  Dr. Boss   Future Office Visit:      "

## 2019-05-20 ENCOUNTER — TELEPHONE (OUTPATIENT)
Dept: FAMILY MEDICINE | Facility: CLINIC | Age: 29
End: 2019-05-20

## 2019-05-20 NOTE — TELEPHONE ENCOUNTER
Panel Management Review   One phone call and send letter if unable to reach them or [employer] message and send letter if not read after 2 weeks (You will get a message to your inbasket)      BP Readings from Last 1 Encounters:   02/11/19 128/84    ,   Lab Results   Component Value Date    A1C 5.1 07/27/2018   , 2/11/2019    Health Maintenance Due   Topic Date Due     DEPRESSION ACTION PLAN  1990     PREVENTIVE CARE VISIT  04/16/2004        Fail List measure:     Depression / Dysthymia review    Measure:  Needs PHQ-9 score of 4 or less during index window.  Administer PHQ-9 and if score is 5 or more, send encounter to provider for next steps.    5 - 7 month window range: 2/9-6/9    PHQ-9 SCORE 10/9/2018 11/25/2018 1/21/2019   PHQ-9 Total Score - - -   PHQ-9 Total Score MyChart - 20 (Severe depression) -   PHQ-9 Total Score 17 20 11       If PHQ-9 recheck is 5 or more, route to provider for next steps.    Patient is due for:  PHQ9        Patient is due/failing the following:   PHQ9    Action needed:   Patient needs to do PHQ9.    Type of outreach:    Phone, left message for patient to call back.  my chart message sent    Questions for provider review:    None                                                                                       Chart routed to Care Team .                                            [facility]

## 2019-05-20 NOTE — TELEPHONE ENCOUNTER
Patient returned call - states not right now, started a new job    Best number to reach caller: Cell number on file:    Telephone Information:   Mobile [phone]       Is it ok to leave a detailed message: YES

## 2019-06-04 ENCOUNTER — FCC EXTENDED DOCUMENTATION (OUTPATIENT)
Dept: PSYCHOLOGY | Facility: CLINIC | Age: 29
End: 2019-06-04

## 2019-06-05 ENCOUNTER — FCC EXTENDED DOCUMENTATION (OUTPATIENT)
Dept: PSYCHOLOGY | Facility: CLINIC | Age: 29
End: 2019-06-05

## 2019-06-05 NOTE — PROGRESS NOTES
Adult Intake Structured Interview  Standard Diagnostic Assessment      CLIENT'S NAME: Julio Oh  MRN:   [id number]  :   1990  ACCT. NUMBER: [id number]  DATE OF SERVICE: 19  VIDEO VISIT: No    Identifying Information:  Client is a 28 year old, , partnered / significant other male. Client was referred for counseling by Dr. Boss at [facility]. Client is currently employed full time. Client attended the session alone.       Client's Statement of Presenting Concern:  Client reports the reason for seeking therapy at this time as bouts of anxiety and depression.  Client stated that his symptoms have resulted in the following functional impairments: relationship(s) and self-care      History of Presenting Concern:  Client reports that these problem(s) began many years ago. Client has attempted to resolve these concerns in the past through medication. Client reports that other professional(s) are not involved in providing support / services.       Social History:  Client reported he grew up in [place], [place]. They were the second born of 2 children. This is an intact family and parents remain . Client reported that his childhood was good until Dad developed alcohol problem when client in middle school. Client described his current relationships with family of origin as very close.    Client reported a history of 1 committed relationship. Client has been partnered / significant other for 1 year. Client reported having no children. Client identified some stable and meaningful social connections. Client reported that he has not been involved with the legal system.  Client's highest education level was college graduate. Client did not identify any learning problems. There are no ethnic, cultural or Mormon  factors that may be relevant for therapy. Client identified his preferred language to be English. Client reported he does not need the assistance of an  or other support involved in therapy. Modifications will not be used to assist communication in therapy. Client did not serve in the .     Client reports family history includes Cancer - colorectal in his maternal grandfather; Diabetes in his maternal grandmother; Eye Disorder in his maternal grandfather; Heart Disease in his paternal grandfather and paternal grandmother; Prostate Cancer in his maternal grandfather.    Mental Health History:  Client reported the following biological family members or relatives with mental health issues: Father experienced Depression.  Client previously received the following mental health diagnosis: Depression.  Client has received the following mental health services in the past: medication(s) from physician / PCP.  Hospitalizations: None.  Client is currently receiving the following services: medication(s) from physician / PCP.      Chemical Health History:  Client reported the following biological family members or relatives with chemical health issues: Father reportedly used alcohol . Client has not received chemical dependency treatment in the past. Client is not currently receiving any chemical dependency treatment. Client reports no problems as a result of their drinking / drug use.      Client Reports:  Client reports using alcohol 2 times per month and has 2 beers at a time. Client first started drinking at age 21.  Client denies using tobacco.  Client denies using marijuana.  Client reports using caffeine 2 times per day and drinks 200 mg at a time. Client started using caffeine at age ?.  Client denies using street drugs.  Client denies the non-medical use of prescription or over the counter drugs.    CAGE: G     Patient felt bad or GUILTY about their drinking (or drug use).   Based on the positive  Cage-Aid score and clinical interview there  are not indications of drug or alcohol abuse.    Discussed the general effects of drugs and alcohol on health and well-being. Therapist gave client printed information about the effects of chemical use on his health and well being.      Significant Losses / Trauma / Abuse / Neglect Issues:  There are indications or report of significant loss, trauma, abuse or neglect issues related to: death of best friend, major medical problems from injuries and divorce / relational changes when girlfriend cheated on him.    Issues of possible neglect are not present.      Medical Issues:  Client has had a physical exam to rule out medical causes for current symptoms. Date of last physical exam was within the past year. Client was encouraged to follow up with PCP if symptoms were to develop. The client has a [facility] Provider, who is named Jenifer Boss.. The client reports not having a psychiatrist. Client reports the following current medical concerns: injuires to joints. The client reports the presence of chronic or episodic pain in the form of joint pain. The pain level is moderate and has a frequency of daily.. There are not significant nutritional concerns.     Client reports current meds as:   Current Outpatient Medications   Medication Sig     buPROPion (WELLBUTRIN) 75 MG tablet Take 1 tablet (75 mg) by mouth 2 times daily     LORazepam (ATIVAN) 1 MG tablet Take 0.5-1 tablets (0.5-1 mg) by mouth 3 times daily as needed for anxiety     PARoxetine (PAXIL) 20 MG tablet TAKE ONE TABLET BY MOUTH EVERY DAY ALONG WITH 30MG TABLETS TO MAKE 50MG DAILY     PARoxetine (PAXIL) 30 MG tablet TAKE ONE TABLET BY MOUTH EVERY DAY WITH 20 MG TO MAKE 50 MG DAILY     podofilox (CONDYLOX) 0.5 % external solution Apply topically 2 times daily 3 days per week (Patient not taking: Reported on 2/11/2019)     traZODone (DESYREL) 50 MG tablet TAKE 1 TABLET (50MG) BY MOUTH NIGHTLY FOR  SLEEP     vitamin B complex with vitamin C (VITAMIN  B COMPLEX) TABS tablet Take 1 tablet by mouth daily     No current facility-administered medications for this visit.        Client Allergies:  Allergies   Allergen Reactions     No Known Drug Allergies          Medical History:  Past Medical History:   Diagnosis Date     NO ACTIVE PROBLEMS          Medication Adherence:  Client reports taking prescribed medications as prescribed.    Client was provided recommendation to follow-up with prescribing physician.    Mental Status Assessment:  Appearance:   Appropriate   Eye Contact:   Good   Psychomotor Behavior: Normal   Attitude:   Cooperative   Orientation:   All  Speech   Rate / Production: Normal    Volume:  Normal   Mood:    Anxious   Affect:    Appropriate   Thought Content:  Clear   Thought Form:  Coherent  Logical   Insight:    Good       Review of Symptoms:  Depression: Sleep Interest Guilt Energy Worthless  Jessica:  No symptoms  Psychosis: No symptoms  Anxiety: Worries  Panic:  No symptoms  Post Traumatic Stress Disorder: No symptoms  Obsessive Compulsive Disorder: No symptoms  Eating Disorder: No symptoms  Oppositional Defiant Disorder: No symptoms  ADD / ADHD: No symptoms  Conduct Disorder: No symptoms      Safety Assessment:    History of Safety Concerns:   Client denied a history of suicidal ideation.    Client denied a history of suicide attempts.    Client denied a history of homicidal ideation.    Client denied a history of self-injurious ideation and behaviors.    Client denied a history of personal safety concerns.    Client denied a history of assaultive behaviors.        Current Safety Concerns:  Client denies current suicidal ideation.    Client denies current homicidal ideation and behaviors.  Client denies current self-injurious ideation and behaviors.    Client denies current concerns for personal safety.    Client reports the following protective factors: forward/future oriented thinking,  dedication to family/friends and safe and stable environment    Client reports there are firearms in the house. The firearms are secured in a locked space.     Plan for Safety and Risk Management:  A safety and risk management plan has not been developed at this time, however client was given the after-hours number / 911 should there be a change in any of these risk factors.    Client's Strengths and Limitations:  Client identified the following strengths or resources that will help him succeed in counseling: commitment to health and well being, friends / good social support and family support. Client identified the following supports: family, Mosque / spirituality and friends. Things that may interfere with the client's success in counseling include: none.      Diagnostic Criteria:  A. The development of emotional or behavioral symptoms in response to an identifiable stressor(s) occurring within 3 months of the onset of the stressor(s)  B. These symptoms or behaviors are clinically significant, as evidenced by one or both of the following:       - Marked distress that is out of proportion to the severity/intensity of the stressor (with consideration for external context & culture)  C. The stress-related disturbance does not meet criteria for another disorder & is not not an exacerbation of another mental disorder  D. The symptoms do not represent normal bereavement  E. Once the stressor or its consequences have terminated, the symptoms do not persist for more than an additional 6 months       * Adjustment Disorder with Mixed Anxiety and Depressed Mood: The predominant manifestation is a combination of depression and anxiety      Functional Status:  Client's symptoms have caused reduced functional status in the following areas: Social / Relational - .      DSM5 Diagnoses: (Sustained by DSM5 Criteria Listed Above)  Diagnoses:  Adjustment Disorders  309.28 (F43.23) With mixed anxiety and depressed  mood  Psychosocial & Contextual Factors: unresolved grief, multiple injuries from martial arts, alcoholic father  WHODAS 2.0 (12 item); 23  Attendance Agreement:  Client has signed Attendance Agreement:Yes      Collaboration:  Collaboration with other professionals is not indicated at this time.      Preliminary Treatment Plan:  The client reports no currently identified Zoroastrian, ethnic or cultural issues relevant to therapy.     services are not indicated.    Modifications to assist communication are not indicated.    The concerns identified by the client will be addressed in therapy.    Initial Treatment will focus on: Depressed Mood - ..    As a preliminary treatment goal, client will develop more effective coping skills to manage depressive symptoms.    The focus of initial interventions will be to increase coping skills.    Referral to another professional/service is not indicated at this time..    A Release of Information is not needed at this time.    Report to child / adult protection services was NA.    Client will have access to their [facility]' medical record.      Salinas Ramirez MA, LMFT June 5, 2019

## 2019-06-05 NOTE — PROGRESS NOTES
Discharge Summary  Multiple Sessions: 2    Client Name: Julio Oh MRN#: [id number] YOB: 1990      Intake / Discharge Date: 1/21/19 - 6/5/19      DSM5 Diagnoses:   Diagnoses:  Adjustment Disorders  309.28 (F43.23) With mixed anxiety and depressed mood  Psychosocial & Contextual Factors: unresolved grief, multiple injuries from martial arts, alcoholic father  WHODAS 2.0 (12 item) Score: 27          Presenting Concern:  Depression and anxiety      Reason for Discharge:  Client did not return      Disposition at Time of Last Encounter:   Comments:    Client attended only diagnostic sessions. He made additional appointments but did not return.     Risk Management:   Client denies a history of suicidal ideation, suicide attempts, self-injurious behavior, homicidal ideation, homicidal behavior and and other safety concerns  A safety and risk management plan has not been developed at this time, however client was given the after-hours number / 911 should there be a change in any of these risk factors.      Referred To:  n/a      Salinas Ramirez MA, LMFT  6/5/2019

## 2019-06-24 DIAGNOSIS — F43.22 ADJUSTMENT DISORDER WITH ANXIETY: ICD-10-CM

## 2019-06-24 NOTE — TELEPHONE ENCOUNTER
"Requested Prescriptions   Pending Prescriptions Disp Refills     PARoxetine (PAXIL) 30 MG tablet [Pharmacy Med Name: PAROXETINE HCL 30MG TABS]  Last Written Prescription Date:  12/26/18  Last Fill Quantity: 90 tablet,  # refills: 1   Last office visit: 2/11/2019 with prescribing provider:  Dr. Boss   Future Office Visit:     90 tablet 1     Sig: TAKE ONE TABLET BY MOUTH EVERY DAY WITH 20 MG TO MAKE 50 MG DAILY       SSRIs Protocol Passed - 6/24/2019  4:47 PM        Passed - Recent (12 mo) or future (30 days) visit within the authorizing provider's specialty     Patient had office visit in the last 12 months or has a visit in the next 30 days with authorizing provider or within the authorizing provider's specialty.  See \"Patient Info\" tab in inbasket, or \"Choose Columns\" in Meds & Orders section of the refill encounter.              Passed - Medication is active on med list        Passed - Patient is age 18 or older          "

## 2019-06-25 RX ORDER — PAROXETINE 30 MG/1
TABLET, FILM COATED ORAL
Qty: 90 TABLET | Refills: 0 | Status: SHIPPED | OUTPATIENT
Start: 2019-06-25 | End: 2019-08-27

## 2019-06-25 RX ORDER — PAROXETINE 20 MG/1
TABLET, FILM COATED ORAL
Qty: 90 TABLET | Refills: 0 | Status: SHIPPED | OUTPATIENT
Start: 2019-06-25 | End: 2019-08-27

## 2019-06-25 RX ORDER — LORAZEPAM 1 MG/1
0.5-1 TABLET ORAL 3 TIMES DAILY PRN
Qty: 30 TABLET | Refills: 0 | Status: SHIPPED | OUTPATIENT
Start: 2019-06-25 | End: 2019-07-25

## 2019-07-25 ENCOUNTER — TELEPHONE (OUTPATIENT)
Dept: FAMILY MEDICINE | Facility: CLINIC | Age: 29
End: 2019-07-25

## 2019-07-25 DIAGNOSIS — F43.22 ADJUSTMENT DISORDER WITH ANXIETY: ICD-10-CM

## 2019-07-25 RX ORDER — LORAZEPAM 1 MG/1
0.5-1 TABLET ORAL 3 TIMES DAILY PRN
Qty: 30 TABLET | Refills: 0 | Status: SHIPPED | OUTPATIENT
Start: 2019-07-25 | End: 2019-08-22

## 2019-07-25 NOTE — TELEPHONE ENCOUNTER
Appears to be chronic med per past refills  [place] Prescription Monitoring Program was reviewed and confirmed past prescriptions. No variances were noted.        please fax Rx

## 2019-07-25 NOTE — TELEPHONE ENCOUNTER
Requested Prescriptions   Pending Prescriptions Disp Refills     LORazepam (ATIVAN) 1 MG tablet 30 tablet 0     Sig: Take 0.5-1 tablets (0.5-1 mg) by mouth 3 times daily as needed for anxiety       There is no refill protocol information for this order          LORazepam (ATIVAN) 1 MG tablet      Last Written Prescription Date:  6/25/19  Last Fill Quantity: 30,   # refills: 0  Last Office Visit: 2/11/19  Future Office visit:       Routing refill request to provider for review/approval because:  Drug not on the [facility] or [facility] refill protocol or controlled substance

## 2019-08-22 DIAGNOSIS — F43.22 ADJUSTMENT DISORDER WITH ANXIETY: ICD-10-CM

## 2019-08-22 RX ORDER — LORAZEPAM 1 MG/1
0.5-1 TABLET ORAL 3 TIMES DAILY PRN
Qty: 30 TABLET | Refills: 0 | Status: SHIPPED | OUTPATIENT
Start: 2019-08-22 | End: 2019-08-27

## 2019-08-22 NOTE — TELEPHONE ENCOUNTER
Requested Prescriptions   Pending Prescriptions Disp Refills     LORazepam (ATIVAN) 1 MG tablet 30 tablet 0     Sig: Take 0.5-1 tablets (0.5-1 mg) by mouth 3 times daily as needed for anxiety       There is no refill protocol information for this order          LORazepam (ATIVAN) 1 MG tablet      Last Written Prescription Date:  7/25/19  Last Fill Quantity: 30,   # refills: 0  Last Office Visit: 2/11/19  Future Office visit:       Routing refill request to provider for review/approval because:  Drug not on the [facility], [facility] or [facility] refill protocol or controlled substance

## 2019-08-22 NOTE — TELEPHONE ENCOUNTER
Routing refill request to provider for review/approval because:  Drug not on the [facility] refill protocol       Amairani Sepulveda RN, BSN, PHN

## 2019-08-27 ENCOUNTER — VIRTUAL VISIT (OUTPATIENT)
Dept: FAMILY MEDICINE | Facility: CLINIC | Age: 29
End: 2019-08-27
Payer: COMMERCIAL

## 2019-08-27 DIAGNOSIS — F43.22 ADJUSTMENT DISORDER WITH ANXIETY: ICD-10-CM

## 2019-08-27 PROCEDURE — 99441 ZZC PHYSICIAN TELEPHONE EVALUATION 5-10 MIN: CPT | Performed by: FAMILY MEDICINE

## 2019-08-27 RX ORDER — PAROXETINE 30 MG/1
TABLET, FILM COATED ORAL
Qty: 90 TABLET | Refills: 1 | Status: SHIPPED | OUTPATIENT
Start: 2019-08-27 | End: 2020-03-10

## 2019-08-27 RX ORDER — LORAZEPAM 1 MG/1
0.5-1 TABLET ORAL 3 TIMES DAILY PRN
Qty: 30 TABLET | Refills: 1 | Status: SHIPPED | OUTPATIENT
Start: 2019-08-27 | End: 2019-11-26

## 2019-08-27 RX ORDER — PAROXETINE 20 MG/1
TABLET, FILM COATED ORAL
Qty: 90 TABLET | Refills: 1 | Status: SHIPPED | OUTPATIENT
Start: 2019-08-27 | End: 2020-03-10

## 2019-08-27 ASSESSMENT — PATIENT HEALTH QUESTIONNAIRE - PHQ9
SUM OF ALL RESPONSES TO PHQ QUESTIONS 1-9: 4
5. POOR APPETITE OR OVEREATING: SEVERAL DAYS

## 2019-08-27 ASSESSMENT — ANXIETY QUESTIONNAIRES
2. NOT BEING ABLE TO STOP OR CONTROL WORRYING: NOT AT ALL
1. FEELING NERVOUS, ANXIOUS, OR ON EDGE: MORE THAN HALF THE DAYS
7. FEELING AFRAID AS IF SOMETHING AWFUL MIGHT HAPPEN: NOT AT ALL
6. BECOMING EASILY ANNOYED OR IRRITABLE: SEVERAL DAYS
3. WORRYING TOO MUCH ABOUT DIFFERENT THINGS: SEVERAL DAYS
GAD7 TOTAL SCORE: 5
5. BEING SO RESTLESS THAT IT IS HARD TO SIT STILL: NOT AT ALL

## 2019-08-27 NOTE — PROGRESS NOTES
Patient opted to conduct today's return visit via telephone vs an in person visit to the clinic.    I spoke with: Julio Oh    The reason for the telephone visit was: Follow-up with Lorazepam    Pertinent history and review of systems: Reviewed in chart    Spoke with patient via phone in follow-up of anxiety symptoms.  Happy with his current dose of Paxil 50 mg daily.  We had added some Wellbutrin earlier this year during a stressful.  The patient does not feel that he needs it and did not find much benefit.  Therefore he stopped using it.  Recently moved in with his girlfriend at her family's place and this is led to a bit of increased stress which led to the increased use of the lorazepam.  But he is only used it up to twice daily on an as-needed basis.  Does not feel that any baseline regimen change needs to be made.  Still using trazodone at bedtime without any side effects.    Assessment:   ASSESSMENT / PLAN:  (F43.22) Adjustment disorder with anxiety  Comment: Doing well on his current baseline regimen of trazodone at bedtime and Paxil during the day.  Intermittent use of as needed lorazepam.  Reminded him cautions about this medication because of its potential habit-forming nature and I think he is low risk.  Plan: PARoxetine (PAXIL) 20 MG tablet, PARoxetine         (PAXIL) 30 MG tablet, LORazepam (ATIVAN) 1 MG         tablet               Advice/instructions given to patient/guardian including prescriptions, follow up appointment or orders for diagnostic testing: As above    GABY Boss M.D.     Phone call contact time    Call Started at: [phone]    Call Ended at: [phone]

## 2019-08-28 ASSESSMENT — ANXIETY QUESTIONNAIRES: GAD7 TOTAL SCORE: 5

## 2019-09-27 ENCOUNTER — HEALTH MAINTENANCE LETTER (OUTPATIENT)
Age: 29
End: 2019-09-27

## 2019-11-14 ENCOUNTER — MYC MEDICAL ADVICE (OUTPATIENT)
Dept: FAMILY MEDICINE | Facility: CLINIC | Age: 29
End: 2019-11-14

## 2019-11-14 ENCOUNTER — OFFICE VISIT (OUTPATIENT)
Dept: URGENT CARE | Facility: RETAIL CLINIC | Age: 29
End: 2019-11-14
Payer: COMMERCIAL

## 2019-11-14 VITALS
TEMPERATURE: 98.8 F | HEART RATE: 58 BPM | DIASTOLIC BLOOD PRESSURE: 75 MMHG | OXYGEN SATURATION: 98 % | SYSTOLIC BLOOD PRESSURE: 114 MMHG

## 2019-11-14 DIAGNOSIS — J06.9 VIRAL URI WITH COUGH: Primary | ICD-10-CM

## 2019-11-14 PROCEDURE — 99213 OFFICE O/P EST LOW 20 MIN: CPT | Performed by: PHYSICIAN ASSISTANT

## 2019-11-14 RX ORDER — BUPROPION HYDROCHLORIDE 75 MG/1
TABLET ORAL
COMMUNITY
Start: 2019-04-25 | End: 2020-03-10

## 2019-11-14 RX ORDER — HYDROXYZINE PAMOATE 25 MG/1
CAPSULE ORAL
Refills: 0 | COMMUNITY
Start: 2019-02-15 | End: 2020-03-10

## 2019-11-14 RX ORDER — MELOXICAM 15 MG/1
15 TABLET ORAL
COMMUNITY
Start: 2018-08-06 | End: 2020-03-10

## 2019-11-14 RX ORDER — CYCLOBENZAPRINE HCL 10 MG
10 TABLET ORAL
COMMUNITY
Start: 2018-12-03 | End: 2020-03-10

## 2019-11-14 RX ORDER — OXYCODONE HYDROCHLORIDE 5 MG/1
TABLET ORAL
Refills: 0 | COMMUNITY
Start: 2019-02-15 | End: 2019-11-14

## 2019-11-14 RX ORDER — IBUPROFEN 800 MG/1
TABLET, FILM COATED ORAL
Refills: 0 | COMMUNITY
Start: 2019-02-15 | End: 2019-11-14

## 2019-11-14 ASSESSMENT — ENCOUNTER SYMPTOMS
DIAPHORESIS: 1
WHEEZING: 0
ADENOPATHY: 0
RHINORRHEA: 0
FATIGUE: 0
SORE THROAT: 0
CHEST TIGHTNESS: 0
FEVER: 1
HEADACHES: 0
SINUS PRESSURE: 0
SINUS PAIN: 0
SHORTNESS OF BREATH: 0
COUGH: 1
STRIDOR: 0
CHILLS: 1

## 2019-11-14 NOTE — LETTER
[facility]  [address] [address] [address]      November 14, 2019    Julio Oh  [address]  [place] [address]        To whom it may concern:    Julio was seen at our clinic today for an acute illness. Please excuse him from work missed due to this illness. He may return to work tomorrow on 11/15/19.        Sincerely,        SAMANTHA Castrejon [facility]

## 2019-11-14 NOTE — PATIENT INSTRUCTIONS
Rest! Your body needs more rest to heal.  Drink plenty of fluids (warm fluids like tea or soup are soothing and reduce cough)  Sit in the bathroom with a hot shower running and breathe in the steam.  Honey may soothe your sore throat and help manage your cough- may take straight or in warm water with lemon juice.  Delsym (dextromethorphan polistirex) is an over the counter cough medication that lasts 12 hours.   Avoid smoke (cigarettes, bonfires, fireplace, wood burning stoves).  Take Tylenol or an NSAID such as ibuprofen or naproxen as needed for pain.  Good handwashing is the best way to prevent spread of germs  Present to emergency room if you develop trouble breathing, swallowing or cough-up blood.  Follow up with your primary care provider if symptoms worsen or fail to improve as expected.

## 2019-11-14 NOTE — PROGRESS NOTES
Chief Complaint   Patient presents with     Nasal Congestion     Symptoms 3 days; is improving, needs note for work     Perspiration     Cough     Fatigue     No tylenol/ibuprofen today     Vomiting     SUBJECTIVE:  Julio Oh is a 28 year old male who presents to the clinic today with a chief complaint of cough for 3 days.  His cough is described as persistent.  The patient's symptoms are mild and moderate and improving.  Associated symptoms include fatigue, post tussive emesis, chills and sweats, nasal congestion.  The patient's symptoms are exacerbated by no particular triggers.  Patient has been using OTC cough suppressants to improve symptoms.  Predisposing factors include: None.    Past Medical History:   Diagnosis Date     NO ACTIVE PROBLEMS      buPROPion (WELLBUTRIN) 75 MG tablet,   cyclobenzaprine (FLEXERIL) 10 MG tablet, Take 10 mg by mouth  hydrOXYzine (VISTARIL) 25 MG capsule, TK 1-2 CS PO Q 4-6 H PRF MUSCLE SPASM/ PAIN  LORazepam (ATIVAN) 1 MG tablet, Take 0.5-1 tablets (0.5-1 mg) by mouth 3 times daily as needed for anxiety  meloxicam (MOBIC) 15 MG tablet, Take 15 mg by mouth  PARoxetine (PAXIL) 20 MG tablet, TAKE ONE TABLET BY MOUTH EVERY DAY ALONG WITH 30MG TABLETS TO MAKE 50MG DAILY  PARoxetine (PAXIL) 30 MG tablet, TAKE ONE TABLET BY MOUTH EVERY DAY WITH 20 MG TO MAKE 50 MG DAILY  traZODone (DESYREL) 50 MG tablet, TAKE 1 TABLET (50MG) BY MOUTH NIGHTLY FOR SLEEP  vitamin B complex with vitamin C (VITAMIN  B COMPLEX) TABS tablet, Take 1 tablet by mouth daily    No current facility-administered medications on file prior to visit.     Social History     Tobacco Use     Smoking status: Never Smoker     Smokeless tobacco: Never Used     Tobacco comment: no nicotine exposure   Substance Use Topics     Alcohol use: No     Allergies   Allergen Reactions     No Known Drug Allergies      Review of Systems   Constitutional: Positive for chills, diaphoresis and fever (subjective). Negative for fatigue.    HENT: Positive for congestion. Negative for ear pain, rhinorrhea, sinus pressure, sinus pain and sore throat.    Respiratory: Positive for cough. Negative for chest tightness, shortness of breath, wheezing and stridor.    Neurological: Negative for headaches.   Hematological: Negative for adenopathy.     OBJECTIVE:  /75 (BP Location: Left arm)   Pulse 58   Temp 98.8  F (37.1  C) (Oral)   SpO2 98%   GENERAL APPEARANCE: healthy, alert and in no distress  HEENT: PERRL, conjunctiva clear. Bilateral ear canals and TM's normal. Nose without erythematous or edematous turbinates. Posterior pharynx nonerythematous and without tonsillar hypertrophy or exudate.  NECK: supple, nontender, no lymphadenopathy  RESP: lungs clear to auscultation - no rales, rhonchi or wheezes. Breathing is comfortable, not labored and without use of accessory muscles.  CV: regular rates and rhythm, normal S1 S2, no murmur noted    ASSESSMENT:    ICD-10-CM    1. Viral URI with cough J06.9     B97.89      PLAN:   Patient Instructions   Rest! Your body needs more rest to heal.  Drink plenty of fluids (warm fluids like tea or soup are soothing and reduce cough)  Sit in the bathroom with a hot shower running and breathe in the steam.  Honey may soothe your sore throat and help manage your cough- may take straight or in warm water with lemon juice.  Delsym (dextromethorphan polistirex) is an over the counter cough medication that lasts 12 hours.   Avoid smoke (cigarettes, bonfires, fireplace, wood burning stoves).  Take Tylenol or an NSAID such as ibuprofen or naproxen as needed for pain.  Good handwashing is the best way to prevent spread of germs  Present to emergency room if you develop trouble breathing, swallowing or cough-up blood.  Follow up with your primary care provider if symptoms worsen or fail to improve as expected.    Follow up with primary care provider with any problems, questions or concerns or if symptoms worsen or fail to  improve. Patient agreed to plan and verbalized understanding.    Gaye Bird PA-C  [facility]

## 2019-11-26 DIAGNOSIS — F43.22 ADJUSTMENT DISORDER WITH ANXIETY: ICD-10-CM

## 2019-11-26 RX ORDER — LORAZEPAM 1 MG/1
TABLET ORAL
Qty: 30 TABLET | Refills: 0 | Status: SHIPPED | OUTPATIENT
Start: 2019-11-26 | End: 2020-01-31

## 2019-11-26 NOTE — TELEPHONE ENCOUNTER
Requested Prescriptions   Pending Prescriptions Disp Refills     LORazepam (ATIVAN) 1 MG tablet [Pharmacy Med Name: LORAZEPAM 1MG TABS]    Routing refill request to provider for review/approval because:  Drug not on the [facility], [facility] or [facility] refill protocol or controlled substance  Last Written Prescription Date:  8/27/19  Last Fill Quantity: 30 tablet,  # refills: 1   Last office visit: 8/27/2019 with prescribing provider:  Dr. Boss   Future Office Visit:     30 tablet 1     Sig: TAKE ONE-HALF TO ONE TABLET BY MOUTH THREE TIMES A DAY AS NEEDED FOR ANXIETY.       There is no refill protocol information for this order

## 2020-01-29 DIAGNOSIS — F43.22 ADJUSTMENT DISORDER WITH ANXIETY: ICD-10-CM

## 2020-01-29 DIAGNOSIS — F51.01 PRIMARY INSOMNIA: ICD-10-CM

## 2020-01-31 RX ORDER — LORAZEPAM 1 MG/1
TABLET ORAL
Qty: 15 TABLET | Refills: 0 | Status: SHIPPED | OUTPATIENT
Start: 2020-01-31 | End: 2020-02-07

## 2020-01-31 RX ORDER — TRAZODONE HYDROCHLORIDE 50 MG/1
50 TABLET, FILM COATED ORAL AT BEDTIME
Qty: 30 TABLET | Refills: 0 | Status: SHIPPED | OUTPATIENT
Start: 2020-01-31 | End: 2020-03-10

## 2020-01-31 NOTE — TELEPHONE ENCOUNTER
Routing refill request to provider for review/approval because:  Drug not on the [facility] refill protocol   Patient needs to be seen because it has been more than 1 year since last office visit. Last visit 10/09/2018    Yojana Jay RN  [facility]

## 2020-03-02 DIAGNOSIS — F51.01 PRIMARY INSOMNIA: ICD-10-CM

## 2020-03-02 RX ORDER — TRAZODONE HYDROCHLORIDE 50 MG/1
50 TABLET, FILM COATED ORAL AT BEDTIME
Qty: 30 TABLET | Refills: 0 | OUTPATIENT
Start: 2020-03-02

## 2020-03-02 NOTE — TELEPHONE ENCOUNTER
Hello,  last fill date:01-  Last quantity:30    Thank You,  Barbara Cazares  Pharmacy Technician  [facility]  [phone]

## 2020-03-02 NOTE — TELEPHONE ENCOUNTER
"Requested Prescriptions   Pending Prescriptions Disp Refills     traZODone (DESYREL) 50 MG tablet [Pharmacy Med Name: TRAZODONE HCL 50MG TABS] 30 tablet 0     Sig: TAKE ONE TABLET BY MOUTH EVERY NIGHT AT BEDTIME       Serotonin Modulators Passed - 3/2/2020 11:15 AM        Passed - Recent (12 mo) or future (30 days) visit within the authorizing provider's specialty     Patient has had an office visit with the authorizing provider or a provider within the authorizing providers department within the previous 12 mos or has a future within next 30 days. See \"Patient Info\" tab in inbasket, or \"Choose Columns\" in Meds & Orders section of the refill encounter.              Passed - Medication is active on med list        Passed - Patient is age 18 or older        traZODone (DESYREL) 50 MG tablet  Last Written Prescription Date:  1/31/2020  Last Fill Quantity: 30,  # refills: 0   Last office visit: 8/27/2019 with prescribing provider:  Dr. Boss   Future Office Visit:      "

## 2020-03-03 NOTE — TELEPHONE ENCOUNTER
Refill denied to the pharmacy.   If patient schedules an appointment we can provide a jazzy refill.

## 2020-03-05 RX ORDER — TRAZODONE HYDROCHLORIDE 50 MG/1
TABLET, FILM COATED ORAL
Qty: 30 TABLET | Refills: 0 | OUTPATIENT
Start: 2020-03-05

## 2020-03-05 NOTE — TELEPHONE ENCOUNTER
Routing refill request to provider for review/approval because:  Patient needs to be seen because it has been more than 1 year since last office visit.    Yojana Jay RN  [facility]

## 2020-03-10 ENCOUNTER — VIRTUAL VISIT (OUTPATIENT)
Dept: FAMILY MEDICINE | Facility: CLINIC | Age: 30
End: 2020-03-10
Payer: COMMERCIAL

## 2020-03-10 DIAGNOSIS — G47.30 SLEEP APNEA, UNSPECIFIED TYPE: ICD-10-CM

## 2020-03-10 DIAGNOSIS — F41.1 GAD (GENERALIZED ANXIETY DISORDER): ICD-10-CM

## 2020-03-10 DIAGNOSIS — F51.01 PRIMARY INSOMNIA: ICD-10-CM

## 2020-03-10 DIAGNOSIS — F33.1 MODERATE EPISODE OF RECURRENT MAJOR DEPRESSIVE DISORDER (H): Primary | ICD-10-CM

## 2020-03-10 PROCEDURE — 99441 ZZC PHYSICIAN TELEPHONE EVALUATION 5-10 MIN: CPT | Performed by: FAMILY MEDICINE

## 2020-03-10 RX ORDER — PAROXETINE 20 MG/1
TABLET, FILM COATED ORAL
Qty: 90 TABLET | Refills: 1 | Status: SHIPPED | OUTPATIENT
Start: 2020-03-10 | End: 2020-11-11

## 2020-03-10 RX ORDER — TRAZODONE HYDROCHLORIDE 50 MG/1
50 TABLET, FILM COATED ORAL AT BEDTIME
Qty: 90 TABLET | Refills: 1 | Status: SHIPPED | OUTPATIENT
Start: 2020-03-10 | End: 2020-09-11

## 2020-03-10 RX ORDER — PAROXETINE 30 MG/1
TABLET, FILM COATED ORAL
Qty: 90 TABLET | Refills: 1 | Status: SHIPPED | OUTPATIENT
Start: 2020-03-10 | End: 2020-11-11

## 2020-03-10 RX ORDER — LORAZEPAM 1 MG/1
TABLET ORAL
Qty: 15 TABLET | Refills: 2 | Status: SHIPPED | OUTPATIENT
Start: 2020-03-10 | End: 2022-10-21

## 2020-03-10 NOTE — PROGRESS NOTES
Patient opted to conduct today's return visit via telephone vs an in person visit to the clinic.    I spoke with: Tapan    The reason for the telephone visit was: Insomnia    Pertinent history and review of systems:   Spoke with patient via phone regarding depression and anxiety.  Is been on a very stable dosage of Paxil for some time with no side effects.  Desires no change.  Uses trazodone at bedtime, typically varying between 25 and 50 mg at night.  Rare intermittent use of lorazepam for breakthrough anxiety.  From the standpoint things are going well overall.  He has been noting more and more issues with stopping breathing at night.  Not sure if he snores a lot he can tell he wakes up with some frequency and others have noted that he seems to be gasping at times.  So he asks about setting up a sleep study.  We discussed what this typically entails and what the results might show.    Assessment:   ASSESSMENT / PLAN:  (F33.1) Moderate episode of recurrent major depressive disorder (H)  (primary encounter diagnosis)  Comment: Stable on current dosage.  Continue same.  Recommend follow-up in 6 months  Plan: PARoxetine (PAXIL) 20 MG tablet, PARoxetine         (PAXIL) 30 MG tablet            (F41.1) OCTAVIO (generalized anxiety disorder)  Comment: As above  Plan: PARoxetine (PAXIL) 20 MG tablet, PARoxetine         (PAXIL) 30 MG tablet, LORazepam (ATIVAN) 1 MG         tablet            (F51.01) Primary insomnia  Comment: As above  Plan: traZODone (DESYREL) 50 MG tablet            (G47.30) Sleep apnea, unspecified type  Comment: We will get him set up with a sleep study  Plan: SLEEP EVALUATION & MANAGEMENT REFERRAL - [facility]          [phone] (Age 15 and up)               Advice/instructions given to patient/guardian including prescriptions, follow up appointment or orders for diagnostic testing:     Phone call contact time    Call Started at: 1015    Call Ended at: 1022  S.  Lavon Boss M.D.

## 2020-06-19 PROBLEM — F41.1 GAD (GENERALIZED ANXIETY DISORDER): Status: ACTIVE | Noted: 2020-06-19

## 2020-06-19 PROBLEM — S43.431A LABRAL TEAR OF SHOULDER, RIGHT, INITIAL ENCOUNTER: Status: RESOLVED | Noted: 2019-02-11 | Resolved: 2020-06-19

## 2020-06-19 PROBLEM — S89.92XA KNEE INJURY, LEFT, INITIAL ENCOUNTER: Status: RESOLVED | Noted: 2018-03-22 | Resolved: 2020-06-19

## 2020-06-19 PROBLEM — F41.1 GAD (GENERALIZED ANXIETY DISORDER): Chronic | Status: ACTIVE | Noted: 2020-06-19

## 2020-06-19 PROBLEM — F33.1 MODERATE EPISODE OF RECURRENT MAJOR DEPRESSIVE DISORDER (H): Chronic | Status: ACTIVE | Noted: 2019-02-11

## 2020-06-19 PROBLEM — M25.561 RIGHT KNEE PAIN: Status: ACTIVE | Noted: 2017-10-25

## 2020-06-19 NOTE — PROGRESS NOTES
"Julio Oh is a 29 year old male who is being evaluated via a billable video visit.      The patient has been notified of following:     \"This video visit will be conducted via a call between you and your physician/provider. We have found that certain health care needs can be provided without the need for an in-person physical exam.  This service lets us provide the care you need with a video conversation.  If a prescription is necessary we can send it directly to your pharmacy.  If lab work is needed we can place an order for that and you can then stop by our lab to have the test done at a later time.    Video visits are billed at different rates depending on your insurance coverage.  Please reach out to your insurance provider with any questions.    If during the course of the call the physician/provider feels a video visit is not appropriate, you will not be charged for this service.\"    Patient has given verbal consent for Video visit? Yes    Will anyone else be joining your video visit? No        Video-Visit Details    Type of service:  Video Visit         Distant Location (provider location):  Home    Amwell not used, changed to [facility] visit as no connection made.  Amwell appears to be archiving the visit immediately upon my entry.         Sleep Consultation:    Date on this visit: 6/22/2020    Julio Oh is sent by Jenifer Boss for a sleep consultation regarding possible obstructive sleep apnea .    Primary Physician: Jenifer Boss     Chief Complaint   Patient presents with     Consult     Discuss he has noticed he holds breath while sleeping and waking up windmallory Kim goes to bed at 10:00 PM during the week. He wakes up at 6:00 AM with an alarm.  Julio denies frequent difficulty falling asleep (2/month).  He wakes up 1-2 times a night for 5 minutes before falling back to sleep. On weekends, Julio goes to sleep at 11:00 PM.  He gets up at 10:00 AM.     Patient does use " electronics in bed and watch TV in bed.       Julio does snore some nights. Patient does have a regular bed partner..  He does have witnessed apneas. They never sleep separately.  Patient sleeps on his back and side. He denies no morning headaches and restless legs. Julio denies any sleep walking, dream enactment, sleep paralysis, cataplexy and hypnogogic/hypnopompic hallucinations.    Julio denies reflux at night.      Patient describes themself as a night person. Patient's [place] Sleepiness score 6/24 inconsistent with severe daytime sleepiness.  He feels he has both sleepiness and fatigue, more fatigue.     Julio naps 1-2 times per week. He takes no inadvertant naps.  He denies dozing while driving.  Patient was counseled on the importance of driving while alert, to pull over if drowsy, or nap before getting into the vehicle if sleepy.  He uses 4 cups of coffee. Last caffeine intake is usually before .    Recent Labs   Lab Test 07/27/18  1410 11/21/14  1928    139   POTASSIUM 4.2 4.3   CHLORIDE 103 104   CO2 31 28   ANIONGAP 6 7   GLC 65* 91   BUN 18 16   CR 1.00 1.05   PHUC 8.6 9.3     Lab Results   Component Value Date    TSH 0.92 07/27/2018     CBC RESULTS:   Recent Labs   Lab Test 07/27/18  1410   WBC 7.8   RBC 4.75   HGB 14.5   HCT 42.3   MCV 89   MCH 30.5   MCHC 34.3   RDW 13.2        He takes lorazepam 1/ 2 weeks.   He uses trazodone for insomnia and finds it effective.     Allergies:    Allergies   Allergen Reactions     No Known Drug Allergies        Medications:    Current Outpatient Medications   Medication Sig Dispense Refill     LORazepam (ATIVAN) 1 MG tablet TAKE 0.5-1 TABLET BY MOUTH THREE TIMES A DAY AS NEEDED FOR ANXIETY 15 tablet 2     PARoxetine (PAXIL) 20 MG tablet TAKE ONE TABLET BY MOUTH EVERY DAY ALONG WITH 30MG TABLETS TO MAKE 50MG DAILY 90 tablet 1     PARoxetine (PAXIL) 30 MG tablet TAKE ONE TABLET BY MOUTH EVERY DAY WITH 20 MG TO MAKE 50 MG DAILY 90 tablet 1      traZODone (DESYREL) 50 MG tablet Take 1 tablet (50 mg) by mouth At Bedtime 90 tablet 1     vitamin B complex with vitamin C (VITAMIN  B COMPLEX) TABS tablet Take 1 tablet by mouth daily  0       Problem List:  Patient Active Problem List    Diagnosis Date Noted     OCTAVIO (generalized anxiety disorder) 06/19/2020     Priority: Medium     Moderate episode of recurrent major depressive disorder (H) 02/11/2019     Priority: Medium     Right knee pain 10/25/2017     Priority: Low     CHL (conductive hearing loss) 06/27/2016     Priority: Low     CARDIOVASCULAR SCREENING; LDL GOAL LESS THAN 160 09/11/2012     Priority: Low     Insomnia 07/09/2010     Priority: Low        Past Medical/Surgical History:  Past Medical History:   Diagnosis Date     Adjustment disorder with anxiety 9/1/2010     Closed fracture of tibia 8/7/2006     Knee injury, left, initial encounter 3/22/2018     Labral tear of shoulder, right, initial encounter 2/11/2019     Tympanic membrane perforation, right 6/27/2016     Past Surgical History:   Procedure Laterality Date     PE TUBES  1998    P.E. Tubes     TYMPANOPLASTY Right 7/21/2016    Procedure: TYMPANOPLASTY;  Surgeon: Andres Joyce MD;  Location:  OR       Social History:  Social History     Socioeconomic History     Marital status: Single     Spouse name: Not on file     Number of children: 0     Years of education: 5     Highest education level: Not on file   Occupational History     Occupation: student     Comment: [employer]     Occupation: Sales     Employer: [employer]   Social Needs     Financial resource strain: Not on file     Food insecurity     Worry: Not on file     Inability: Not on file     Transportation needs     Medical: Not on file     Non-medical: Not on file   Tobacco Use     Smoking status: Never Smoker     Smokeless tobacco: Never Used     Tobacco comment: no nicotine exposure   Substance and Sexual Activity     Alcohol use: No     Drug use: No      Sexual activity: Yes     Partners: Female     Birth control/protection: Pill, Condom   Lifestyle     Physical activity     Days per week: Not on file     Minutes per session: Not on file     Stress: Not on file   Relationships     Social connections     Talks on phone: Not on file     Gets together: Not on file     Attends Congregational service: Not on file     Active member of club or organization: Not on file     Attends meetings of clubs or organizations: Not on file     Relationship status: Not on file     Intimate partner violence     Fear of current or ex partner: Not on file     Emotionally abused: Not on file     Physically abused: Not on file     Forced sexual activity: Not on file   Other Topics Concern      Service Not Asked     Blood Transfusions Not Asked     Caffeine Concern Yes     Comment: 1 can QD     Occupational Exposure Not Asked     Hobby Hazards Not Asked     Sleep Concern Not Asked     Stress Concern Not Asked     Weight Concern Not Asked     Special Diet Not Asked     Back Care Not Asked     Exercise Not Asked     Bike Helmet Not Asked     Seat Belt Yes     Self-Exams Not Asked     Parent/sibling w/ CABG, MI or angioplasty before 65F 55M? Not Asked   Social History Narrative     Not on file       Family History:  Family History   Problem Relation Age of Onset     Diabetes Maternal Grandmother         Type II     Cancer - colorectal Maternal Grandfather      Eye Disorder Maternal Grandfather         cataracts     Prostate Cancer Maternal Grandfather      Heart Disease Paternal Grandmother         bypass     Heart Disease Paternal Grandfather         bypass       Review of Systems:  A complete review of systems reviewed by me is negative with the exeption of what has been mentioned in the history of present illness.  CONSTITUTIONAL: NEGATIVE for weight gain/loss, fever, chills, sweats or night sweats, drug allergies.  EYES: NEGATIVE for changes in vision, blind spots, double vision.  ENT:  NEGATIVE for ear pain, sore throat, sinus pain, post-nasal drip, runny nose, bloody nose  CARDIAC: NEGATIVE for fast heartbeats or fluttering in chest, chest pain or pressure, breathlessness when lying flat, swollen legs or swollen feet.  NEUROLOGIC: NEGATIVE headaches, weakness or numbness in the arms or legs.  DERMATOLOGIC: NEGATIVE for rashes, new moles or change in mole(s)  PULMONARY: NEGATIVE SOB at rest, SOB with activity, dry cough, productive cough, coughing up blood, wheezing or whistling when breathing.    GASTROINTESTINAL: NEGATIVE for nausea or vomitting, loose or watery stools, fat or grease in stools, constipation, abdominal pain, bowel movements black in color or blood noted.  GENITOURINARY: NEGATIVE for pain during urination, blood in urine, urinating more frequently than usual, irregular menstrual periods.  MUSCULOSKELETAL: NEGATIVE for muscle pain, bone or joint pain, swollen joints.  ENDOCRINE: NEGATIVE for increased thirst or urination, diabetes.  LYMPHATIC: NEGATIVE for swollen lymph nodes, lumps or bumps in the breasts or nipple discharge.    Physical Examination:  Vitals: There were no vitals taken for this visit.  BMI= There is no height or weight on file to calculate BMI.         Desdemona Total Score 6/19/2020   Total score - Desdemona 6       NICK Total Score: 8 (06/19/20 1000)    SpO2 Readings from Last 4 Encounters:   11/14/19 98%   02/11/19 100%   10/09/18 99%   07/27/18 97%       GENERAL APPEARANCE: alert and no distress  EYES: Eyes grossly normal to inspection  HENT: mouth without ulcers or lesions  NECK: not generous size  LUNGS: no shortness of breath , cough  NEURO: mentation intact, speech normal and cranial nerves 2-12 appear intact  PSYCH: affect normal/bright  Mallampati Class: 2-3      Impression/Plan:    Witnessed apneas, mild/occasional snoring, fatigue/excessive daytime sleepiness (ESS 6).  Would usually recommend Polysomnogram because not high probability obstructive sleep  apnea, but given Covid19 restrictions will start with Home Sleep ApneaTest. STOPBANG  3-4    Insomnia   Sleep onset difficulties due to anxiety/overactive brain. Managed with trazadone    He will follow up with me in approximately two weeks after his sleep study has been competed to review the results and discuss plan of care.       Polysomnography reviewed.  Obstructive sleep apnea reviewed.  Complications of untreated sleep apnea were reviewed.    I spent >40 minutes with patient.       CC: Jenifer Boss

## 2020-06-22 ENCOUNTER — VIRTUAL VISIT (OUTPATIENT)
Dept: SLEEP MEDICINE | Facility: CLINIC | Age: 30
End: 2020-06-22
Attending: FAMILY MEDICINE
Payer: COMMERCIAL

## 2020-06-22 DIAGNOSIS — R53.81 MALAISE AND FATIGUE: ICD-10-CM

## 2020-06-22 DIAGNOSIS — G47.9 DISTURBANCE IN SLEEP BEHAVIOR: Primary | ICD-10-CM

## 2020-06-22 DIAGNOSIS — R06.89 DYSPNEA AND RESPIRATORY ABNORMALITY: ICD-10-CM

## 2020-06-22 DIAGNOSIS — R53.83 MALAISE AND FATIGUE: ICD-10-CM

## 2020-06-22 DIAGNOSIS — R06.00 DYSPNEA AND RESPIRATORY ABNORMALITY: ICD-10-CM

## 2020-06-22 PROCEDURE — 99204 OFFICE O/P NEW MOD 45 MIN: CPT | Mod: 95 | Performed by: INTERNAL MEDICINE

## 2020-09-09 DIAGNOSIS — F51.01 PRIMARY INSOMNIA: ICD-10-CM

## 2020-09-11 RX ORDER — TRAZODONE HYDROCHLORIDE 50 MG/1
TABLET, FILM COATED ORAL
Qty: 90 TABLET | Refills: 1 | Status: SHIPPED | OUTPATIENT
Start: 2020-09-11 | End: 2021-03-10

## 2020-09-11 NOTE — TELEPHONE ENCOUNTER
Prescription approved per [facility].      Amairani Sepulveda RN, BSN, PHN

## 2020-11-05 ENCOUNTER — MYC MEDICAL ADVICE (OUTPATIENT)
Dept: FAMILY MEDICINE | Facility: CLINIC | Age: 30
End: 2020-11-05

## 2020-11-06 ENCOUNTER — MYC MEDICAL ADVICE (OUTPATIENT)
Dept: FAMILY MEDICINE | Facility: CLINIC | Age: 30
End: 2020-11-06

## 2020-11-06 ENCOUNTER — VIRTUAL VISIT (OUTPATIENT)
Dept: FAMILY MEDICINE | Facility: CLINIC | Age: 30
End: 2020-11-06
Payer: COMMERCIAL

## 2020-11-06 DIAGNOSIS — Z20.822 EXPOSURE TO COVID-19 VIRUS: Primary | ICD-10-CM

## 2020-11-06 PROCEDURE — 99213 OFFICE O/P EST LOW 20 MIN: CPT | Mod: 95 | Performed by: NURSE PRACTITIONER

## 2020-11-06 NOTE — PATIENT INSTRUCTIONS
"  Patient Education     Coronavirus Disease 2019 (COVID-19): Prevention  The best prevention is to have no contact with the SARS-CoV-2 virus. There is no vaccine yet.  It s important to keep up on vaccines for other illnesses, including flu and pnuemonia. This is even more true if you re at higher risk for severe illness. Everyone 6 months and older should get a yearly flu vaccine, with rare exceptions.  Cancel travel and other outings  Stay informed about COVID-19 in your area. Follow local instructions. School, sporting events and other activities may be cancelled. You may need to avoid public gatherings.  Stay at least 6 feet away from others as much as possible. This is called \"social distancing.\" You may also be asked to stay at home and isolate yourself. You may hear terms like \"self-isolate, \"quarantine,\"  stay at home,   shelter in place,  and  lockdown.   Don t travel to areas with a COVID-19 outbreak. Don t go on a cruise. It s best to cancel any non-essential travel right now. For the most up-to-date travel advisories, visit the CDC website at www.cdc.gov/coronavirus/2019-ncov/travelers.  When you re at home    Wash your hands often. Use soap and clean, running water for at least 20 seconds. Or, use hand  that has at least 60% alcohol.    Don t touch your eyes, nose, or mouth unless you have clean hands.    Don t kiss someone who is sick.    If you need to cough or sneeze, do it into a tissue. Then throw the tissue into the trash. If you don t have tissues, cough or sneeze into the bend of your elbow.    Try to avoid \"high-touch\" surfaces, like doorknobs, handles, light switches, desks, printers, phones, kitchen counters, tables and bathroom surfaces. Clean these often with disinfectant (read the label for instructions). For cleaning tips, go to www.cdc.gov/coronavirus/2019-ncov/prepare/cleaning-disinfection.html.    Check your home supplies. Consider keeping a 2-week supply of medicines, food, " "and other needed household items.    Make a plan for childcare, work, and ways to stay in touch with others. Know who will help you if you get sick.    Don t be around people who are sick.    Don t share eating or drinking utensils with sick people.    Wash your hands after touching animals. Don t touch animals that may be sick.    If you leave home      Stay at least 6 feet away from all people.    Try to avoid \"high-touch\" public surfaces, like doorknobs, handles, and light switches. If you need to touch these, clean them first with a disinfecting wipe. Or, touch them using a tissue or paper towel.    Use hand  often. Make sure it has at least 60% alcohol.    Don t touch your eyes, nose, or mouth unless you have clean hands.    If you need to cough or sneeze, do it into a tissue. Then, throw the tissue into the trash. If you don t have tissues, cough or sneeze into the bend of your elbow.    Avoid public gatherings. If you do attend public gatherings, stay at least 6 feet away from others. Don t share food, water bottles, or other personal items.    Anyone over age 2 should wear a cloth face mask in public, especially when it s hard to socially distance. This includes public transit, public protests and marches, and crowded stores, bars, and restaurants.  ? The mask should cover both your nose and mouth. You may need to make your own mask using a bandana, T-shirt, or other cloth. See the CDC s instructions on how to make a mask.  ? Face masks are most likely to reduce the spread of COVID-19 when they are widely used by people who are out in the public.    Certain people should not wear a face mask. These include:  ? Children under 2 years old  ? Anyone with a health, developmental, or mental health condition that can be made worse by wearing a mask  ? Anyone who is unconscious or unable to remove the face covering without help. See the CDC s guidance on who should not wear a face mask.  If you are at a " work site    Follow all of the instructions above.    If you feel sick in any way, go home and stay home.    Tell your manager if you are well but live with someone who has COVID-19.    Wash your hands often with soap and clean, running water for at least 20 seconds. Or, use hand  with at least 60% alcohol.    Don t shake hands. Don t have in-person meetings. (Meet over phone or video, if possible.)    Don t use other people s desks, offices, phones or equipment (pens, keyboards, eating or drinking utensils, etc.), if possible.    Use office jaqueline one person at a time. Don t share coffee, tea or food in the office. Don t have meals in groups.    Wear a mask over your nose and mouth.    Clean work surfaces often with disinfectant. These include desks, photocopiers, printers, phones, kitchen counters, fridge door handles, bathroom surfaces, and others.    If you ve been around someone with COVID-19  In the past 14 days, if you ve been around someone who has (or may have) COVID-19:    Contact your care team to ask for advice. Follow all instructions. You may need to stay home and limit your activities for up to 2 weeks.    Take your temperature every morning and evening for at least 14 days. This is to check for fever. Keep a record of the readings.    Watch for symptoms of the virus. (See the CDC s symptom .) If you have symptoms, contact your care team.    Stay home if you re sick for any reason. If you need to go to a clinic or hospital, call ahead to let them know you re coming.    If you ve had COVID-19 in the last 3 months, but now you re symptom-free, your limits are different: You don t need to self-isolate. You don t need to be re-tested for COVID-19, unless you have symptoms of the virus. (If you do develop symptoms, stay home.)    If you had COVID-19 more than 3 months ago, and you ve been exposed again: Treat it like you ve never had COVID-19. Stay home, limit your contact with others,  call your care team, and check for symptoms.  When to contact your care team  Call your care team if you think you have COVID-19 symptoms. These can include fever, cough, trouble breathing, body aches, headaches, chills or repeated shaking with chills, sore throat, loss of taste or smell, or diarrhea (loose, watery poops). Don t go to a clinic or hospital before speaking to your care team.  Last modified date: 10/13/2020  [employer] last reviewed this educational content on 4/1/2020  This information has been modified by your health care provider with permission from the publisher.    2259-7117 [employer]. [address] [place] [address], [place] [address]. All rights reserved. This information is not intended as a substitute for professional medical care. Always follow your healthcare professional s instructions.

## 2020-11-06 NOTE — PROGRESS NOTES
"Julio Oh is a 29 year old male who is being evaluated via a billable telephone visit.      The patient has been notified of following:     \"This telephone visit will be conducted via a call between you and your physician/provider. We have found that certain health care needs can be provided without the need for a physical exam.  This service lets us provide the care you need with a short phone conversation.  If a prescription is necessary we can send it directly to your pharmacy.  If lab work is needed we can place an order for that and you can then stop by our lab to have the test done at a later time.    Telephone visits are billed at different rates depending on your insurance coverage. During this emergency period, for some insurers they may be billed the same as an in-person visit.  Please reach out to your insurance provider with any questions.    If during the course of the call the physician/provider feels a telephone visit is not appropriate, you will not be charged for this service.\"    Patient has given verbal consent for Telephone visit?  Yes    What phone number would you like to be contacted at? .      How would you like to obtain your AVS? Aramis Mccoy     Julio Oh is a 29 year old male who presents via phone visit today for the following health issues:    HPI       Concern for COVID-19  About how many days ago did these symptoms start? yesterday  Is this your first visit for this illness? Yes  In the 14 days before your symptoms started, have you had close contact with someone with COVID-19 (Coronavirus)? No  Do you have a fever or chills? Yes, I felt feverish or had chills  Are you having new or worsening difficulty breathing? No  Do you have new or worsening cough? Yes, it's a dry cough.   Have you had any new or unexplained body aches? YES    Have you experienced any of the following NEW symptoms?    Headache: YES  yesterdaySore throat: YES    Loss of taste or smell: " No    Chest pain: No    Diarrhea: No    Rash: No  What treatments have you tried? NONE  Who do you live with? Girlfriend and her cousin  Are you, or a household member, a healthcare worker or a ? YES- Girlfriend is RN and does home health care  Do you live in a nursing home, group home, or shelter? No  Do you have a way to get food/medications if quarantined? Yes, I have a friend or family member who can help me.    Review of Systems   Constitutional, HEENT, cardiovascular, pulmonary, gi and gu systems are negative, except as otherwise noted.       Objective          Vitals:  No vitals were obtained today due to virtual visit.    healthy, alert and no distress  PSYCH: Alert and oriented times 3; coherent speech, normal   rate and volume, able to articulate logical thoughts, able   to abstract reason, no tangential thoughts, no hallucinations   or delusions  His affect is normal and pleasant  RESP: No cough, no audible wheezing, able to talk in full sentences  Remainder of exam unable to be completed due to telephone visits    No results found for this or any previous visit (from the past 24 hour(s)).            Assessment & Plan     Exposure to COVID-19 virus  Home care reviewed in detail, discussed indications for OV/UC/ER visit. Follow back for new/worsening symptoms.  - Symptomatic COVID-19 Virus (Coronavirus) by PCR          No follow-ups on file.    PATRICK Becerra [facility]    Phone call duration:  9 minutes

## 2020-11-09 ENCOUNTER — MYC MEDICAL ADVICE (OUTPATIENT)
Dept: FAMILY MEDICINE | Facility: CLINIC | Age: 30
End: 2020-11-09

## 2020-11-11 ENCOUNTER — VIRTUAL VISIT (OUTPATIENT)
Dept: FAMILY MEDICINE | Facility: CLINIC | Age: 30
End: 2020-11-11
Payer: COMMERCIAL

## 2020-11-11 DIAGNOSIS — J06.9 UPPER RESPIRATORY TRACT INFECTION, UNSPECIFIED TYPE: Primary | ICD-10-CM

## 2020-11-11 DIAGNOSIS — F33.1 MODERATE EPISODE OF RECURRENT MAJOR DEPRESSIVE DISORDER (H): ICD-10-CM

## 2020-11-11 DIAGNOSIS — F41.1 GAD (GENERALIZED ANXIETY DISORDER): ICD-10-CM

## 2020-11-11 PROCEDURE — 99213 OFFICE O/P EST LOW 20 MIN: CPT | Mod: 95 | Performed by: FAMILY MEDICINE

## 2020-11-11 RX ORDER — PAROXETINE 30 MG/1
TABLET, FILM COATED ORAL
Qty: 90 TABLET | Refills: 1 | Status: SHIPPED | OUTPATIENT
Start: 2020-11-11 | End: 2021-06-09

## 2020-11-11 RX ORDER — PAROXETINE 20 MG/1
TABLET, FILM COATED ORAL
Qty: 90 TABLET | Refills: 1 | Status: SHIPPED | OUTPATIENT
Start: 2020-11-11 | End: 2021-06-09

## 2020-11-11 NOTE — PROGRESS NOTES
"Julio Oh is a 29 year old male who is being evaluated via a billable telephone visit.      The patient has been notified of following:     \"This telephone visit will be conducted via a call between you and your physician/provider. We have found that certain health care needs can be provided without the need for a physical exam.  This service lets us provide the care you need with a short phone conversation.  If a prescription is necessary we can send it directly to your pharmacy.  If lab work is needed we can place an order for that and you can then stop by our lab to have the test done at a later time.    Telephone visits are billed at different rates depending on your insurance coverage. During this emergency period, for some insurers they may be billed the same as an in-person visit.  Please reach out to your insurance provider with any questions.    If during the course of the call the physician/provider feels a telephone visit is not appropriate, you will not be charged for this service.\"    Patient has given verbal consent for Telephone visit?  Yes    What phone number would you like to be contacted at? [phone]    How would you like to obtain your AVS? Aramis Mccoy     Julio Oh is a 29 year old male who presents via phone visit today for the following health issues:    HPI     Patient would like to get a letter to get back to work.    Telephone visit with patient today for return to work note.  Last week developed some upper respiratory symptoms.  Had an outside Covid test which was negative.  So he has been home from work awaiting that result as well as improvement.  He is 90% back to normal.  No fever and just a little chest congestion.  So he like to return to work tomorrow.  Doing well on his current dosage of Paxil and desires no change.    Review of Systems   Constitutional, HEENT, cardiovascular, pulmonary, gi and gu systems are negative, except as otherwise noted.   "     Objective          Vitals:  No vitals were obtained today due to virtual visit.    healthy, alert and no distress  PSYCH: Alert and oriented times 3; coherent speech, normal   rate and volume, able to articulate logical thoughts, able   to abstract reason, no tangential thoughts, no hallucinations   or delusions  His affect is normal  RESP: No cough, no audible wheezing, able to talk in full sentences  Remainder of exam unable to be completed due to telephone visits    Past labs reviewed with the patient.         Assessment/Plan:    Assessment & Plan     Upper respiratory tract infection, unspecified type  Not Covid.  Resolving.  Return to work note written.    Moderate episode of recurrent major depressive disorder (H)  Stable on current regimen.  Continue same plan and routine follow-up.   - PARoxetine (PAXIL) 20 MG tablet; TAKE ONE TABLET BY MOUTH EVERY DAY ALONG WITH 30MG TABLETS TO MAKE 50MG DAILY  - PARoxetine (PAXIL) 30 MG tablet; TAKE ONE TABLET BY MOUTH EVERY DAY WITH 20 MG TO MAKE 50 MG DAILY    OCTAVIO (generalized anxiety disorder)  Stable on current regimen.  Continue same plan and routine follow-up.   - PARoxetine (PAXIL) 20 MG tablet; TAKE ONE TABLET BY MOUTH EVERY DAY ALONG WITH 30MG TABLETS TO MAKE 50MG DAILY  - PARoxetine (PAXIL) 30 MG tablet; TAKE ONE TABLET BY MOUTH EVERY DAY WITH 20 MG TO MAKE 50 MG DAILY        See Patient Instructions    No follow-ups on file.    Jenifer Boss MD  [facility]    Phone call duration:  8 minutes

## 2020-11-11 NOTE — LETTER
[address] [address]  Phone: [phone]    November 11, 2020        Julio Oh  [address]  [place] [address]          To whom it may concern:    RE: Julio Oh    COVID negative.  OK to return to work 11/12/2020.    Please contact me for questions or concerns.      Sincerely,        Jenifer Boss MD

## 2021-01-09 ENCOUNTER — HEALTH MAINTENANCE LETTER (OUTPATIENT)
Age: 31
End: 2021-01-09

## 2021-02-10 ENCOUNTER — TELEPHONE (OUTPATIENT)
Dept: FAMILY MEDICINE | Facility: CLINIC | Age: 31
End: 2021-02-10

## 2021-02-10 ASSESSMENT — ANXIETY QUESTIONNAIRES
IF YOU CHECKED OFF ANY PROBLEMS ON THIS QUESTIONNAIRE, HOW DIFFICULT HAVE THESE PROBLEMS MADE IT FOR YOU TO DO YOUR WORK, TAKE CARE OF THINGS AT HOME, OR GET ALONG WITH OTHER PEOPLE: SOMEWHAT DIFFICULT
7. FEELING AFRAID AS IF SOMETHING AWFUL MIGHT HAPPEN: NOT AT ALL
6. BECOMING EASILY ANNOYED OR IRRITABLE: SEVERAL DAYS
2. NOT BEING ABLE TO STOP OR CONTROL WORRYING: NOT AT ALL
5. BEING SO RESTLESS THAT IT IS HARD TO SIT STILL: NOT AT ALL
1. FEELING NERVOUS, ANXIOUS, OR ON EDGE: NOT AT ALL
3. WORRYING TOO MUCH ABOUT DIFFERENT THINGS: NOT AT ALL
GAD7 TOTAL SCORE: 1

## 2021-02-10 ASSESSMENT — PATIENT HEALTH QUESTIONNAIRE - PHQ9
SUM OF ALL RESPONSES TO PHQ QUESTIONS 1-9: 0
5. POOR APPETITE OR OVEREATING: NOT AT ALL

## 2021-02-10 NOTE — TELEPHONE ENCOUNTER
Panel Management Review   One phone call and send letter if unable to reach them or TipHive[employer] message and send letter if not read after 2 weeks (You will get a message to your inbasket)      BP Readings from Last 1 Encounters:   11/14/19 114/75        Health Maintenance Due   Topic Date Due     DEPRESSION ACTION PLAN  1990     PREVENTIVE CARE VISIT  04/16/2004     PHQ-9  02/27/2020     INFLUENZA VACCINE (1) 09/01/2020        Fail List measure:     Depression / Dysthymia review    Measure:  Needs PHQ-9 score of 4 or less during index window.  Administer PHQ-9 and if score is 5 or more, send encounter to provider for next steps.    5 - 7 month window range:     PHQ-9 SCORE 11/25/2018 1/21/2019 8/27/2019   PHQ-9 Total Score - - -   PHQ-9 Total Score MyChart 20 (Severe depression) - -   PHQ-9 Total Score 20 11 4       If PHQ-9 recheck is 5 or more, route to provider for next steps.    Patient is due for:  PHQ9        Patient is due/failing the following:   PHQ9    Action needed:   Did depression screening over the phone    Type of outreach:    Phone, spoke to patient.  did screening over the phone    Questions for provider review:    None                                                                                                                          Kya Odell

## 2021-02-11 ASSESSMENT — ANXIETY QUESTIONNAIRES: GAD7 TOTAL SCORE: 1

## 2021-03-10 DIAGNOSIS — F51.01 PRIMARY INSOMNIA: ICD-10-CM

## 2021-03-10 RX ORDER — TRAZODONE HYDROCHLORIDE 50 MG/1
TABLET, FILM COATED ORAL
Qty: 90 TABLET | Refills: 0 | Status: SHIPPED | OUTPATIENT
Start: 2021-03-10 | End: 2021-06-09

## 2021-03-10 NOTE — TELEPHONE ENCOUNTER
Routing refill request to provider for review/approval because:  Drug interaction warning  Bernadette Viveros RN

## 2021-06-09 DIAGNOSIS — F41.1 GAD (GENERALIZED ANXIETY DISORDER): ICD-10-CM

## 2021-06-09 DIAGNOSIS — F51.01 PRIMARY INSOMNIA: ICD-10-CM

## 2021-06-09 DIAGNOSIS — F33.1 MODERATE EPISODE OF RECURRENT MAJOR DEPRESSIVE DISORDER (H): ICD-10-CM

## 2021-06-09 RX ORDER — PAROXETINE 20 MG/1
TABLET, FILM COATED ORAL
Qty: 30 TABLET | Refills: 0 | Status: SHIPPED | OUTPATIENT
Start: 2021-06-09 | End: 2021-06-14

## 2021-06-09 RX ORDER — TRAZODONE HYDROCHLORIDE 50 MG/1
TABLET, FILM COATED ORAL
Qty: 90 TABLET | Refills: 0 | Status: SHIPPED | OUTPATIENT
Start: 2021-06-09 | End: 2021-09-10

## 2021-06-09 RX ORDER — PAROXETINE 30 MG/1
TABLET, FILM COATED ORAL
Qty: 30 TABLET | Refills: 0 | Status: SHIPPED | OUTPATIENT
Start: 2021-06-09 | End: 2021-09-10

## 2021-06-09 NOTE — TELEPHONE ENCOUNTER
"Requested Prescriptions   Pending Prescriptions Disp Refills    PARoxetine (PAXIL) 30 MG tablet [Pharmacy Med Name: PAROXETINE HCL 30MG TABS] 90 tablet 1     Sig: TAKE ONE TABLET BY MOUTH EVERY DAY WITH 20 MG TO MAKE 50 MG DAILY       SSRIs Protocol Failed - 6/9/2021  9:41 AM        Failed - Recent (6 mo) or future (30 days) visit within the authorizing provider's specialty     Patient had office visit in the last 6 months or has a visit in the next 30 days with authorizing provider or within the authorizing provider's specialty.  See \"Patient Info\" tab in inbasket, or \"Choose Columns\" in Meds & Orders section of the refill encounter.            Passed - PHQ-9 score less than 5 in past 6 months     Please review last PHQ-9 score.           Passed - Medication is active on med list        Passed - Patient is age 18 or older          PARoxetine (PAXIL) 20 MG tablet [Pharmacy Med Name: PAROXETINE HCL 20MG TABS] 90 tablet 1     Sig: TAKE ONE TABLET BY MOUTH EVERY DAY ALONG WITH 30MG TABLETS TO MAKE 50MG DAILY       SSRIs Protocol Failed - 6/9/2021  9:41 AM        Failed - Recent (6 mo) or future (30 days) visit within the authorizing provider's specialty     Patient had office visit in the last 6 months or has a visit in the next 30 days with authorizing provider or within the authorizing provider's specialty.  See \"Patient Info\" tab in inbasket, or \"Choose Columns\" in Meds & Orders section of the refill encounter.            Passed - PHQ-9 score less than 5 in past 6 months     Please review last PHQ-9 score.           Passed - Medication is active on med list        Passed - Patient is age 18 or older         Signed Prescriptions Disp Refills    traZODone (DESYREL) 50 MG tablet 90 tablet 0     Sig: TAKE ONE TABLET BY MOUTH EVERY NIGHT AT BEDTIME       Serotonin Modulators Passed - 6/9/2021  9:41 AM        Passed - Recent (12 mo) or future (30 days) visit within the authorizing provider's specialty     Patient has had " "an office visit with the authorizing provider or a provider within the authorizing providers department within the previous 12 mos or has a future within next 30 days. See \"Patient Info\" tab in inbasket, or \"Choose Columns\" in Meds & Orders section of the refill encounter.              Passed - Medication is active on med list        Passed - Patient is age 18 or older             "

## 2021-06-10 ENCOUNTER — MYC MEDICAL ADVICE (OUTPATIENT)
Dept: FAMILY MEDICINE | Facility: CLINIC | Age: 31
End: 2021-06-10

## 2021-06-11 ENCOUNTER — NURSE TRIAGE (OUTPATIENT)
Dept: FAMILY MEDICINE | Facility: CLINIC | Age: 31
End: 2021-06-11

## 2021-06-11 NOTE — PROGRESS NOTES
"    {PROVIDER CHARTING PREFERENCE:637402}    Nadine Phelan is a 30 year old who presents for the following health issues {ACCOMPANIED BY STATEMENT (Optional):743275}    HPI     Concern - groin pain  Onset: ***  Description: ***  Intensity: {.:021828}  Progression of Symptoms:  {.:595253}  Accompanying Signs & Symptoms: ***  Previous history of similar problem: ***  Precipitating factors:        Worsened by: ***  Alleviating factors:        Improved by: ***  Therapies tried and outcome: {NONE DEFAULTED:492361::\" none \"}    {additonal problems for provider to add (Optional):625275}    Review of Systems   {ROS COMP (Optional):207805}      Objective    There were no vitals taken for this visit.  There is no height or weight on file to calculate BMI.  Physical Exam   {Exam List (Optional):544667}    {Diagnostic Test Results (Optional):248984}    {AMBULATORY ATTESTATION (Optional):757323}        "

## 2021-06-11 NOTE — TELEPHONE ENCOUNTER
[facility]  There are2 red ridges on service and very swollen   Pus at beginning when cut shaving few days ago, could be related similar area  Hx of staph so concerned about infection risk  Feels better today, less swollen and red.    No fever or other symptoms ok to see provider within 3 days, scheduled same-day appointment Lulu Rosesanjana Monday 6/14 Tapan will go to urgent care if signs & symptoms of fever or if redness spreading/streaking.     Reason for Disposition    Wound looks infected    Wound hasn't healed within 10 days after the injury    Additional Information    Negative: Bright red, wide-spread, sunburn-like rash    Negative: Black (necrotic) or blisters develop in wound    Negative: Looks infected (spreading redness, red streak, pus) and fever    Negative: Patient sounds very sick or weak to the triager    Negative: Stitches and not infected    Negative: Surgical wound infection suspected (post-op)    Negative: Severe pain in the wound    Negative: Red streak runs from the wound    Negative: Facial wound looks infected (spreading redness)    Negative: Finger wound and entire finger swollen    Negative: Skin redness around the wound larger than 2 inches (5 cm)    Negative: Pus or cloudy fluid draining from wound     Not currently    Negative: Taking antibiotic > 48 hours and fever persists    Negative: Taking antibiotic > 72 hours (3 days) and infected wound not improved (pain, pus, redness)    Negative: No prior tetanus shots (or is not fully vaccinated) and any wound (e.g., cut or scrape)    Negative: HIV positive or severe immunodeficiency (severely weak immune system) and DIRTY cut or scrape    Negative: Patient wants to be seen    Negative: Pimple where a stitch comes through the skin    Negative: Wound becomes more painful or swollen, 3 or more days after injury    Negative: Last tetanus shot > 5 years ago and wound from DIRTY cut or scrape    Negative: Last tetanus  shot > 10 years ago    Protocols used: GENITAL INJURY - MALE-A-OH, WOUND INFECTION-A-OH

## 2021-06-11 NOTE — TELEPHONE ENCOUNTER
See separate telephone encounter, called to triage MyChart message below    Nidhi Mina RN, BSN, CMSRN  [facility]    MyChart Message  Good Evening Dr. Boss!     I'm experiencing some weird sensations in my groin area like tingling and burning with a very swollen red area just above my crotch. This past weekend I nicked myself shaving below the waist and then attended wrestling practice with the air conditioning out so I'm concerned it could be staph. I have been intimate only with my girlfriend. It's been getting increasingly more painful throughout the day making it more difficult to walk.      Please let me know your thoughts and the next steps I should take. Thank you and have a good night.

## 2021-06-14 ENCOUNTER — OFFICE VISIT (OUTPATIENT)
Dept: FAMILY MEDICINE | Facility: CLINIC | Age: 31
End: 2021-06-14
Payer: COMMERCIAL

## 2021-06-14 VITALS
DIASTOLIC BLOOD PRESSURE: 73 MMHG | BODY MASS INDEX: 24.87 KG/M2 | SYSTOLIC BLOOD PRESSURE: 126 MMHG | OXYGEN SATURATION: 100 % | WEIGHT: 178.3 LBS | HEART RATE: 55 BPM | RESPIRATION RATE: 16 BRPM | TEMPERATURE: 98 F

## 2021-06-14 DIAGNOSIS — L08.9 LOCAL INFECTION OF SKIN AND SUBCUTANEOUS TISSUE: Primary | ICD-10-CM

## 2021-06-14 PROCEDURE — 99213 OFFICE O/P EST LOW 20 MIN: CPT | Performed by: NURSE PRACTITIONER

## 2021-06-14 RX ORDER — CLINDAMYCIN HCL 300 MG
300 CAPSULE ORAL
Qty: 6 CAPSULE | Refills: 0 | Status: SHIPPED | OUTPATIENT
Start: 2021-06-14 | End: 2021-06-17

## 2021-06-14 NOTE — PROGRESS NOTES
Assessment & Plan     Local infection of skin and subcutaneous tissue  Swelling is gone in groin, small scratches at base of penis, no pain right now. Trial short course antibiotic incase risk of staf as he wrestled the day he niked himself and got very sweaty. Advised the scratch itself may take longer than 3 more days to resolve  - clindamycin (CLEOCIN) 300 MG capsule; Take 1 capsule (300 mg) by mouth 2 times daily for 3 days      Return in about 1 week (around 6/21/2021), or if symptoms worsen or fail to improve.    PATRICK Quiroz, NP-C  [facility] KIMBERLY Phelan is a 30 year old who presents for the following health issues  accompanied by his self:    HPI     Patient being seen for groin pain x 1 week ago. Shaved a week ago, niked himself, had wrestling practicce. Swollen now. No fever/chills. Wears shorts at wrestling.  No issues with bowel/bladder    Review of Systems    as above      Objective    There were no vitals taken for this visit.  There is no height or weight on file to calculate BMI.  Physical Exam   : base of penis 2 small scratches noted. Bilateral groins no pain or swelling

## 2021-07-18 NOTE — TELEPHONE ENCOUNTER
"Requested Prescriptions   Pending Prescriptions Disp Refills     LORazepam (ATIVAN) 1 MG tablet [Pharmacy Med Name: LORAZEPAM 1MG TABS] 30 tablet 0     Sig: TAKE 0.5-1 TABLET BY MOUTH THREE TIMES A DAY AS NEEDED FOR ANXIETY       There is no refill protocol information for this order        traZODone (DESYREL) 50 MG tablet [Pharmacy Med Name: TRAZODONE HCL 50MG TABS] 90 tablet 2     Sig: TAKE 1 TABLET (50MG) BY MOUTH NIGHTLY FOR SLEEP       Serotonin Modulators Passed - 1/29/2020  8:39 AM        Passed - Recent (12 mo) or future (30 days) visit within the authorizing provider's specialty     Patient has had an office visit with the authorizing provider or a provider within the authorizing providers department within the previous 12 mos or has a future within next 30 days. See \"Patient Info\" tab in inbasket, or \"Choose Columns\" in Meds & Orders section of the refill encounter.              Passed - Medication is active on med list        Passed - Patient is age 18 or older        LORazepam (ATIVAN) 1 MG tablet      Last Written Prescription Date:  11/26/19  Last Fill Quantity: 30,   # refills: 0  Last Office Visit: 8/27/19  Future Office visit:       Routing refill request to provider for review/approval because:  Drug not on the [facility] or [facility] refill protocol or controlled substance    traZODone (DESYREL) 50 MG tablet  Last Written Prescription Date:  4/25/19  Last Fill Quantity: 90,  # refills: 2   Last office visit: 8/27/2019 with prescribing provider:  Dr. Boss   Future Office Visit:      " 68 yo M with PMhx of CAD s/p CABG (course at that time complicated by pleural effusion requiring chest tubes), ESRD on HD (MWF), DMII, HTN, afib on coumadin, and anemia presented from [facility] for evaluation of chest pain and GIB.       left sided pleural effusion: loculated and chronic:   CAD:  CABG:  ESRD:   DM:  HTN:  A fibrillation:   GI Bleed    7/7:    left sided pleural effusion: loculated and chronic: he has chr loculated pleural effusion on left side: He had chest tube placement in last June 2020 at another hospital: no chemistry is available but he had negative cytology at that time: This time the effusion seems slightly worse and has loculated effusion with pleural thickening: heis not SOB andhasbeen on roomair: I think , it at all, if this effusion needs to be dealt with : it is probably vats: will contact thoracic surgery : etiology not very clear as there is no previous chemistry: coult be exudative or transudatve: he is on HD and now it is a chr christiano effusion   CAD: cont per cards  CABG: cont current meds  ESRD: on HD   DM: monitor and control  HTN: controlled  A fibrillation: off ac:   GI Bleed: per gi :  DW pt and t eam1    7/8:    left sided pleural effusion:: fever: s/p left sided chest tube placement: cultures sent: however with pr criteria it seems transudative await serum LDH but pleural fluid LDH is low:  already started on broad spectrum antibiotics ID to see: await cultures:   CAD: cont per cards  CABG: cont current meds  ESRD: on HD   DM: monitor and control  HTN: controlled  A fibrillation: off ac:   GI Bleed: per gi :  DW pmd    7/9:    left sided pleural effusion:: fever: s/p left sided chest tube placement: cultures sent: however with pr criteria it seems borderline transudative and transudative by LDH criteria: the vulutres ahve been negative so far:  already started on broad spectrum antibiotics ID to see: await cultures: ? source of spesis  CAD: cont per cards  CABG: cont current meds  ESRD: on HD   DM: monitor and control  HTN: in shock: now on vasopressors:   A fibrillation: off ac:   GI Bleed: per gi :  DW pmd  micu team    7/11:      left sided pleural effusion:: fever: s/p left sided chest tube placement: cultures sent: however with pr criteria it seems borderline transudative and transudative by LDH criteria: the cultures have been negative so far:  already started on broad spectrum antibiotics : Has MSSA bactermia  CAD: cont per cards: still on vasopressors: wean as tolerated ? needs molly ro tule out IE : would defer to cards:   CABG: cont current meds  ESRD: on HD   DM: monitor and control  HTN: in shock: now on vasopressors:   A fibrillation: off ac:   GI Bleed: per gi :  DW pmd  micu team    7/12:    left sided pleural effusion:: fever: s/p left sided chest tube placement:- now removed: cultures sent: however with pr criteria it seems borderline transudative and transudative by LDH criteria: the cultures have been negative so far:  already started on broad spectrum antibiotics : Has MRSA bactermia: vanco restarted yesterday ? echo/molly  Ac hypercarbic resp fialure: needs to be watched closelyin MICU : needs bipap may need intubation" At this time he is alert and awake: and responds to simple questions:    CAD: cont per cards: still on vasopressors: wean as tolerated ? needs molly ro tule out IE : would defer to cards:   CABG: cont current meds  ESRD: on HD   DM: monitor and control  HTN: in shock: now on vasopressors:   A fibrillation: off ac:   GI Bleed: per gi :  DW pmd  cards and bedstaff    7/13:  left sided pleural effusion:: fever: s/p left sided chest tube placement:- now removed: cultures sent: however with pr criteria it seems borderline transudative and transudative by LDH criteria: the cultures have been negative so far:  already started on broad spectrum antibiotics : Has MRSA bacteremia vanco restarted yesterday ? echo/molly  Ac hypercarbic resp fialure: now intubated s/p acls protocol last night   CAD: cont per cards: still on vasopressors: wean as tolerated ? needs molly ro tule out IE : would defer to cards:   CABG: cont current meds  ESRD: on HD   DM: monitor and control  HTN: in shock: now on vasopressors:   A fibrillation: off ac:   GI Bleed: per gi :  LINDA  staff  pt is critically ill now:     7/14:  left sided pleural effusion:: fever: s/p left sided chest tube placement:- now removed: cultures sent: however with pr criteria it seems borderline transudative and transudative by LDH criteria: the cultures have been negative so far:  already started on broad spectrum antibiotics : Has MRSA bacteremia vanco restarted yesterday now for  echo/molly  Ac hypercarbic resp fialure: now intubated s/p acls protocol last night -s/p cardiac arrest: ROSC 10 mts:   CAD: cont per cards: still on vasopressors: wean as tolerated ? needs molly ro tule out IE : would defer to cards: Cotn antbitioc  CABG: cont current meds  ESRD: on HD   DM: monitor and control  HTN: in shock: now on vasopressors:   A fibrillation: off ac:   GI Bleed: per gi :  DW  staff  pt is critically ill now: dw cards    7/15:    left sided pleural effusion:: transudative pl effusion with no with: certainly not the source for MRSA in blood:   Ac hypercarbic resp fialure: now intubated s/p acls protocol last night -s/p cardiac arrest: ROSC 10 mts:   CAD: cont per cards: still on vasopressors: wean as tolerated ? needs molly ro tule out IE : would defer to cards: Cotn antbitioc  CABG: cont current meds  ESRD: on HD   DM: monitor and control  HTN: in shock: now on vasopressors:   A fibrillation: on ac:   GI Bleed: per gi :  DW  staff  pt is critically ill now:     7/16"  left sided pleural effusion:: transudative pl effusion with no with: certainly not the source for MRSA in blood: ?source for MRSA:   Ac hypercarbic resp fialure: now intubated s/p acls protocol last night -s/p cardiac arrest: ROSC 10 mts: he seems to respond off sedation:per MICU note:   CAD: cont per cards: still on vasopressors: wean as tolerated ? needs molly ro tule out IE : would defer to cards: Cotn antbitioc  CABG: cont current meds  ESRD: on HD   DM: monitor and control  HTN: in shock: now on vasopressors:   A fibrillation: on ac:   GI Bleed: per gi :  DW  staff: PMD  pt is critically ill now:      7/17:    left sided pleural effusion:: transudative pl effusion with no with: certainly not the source for MRSA in blood: ?source for MRSA: for molly on monday  Ac hypercarbic resp fialure: now intubated s/p acls protocol last night -s/p cardiac arrest: ROSC 10 mts:   CAD: cont per cards: still on vasopressors: wean as tolerated ? needs molly ro tule out IE : would defer to cards: Cont antibiotics:   CABG: cont current meds  ESRD: on HD   DM: monitor and control  HTN: in shock: now on vasopressors:   A fibrillation: on ac:   GI Bleed: per gi :  DW  staff: PMD  pt is critically ill now:    7/18:    left sided pleural effusion:: transudative pl effusion with no with: certainly not the source for MRSA in blood: ?source for MRSA: for molly on monday: he is off sedation   Ac hypercarbic resp fialure: now intubated s/p acls protocol last night -s/p cardiac arrest: ROSC 10 mts:   Acute cva: noted on ct scan head: neurology following: already on heparin   CAD: cont per cards: still on vasopressors: wean as tolerated ? needs molly ro tule out IE : would defer to cards: Cont antibiotics:   CABG: cont current meds  ESRD: on HD   DM: monitor and control  HTN: in shock: now on vasopressors:   A fibrillation: on ac:   GI Bleed: per gi :  pt is critically ill now:

## 2021-08-17 ENCOUNTER — TRANSFERRED RECORDS (OUTPATIENT)
Dept: HEALTH INFORMATION MANAGEMENT | Facility: CLINIC | Age: 31
End: 2021-08-17

## 2021-09-09 DIAGNOSIS — F41.1 GAD (GENERALIZED ANXIETY DISORDER): ICD-10-CM

## 2021-09-09 DIAGNOSIS — F33.1 MODERATE EPISODE OF RECURRENT MAJOR DEPRESSIVE DISORDER (H): ICD-10-CM

## 2021-09-09 DIAGNOSIS — F51.01 PRIMARY INSOMNIA: ICD-10-CM

## 2021-09-09 NOTE — MR AVS SNAPSHOT
"              After Visit Summary   2/19/2018    Julio Oh    MRN: [id number]           Patient Information     Date Of Birth          1990        Visit Information        Provider Department      2/19/2018 12:20 PM Jenifer Boss MD [employer] [facility] [employer]        Today's Diagnoses     Fatigue, unspecified type    -  1    Adjustment disorder with anxiety           Follow-ups after your visit        Follow-up notes from your care team     Return if symptoms worsen or fail to improve.      Who to contact     If you have questions or need follow up information about today's clinic visit or your schedule please contact [facility] directly at [phone].  Normal or non-critical lab and imaging results will be communicated to you by MyChart, letter or phone within 4 business days after the clinic has received the results. If you do not hear from us within 7 days, please contact the clinic through [employer] or phone. If you have a critical or abnormal lab result, we will notify you by phone as soon as possible.  Submit refill requests through [employer] or call your pharmacy and they will forward the refill request to us. Please allow 3 business days for your refill to be completed.          Additional Information About Your Visit        MyChart Information     [employer] gives you secure access to your electronic health record. If you see a primary care provider, you can also send messages to your care team and make appointments. If you have questions, please call your primary care clinic.  If you do not have a primary care provider, please call [phone] and they will assist you.        Care EveryWhere ID     This is your Care EveryWhere ID. This could be used by other organizations to access your [place] medical records  [id number]        Your Vitals Were     Pulse Respirations Height Pulse Oximetry BMI (Body Mass Index)       60 16 1.803 m (5' 11\") 98% 26.5 kg/m2        Blood " From: Jennifer Bishop  To: Bree Scott  Sent: 9/8/2021 7:00 PM CDT  Subject: Upcoming appointment    Just want to confirm my appointment for Oct 20th at 0900. I received a notification that an earlier appointment was available for the exact time I already had the appointment. Just making sure I am still scheduled.   Thanks   Jennifer Bishop   Pressure from Last 3 Encounters:   02/19/18 118/72   07/05/17 118/68   03/24/17 133/83    Weight from Last 3 Encounters:   02/19/18 86.2 kg (190 lb)   10/16/17 81.2 kg (179 lb)   09/25/17 81.2 kg (179 lb)              We Performed the Following     CBC with platelets     Testosterone Free and Total     TSH with free T4 reflex          Today's Medication Changes          These changes are accurate as of 2/19/18  3:27 PM.  If you have any questions, ask your nurse or doctor.               These medicines have changed or have updated prescriptions.        Dose/Directions    * PARoxetine 20 MG tablet   Commonly known as:  PAXIL   This may have changed:    - medication strength  - how much to take  - when to take this  - additional instructions   Used for:  Adjustment disorder with anxiety   Changed by:  Jenifer Boss MD        Dose:  20 mg   Take 1 tablet (20 mg) by mouth daily With 30 mg to make 50 mg daily   Quantity:  90 tablet   Refills:  1       * PARoxetine 30 MG tablet   Commonly known as:  PAXIL   This may have changed:  You were already taking a medication with the same name, and this prescription was added. Make sure you understand how and when to take each.   Used for:  Adjustment disorder with anxiety   Changed by:  Jenifer Boss MD        Dose:  30 mg   Take 1 tablet (30 mg) by mouth daily With 20 mg to make 50 mg daily   Quantity:  90 tablet   Refills:  1       * Notice:  This list has 2 medication(s) that are the same as other medications prescribed for you. Read the directions carefully, and ask your doctor or other care provider to review them with you.         Where to get your medicines      These medications were sent to [facility] - [place], [place] - [address], [address]  [address], [address], [facility] [address]     Phone:  [phone]     PARoxetine 20 MG tablet    PARoxetine 30 MG tablet         Some of these will need a paper  prescription and others can be bought over the counter.  Ask your nurse if you have questions.     Bring a paper prescription for each of these medications     LORazepam 1 MG tablet                Primary Care Provider Office Phone # Fax #    Jenifer Lavon Boss [phone] [address] [facility] [address]        Equal Access to Services     CLARENCE SAPP : Hadii aad ku hadasho Soomaali, waaxda luqadaha, qaybta kaalmada adeegyada, waxay idiin hayaan adenichol khmendelcesar lamarisela . So [facility] [phone].    ATENCIÓN: Si habla español, tiene a mac disposición servicios gratuitos de asistencia lingüística. Llame al [phone].    We comply with applicable federal civil rights laws and [place] laws. We do not discriminate on the basis of race, color, national origin, age, disability, sex, sexual orientation, or gender identity.            Thank you!     Thank you for choosing [facility]  for your care. Our goal is always to provide you with excellent care. Hearing back from our patients is one way we can continue to improve our services. Please take a few minutes to complete the written survey that you may receive in the mail after your visit with us. Thank you!             Your Updated Medication List - Protect others around you: Learn how to safely use, store and throw away your medicines at www.disposemymeds.org.          This list is accurate as of 2/19/18  3:27 PM.  Always use your most recent med list.                   Brand Name Dispense Instructions for use Diagnosis    buPROPion 75 MG tablet    WELLBUTRIN    180 tablet    Take 1 tablet (75 mg) by mouth 2 times daily    Adjustment disorder with anxiety       ibuprofen 800 MG tablet    ADVIL/MOTRIN    30 tablet    Take 1 tablet (800 mg) by mouth every 8 hours as needed for moderate pain    Acute sinusitis with symptoms > 10 days       LORazepam 1 MG tablet    ATIVAN    30 tablet    Take 0.5-1 tablets (0.5-1 mg) by  mouth 3 times daily as needed for anxiety    Adjustment disorder with anxiety       MULTIVITAMIN & MINERAL PO      Take  by mouth.        * PARoxetine 20 MG tablet    PAXIL    90 tablet    Take 1 tablet (20 mg) by mouth daily With 30 mg to make 50 mg daily    Adjustment disorder with anxiety       * PARoxetine 30 MG tablet    PAXIL    90 tablet    Take 1 tablet (30 mg) by mouth daily With 20 mg to make 50 mg daily    Adjustment disorder with anxiety       traZODone 50 MG tablet    DESYREL    90 tablet    TAKE 1 TABLET (50MG) BY MOUTH NIGHTLY FOR SLEEP    Primary insomnia       * Notice:  This list has 2 medication(s) that are the same as other medications prescribed for you. Read the directions carefully, and ask your doctor or other care provider to review them with you.

## 2021-09-10 RX ORDER — PAROXETINE 30 MG/1
TABLET, FILM COATED ORAL
Qty: 30 TABLET | Refills: 0 | Status: SHIPPED | OUTPATIENT
Start: 2021-09-10 | End: 2021-11-11

## 2021-09-10 RX ORDER — PAROXETINE 20 MG/1
TABLET, FILM COATED ORAL
Qty: 30 TABLET | Refills: 0 | Status: SHIPPED | OUTPATIENT
Start: 2021-09-10 | End: 2021-11-11

## 2021-09-10 RX ORDER — TRAZODONE HYDROCHLORIDE 50 MG/1
TABLET, FILM COATED ORAL
Qty: 90 TABLET | Refills: 0 | Status: SHIPPED | OUTPATIENT
Start: 2021-09-10 | End: 2021-10-12

## 2021-09-10 NOTE — TELEPHONE ENCOUNTER
"Routing refill request to provider for review/approval because:  Requested Prescriptions   Pending Prescriptions Disp Refills    PARoxetine (PAXIL) 20 MG tablet [Pharmacy Med Name: PAROXETINE HCL 20MG TABS] 30 tablet 0     Sig: TAKE ONE TABLET BY MOUTH EVERY DAY ALONG WITH 30MG TABLETS TO MAKE 50MG DAILY        SSRIs Protocol Failed - 9/9/2021  9:31 AM        Failed - PHQ-9 score less than 5 in past 6 months     Please review last PHQ-9 score.           Passed - Medication is active on med list        Passed - Patient is age 18 or older        Passed - Recent (6 mo) or future (30 days) visit within the authorizing provider's specialty     Patient had office visit in the last 6 months or has a visit in the next 30 days with authorizing provider or within the authorizing provider's specialty.  See \"Patient Info\" tab in inbasket, or \"Choose Columns\" in Meds & Orders section of the refill encounter.               PARoxetine (PAXIL) 30 MG tablet [Pharmacy Med Name: PAROXETINE HCL 30MG TABS] 30 tablet 0     Sig: TAKE ONE TABLET BY MOUTH EVERY DAY WITH 20 MG TO MAKE 50 MG DAILY        SSRIs Protocol Failed - 9/9/2021  9:31 AM        Failed - PHQ-9 score less than 5 in past 6 months     Please review last PHQ-9 score.           Passed - Medication is active on med list        Passed - Patient is age 18 or older        Passed - Recent (6 mo) or future (30 days) visit within the authorizing provider's specialty     Patient had office visit in the last 6 months or has a visit in the next 30 days with authorizing provider or within the authorizing provider's specialty.  See \"Patient Info\" tab in inbasket, or \"Choose Columns\" in Meds & Orders section of the refill encounter.              Signed Prescriptions Disp Refills    traZODone (DESYREL) 50 MG tablet 90 tablet 0     Sig: TAKE ONE TABLET BY MOUTH EVERY NIGHT AT BEDTIME        Serotonin Modulators Passed - 9/9/2021  9:31 AM        Passed - Recent (12 mo) or future (30 days) " "visit within the authorizing provider's specialty     Patient has had an office visit with the authorizing provider or a provider within the authorizing providers department within the previous 12 mos or has a future within next 30 days. See \"Patient Info\" tab in inbasket, or \"Choose Columns\" in Meds & Orders section of the refill encounter.              Passed - Medication is active on med list        Passed - Patient is age 18 or older                Maribel CHANEY, RN        "

## 2021-10-23 ENCOUNTER — HEALTH MAINTENANCE LETTER (OUTPATIENT)
Age: 31
End: 2021-10-23

## 2021-11-11 DIAGNOSIS — F51.01 PRIMARY INSOMNIA: ICD-10-CM

## 2021-11-11 DIAGNOSIS — F41.1 GAD (GENERALIZED ANXIETY DISORDER): ICD-10-CM

## 2021-11-11 DIAGNOSIS — F33.1 MODERATE EPISODE OF RECURRENT MAJOR DEPRESSIVE DISORDER (H): ICD-10-CM

## 2021-11-11 RX ORDER — PAROXETINE 30 MG/1
TABLET, FILM COATED ORAL
Qty: 10 TABLET | Refills: 0 | Status: SHIPPED | OUTPATIENT
Start: 2021-11-11 | End: 2021-11-16

## 2021-11-11 RX ORDER — TRAZODONE HYDROCHLORIDE 50 MG/1
TABLET, FILM COATED ORAL
Qty: 10 TABLET | Refills: 0 | Status: SHIPPED | OUTPATIENT
Start: 2021-11-11 | End: 2021-11-16

## 2021-11-11 RX ORDER — PAROXETINE 20 MG/1
TABLET, FILM COATED ORAL
Qty: 10 TABLET | Refills: 0 | Status: SHIPPED | OUTPATIENT
Start: 2021-11-11 | End: 2021-11-16

## 2021-11-15 ASSESSMENT — ANXIETY QUESTIONNAIRES
4. TROUBLE RELAXING: NOT AT ALL
8. IF YOU CHECKED OFF ANY PROBLEMS, HOW DIFFICULT HAVE THESE MADE IT FOR YOU TO DO YOUR WORK, TAKE CARE OF THINGS AT HOME, OR GET ALONG WITH OTHER PEOPLE?: SOMEWHAT DIFFICULT
1. FEELING NERVOUS, ANXIOUS, OR ON EDGE: SEVERAL DAYS
GAD7 TOTAL SCORE: 3
7. FEELING AFRAID AS IF SOMETHING AWFUL MIGHT HAPPEN: NOT AT ALL
7. FEELING AFRAID AS IF SOMETHING AWFUL MIGHT HAPPEN: NOT AT ALL
2. NOT BEING ABLE TO STOP OR CONTROL WORRYING: NOT AT ALL
GAD7 TOTAL SCORE: 3
GAD7 TOTAL SCORE: 3
3. WORRYING TOO MUCH ABOUT DIFFERENT THINGS: SEVERAL DAYS
6. BECOMING EASILY ANNOYED OR IRRITABLE: SEVERAL DAYS
5. BEING SO RESTLESS THAT IT IS HARD TO SIT STILL: NOT AT ALL

## 2021-11-16 ENCOUNTER — VIRTUAL VISIT (OUTPATIENT)
Dept: FAMILY MEDICINE | Facility: CLINIC | Age: 31
End: 2021-11-16
Payer: COMMERCIAL

## 2021-11-16 DIAGNOSIS — F51.01 PRIMARY INSOMNIA: ICD-10-CM

## 2021-11-16 DIAGNOSIS — F33.1 MODERATE EPISODE OF RECURRENT MAJOR DEPRESSIVE DISORDER (H): ICD-10-CM

## 2021-11-16 DIAGNOSIS — F41.1 GAD (GENERALIZED ANXIETY DISORDER): ICD-10-CM

## 2021-11-16 PROCEDURE — 99214 OFFICE O/P EST MOD 30 MIN: CPT | Mod: GT | Performed by: NURSE PRACTITIONER

## 2021-11-16 RX ORDER — TRAZODONE HYDROCHLORIDE 50 MG/1
TABLET, FILM COATED ORAL
Qty: 90 TABLET | Refills: 1 | Status: SHIPPED | OUTPATIENT
Start: 2021-11-16 | End: 2022-06-24

## 2021-11-16 RX ORDER — PAROXETINE 30 MG/1
30 TABLET, FILM COATED ORAL EVERY MORNING
Qty: 90 TABLET | Refills: 1 | Status: SHIPPED | OUTPATIENT
Start: 2021-11-16 | End: 2022-07-18

## 2021-11-16 ASSESSMENT — ANXIETY QUESTIONNAIRES: GAD7 TOTAL SCORE: 3

## 2021-11-16 NOTE — PROGRESS NOTES
Tapan is a 30 year old who is being evaluated via a billable video visit.      How would you like to obtain your AVS? MyChart  If the video visit is dropped, the invitation should be resent by: Text to cell phone: cell  Will anyone else be joining your video visit? No      Video Start Time: 1:36 PM    Assessment & Plan     OCTAVIO (generalized anxiety disorder)  Stable, refill sent  - PARoxetine (PAXIL) 30 MG tablet; Take 1 tablet (30 mg) by mouth every morning    Moderate episode of recurrent major depressive disorder (H)  stable  - PARoxetine (PAXIL) 30 MG tablet; Take 1 tablet (30 mg) by mouth every morning    Primary insomnia  Would like to wean off at some point. We can review this next spring.   - traZODone (DESYREL) 50 MG tablet; TAKE ONE TABLET BY MOUTH EVERY NIGHT AT BEDTIME    He is aware of drug/drug interaction. He will monitor for changes in mood. Has been on this combination of medication a long time      Return in about 6 months (around 5/16/2022) for Medication check, Phone or Video visit okay.    PATRICK Quiroz, NP-C  [facility]      Nadine Phelan is a 30 year old who presents for the following health issues  accompanied by his self.    History of Present Illness       Mental Health Follow-up:  Patient presents to follow-up on Anxiety.    Patient's anxiety since last visit has been:  Better  The patient is not having other symptoms associated with anxiety.  Any significant life events: job concerns  Patient is not feeling anxious or having panic attacks.  Patient has no concerns about alcohol or drug use.     Social History  Tobacco Use    Smoking status: Never Smoker    Smokeless tobacco: Never Used    Tobacco comment: no nicotine exposure  Alcohol use: No  Drug use: No      Today's PHQ-9         PHQ-9 Total Score:         PHQ-9 Q9 Thoughts of better off dead/self-harm past 2 weeks :       Thoughts of suicide or self harm:      Self-harm Plan:        Self-harm Action:           Safety concerns for self or others:           He eats 4 or more servings of fruits and vegetables daily.He consumes 0 sweetened beverage(s) daily.He exercises with enough effort to increase his heart rate 30 to 60 minutes per day.  He exercises with enough effort to increase his heart rate 6 days per week.   He is taking medications regularly.     Answers for HPI/ROS submitted by the patient on 11/15/2021  OCTAVIO 7 TOTAL SCORE: 3  Depression/Anxiety: Anxiety  Anxiety since last: : better  Other associated symotome: : No  Significant life event: : job concerns  Anxious:: No  Current substance use:: No  How many servings of fruits and vegetables do you eat daily?: 4 or more  On average, how many sweetened beverages do you drink each day (Examples: soda, juice, sweet tea, etc.  Do NOT count diet or artificially sweetened beverages)?: 0  How many minutes a day do you exercise enough to make your heart beat faster?: 30 to 60  How many days a week do you exercise enough to make your heart beat faster?: 6  How many days per week do you miss taking your medication?: 0   eating clean, teaching martial arts.   Just has been using the 30 mg paxil.   Still has a few ativan from 1.5 years ago, rare use.   Has been on combo of paxil and trazodone       Review of Systems   Constitutional, cardiovascular, pulmonary, gi and gu, psych as above systems are negative, except as otherwise noted.      Objective           Vitals:  No vitals were obtained today due to virtual visit.    Physical Exam   GENERAL: Healthy, alert and no distress  EYES: Eyes grossly normal to inspection.  No discharge or erythema, or obvious scleral/conjunctival abnormalities.  RESP: No audible wheeze, cough, or visible cyanosis.  No visible retractions or increased work of breathing.    SKIN: Visible skin clear. No significant rash, abnormal pigmentation or lesions.  NEURO: Cranial nerves grossly intact.  Mentation and speech appropriate for age.  PSYCH:  Mentation appears normal, affect normal/bright, judgement and insight intact, normal speech and appearance well-groomed.    Reviewed past labs. Due for cholesterol/glucose next year            Video-Visit Details    Type of service:  Video Visit    Video End Time:1:44 PM    Originating Location (pt. Location): Other in his parked car    Distant Location (provider location):  home secure location    Platform used for Video Visit: Davi

## 2022-01-05 ENCOUNTER — MYC MEDICAL ADVICE (OUTPATIENT)
Dept: FAMILY MEDICINE | Facility: CLINIC | Age: 32
End: 2022-01-05
Payer: COMMERCIAL

## 2022-01-05 ENCOUNTER — TELEPHONE (OUTPATIENT)
Dept: SLEEP MEDICINE | Facility: CLINIC | Age: 32
End: 2022-01-05
Payer: COMMERCIAL

## 2022-01-05 NOTE — TELEPHONE ENCOUNTER
Reason for call:  Other   Patient called regarding (reason for call): appointment  Additional comments: pt had consult with Svee 6/22/20 was never able to schedule Sleep Study, is wondering if he can go ahead and schedule Sleep Study now or does he need to complete another consult appointment?    Phone number to reach patient:  Home number on file [phone] (home)    Best Time:  any    Can we leave a detailed message on this number?  YES    Travel screening: Not Applicable

## 2022-01-06 ENCOUNTER — MYC MEDICAL ADVICE (OUTPATIENT)
Dept: SLEEP MEDICINE | Facility: CLINIC | Age: 32
End: 2022-01-06
Payer: COMMERCIAL

## 2022-02-12 ENCOUNTER — HEALTH MAINTENANCE LETTER (OUTPATIENT)
Age: 32
End: 2022-02-12

## 2022-02-15 ENCOUNTER — TRANSFERRED RECORDS (OUTPATIENT)
Dept: HEALTH INFORMATION MANAGEMENT | Facility: CLINIC | Age: 32
End: 2022-02-15
Payer: COMMERCIAL

## 2022-02-19 ENCOUNTER — MYC MEDICAL ADVICE (OUTPATIENT)
Dept: FAMILY MEDICINE | Facility: CLINIC | Age: 32
End: 2022-02-19
Payer: COMMERCIAL

## 2022-02-22 ENCOUNTER — OFFICE VISIT (OUTPATIENT)
Dept: SLEEP MEDICINE | Facility: CLINIC | Age: 32
End: 2022-02-22
Attending: INTERNAL MEDICINE
Payer: COMMERCIAL

## 2022-02-22 DIAGNOSIS — R53.83 MALAISE AND FATIGUE: ICD-10-CM

## 2022-02-22 DIAGNOSIS — R06.89 DYSPNEA AND RESPIRATORY ABNORMALITY: ICD-10-CM

## 2022-02-22 DIAGNOSIS — R06.00 DYSPNEA AND RESPIRATORY ABNORMALITY: ICD-10-CM

## 2022-02-22 DIAGNOSIS — R53.81 MALAISE AND FATIGUE: ICD-10-CM

## 2022-02-22 DIAGNOSIS — G47.9 DISTURBANCE IN SLEEP BEHAVIOR: ICD-10-CM

## 2022-02-22 PROCEDURE — G0399 HOME SLEEP TEST/TYPE 3 PORTA: HCPCS | Performed by: INTERNAL MEDICINE

## 2022-02-23 ENCOUNTER — APPOINTMENT (OUTPATIENT)
Dept: SLEEP MEDICINE | Facility: CLINIC | Age: 32
End: 2022-02-23
Payer: COMMERCIAL

## 2022-02-23 NOTE — PROCEDURES
"HOME SLEEP STUDY INTERPRETATION    Patient: Julio Oh  MRN: [id number]  YOB: 1990  Study Date: 2/22/2022  PCP/Referring Provider: Jenifer Boss;  Ordering Provider: Manuel Morales MD     Indications for Home Study: Julio Oh is a 31 year old male with symptoms suggestive of obstructive sleep apnea.    Estimated body mass index is 24.87 kg/m  as calculated from the following:    Height as of 2/11/19: 1.803 m (5' 11\").    Weight as of 6/14/21: 80.9 kg (178 lb 4.8 oz).    Data: A full night home sleep study was performed recording the standard physiologic parameters including body position, movement, sound, nasal pressure, thermal oral airflow, chest and abdominal movements with respiratory inductance plethysmography, and oxygen saturation by pulse oximetry. Pulse rate was estimated by oximetry recording. This study was considered adequate based on > 4 hours of quality oximetry and respiratory recording. As specified by the AASM Manual for the Scoring of Sleep and Associated events, version 2.3, Rule VIII.D 1B, 4% oxygen desaturation scoring for hypopneas is used as a standard of care on all home sleep apnea testing.    Analysis Time:  475 minutes    Respiration:   Sleep Associated Hypoxemia: sustained hypoxemia was not present. Baseline oxygen saturation was 98%.  Time with saturation less than or equal to 88% was 0.5 minutes. The lowest oxygen saturation was 84%.   Snoring: Snoring was minimal.  Respiratory events: The home study revealed a presence of 6 obstructive apneas and 45 mixed and central apneas. There were 9 hypopneas resulting in a combined apnea/hypopnea index [AHI] of 4.6, central 5.7 apnea/hypopnea index events per hour.  AHI was 8.2 per hour supine, n/a per hour prone, n/a per hour on left side, and 6.2 per hour on right side.   Pattern: Excluding events noted above, respiratory rate and pattern was Normal.    Position: Percent of time spent: supine - 55%, prone - 0%, " on left - 0%, on right - 45%.    Heart Rate: By pulse oximetry normal rate was noted.     Assessment:   Mild central sleep sleep apnea.  Sleep associated hypoxemia was not present.    Recommendations:  Consider attended polysomnogram if clinical suspicion of obstructive sleep apnea is moderate or high.   Suggest optimizing sleep hygiene and avoiding sleep deprivation.  Weight management.    Diagnosis Code(s): Central Sleep Apnea G47.31    Manuel Morales MD, February 23, 2022   Diplomate, American Board of Internal Medicine, Sleep Medicine

## 2022-02-23 NOTE — PROGRESS NOTES
This HSAT was performed using a Noxturnal T3 device which recorded snore, sound, movement activity, body position, nasal pressure, oronasal thermal airflow, pulse, oximetry and both chest and abdominal respiratory effort. HSAT data was restricted to the time patient states they were in bed.     HSAT was scored using 1B 4% hypopnea rule.     HST AHI (Non-PAT): 7.6  Snoring was reported as intermittent.  Time with SpO2 below 89% was 0.7 minutes.   Overall signal quality was good     Pt will follow up with sleep provider to determine appropriate therapy.       HST POST-STUDY QUESTIONNAIRE    1. What time did you go to bed?  9pm  2. How long do you think it took to fall asleep?  30 min  3. What time did you wake up to start the day?  6:30am  4. Did you get up during the night at all?  yes  5. If you woke up, do you remember approximately what time(s)? no  6. Did you have any difficulty with the equipment?  No  7. Did you us any type of treatment with this study?  Other Trazodone 50 Mg  8. Was the head of the bed elevated? No  9. Did you sleep in a recliner?  No  10. Did you stop using CPAP at least 3 days before this test?  NA  11. Any other information you'd like us to know? no

## 2022-02-25 NOTE — TELEPHONE ENCOUNTER
Routing request to provider to review and advise on requested labs.     Colleen Warren RN, BSN  [facility]

## 2022-02-28 NOTE — TELEPHONE ENCOUNTER
To provider to review - can schedule Pt in same-day slot if appropriate.    Colleen Warren RN, BSN  [facility]

## 2022-02-28 NOTE — TELEPHONE ENCOUNTER
Called patient to help schedule in person pre op, patient answered and we were able to get him scheduled. Thank you.

## 2022-03-03 ENCOUNTER — OFFICE VISIT (OUTPATIENT)
Dept: FAMILY MEDICINE | Facility: CLINIC | Age: 32
End: 2022-03-03
Payer: COMMERCIAL

## 2022-03-03 VITALS
WEIGHT: 181.5 LBS | DIASTOLIC BLOOD PRESSURE: 70 MMHG | BODY MASS INDEX: 24.58 KG/M2 | TEMPERATURE: 98.4 F | SYSTOLIC BLOOD PRESSURE: 121 MMHG | OXYGEN SATURATION: 97 % | HEART RATE: 60 BPM | RESPIRATION RATE: 18 BRPM | HEIGHT: 72 IN

## 2022-03-03 DIAGNOSIS — Z13.6 CARDIOVASCULAR SCREENING; LDL GOAL LESS THAN 160: ICD-10-CM

## 2022-03-03 DIAGNOSIS — S89.91XA KNEE INJURY, RIGHT, INITIAL ENCOUNTER: ICD-10-CM

## 2022-03-03 DIAGNOSIS — F33.1 MODERATE EPISODE OF RECURRENT MAJOR DEPRESSIVE DISORDER (H): ICD-10-CM

## 2022-03-03 DIAGNOSIS — Z01.818 PREOP GENERAL PHYSICAL EXAM: Primary | ICD-10-CM

## 2022-03-03 DIAGNOSIS — Z13.29 SCREENING FOR THYROID DISORDER: ICD-10-CM

## 2022-03-03 LAB
ANION GAP SERPL CALCULATED.3IONS-SCNC: 2 MMOL/L (ref 3–14)
BUN SERPL-MCNC: 18 MG/DL (ref 7–30)
CALCIUM SERPL-MCNC: 9.5 MG/DL (ref 8.5–10.1)
CHLORIDE BLD-SCNC: 105 MMOL/L (ref 94–109)
CHOLEST SERPL-MCNC: 172 MG/DL
CO2 SERPL-SCNC: 32 MMOL/L (ref 20–32)
CREAT SERPL-MCNC: 0.94 MG/DL (ref 0.66–1.25)
ERYTHROCYTE [DISTWIDTH] IN BLOOD BY AUTOMATED COUNT: 13.4 % (ref 10–15)
FASTING STATUS PATIENT QL REPORTED: YES
GFR SERPL CREATININE-BSD FRML MDRD: >90 ML/MIN/1.73M2
GLUCOSE BLD-MCNC: 96 MG/DL (ref 70–99)
HCT VFR BLD AUTO: 43.6 % (ref 40–53)
HDLC SERPL-MCNC: 59 MG/DL
HGB BLD-MCNC: 14.3 G/DL (ref 13.3–17.7)
LDLC SERPL CALC-MCNC: 99 MG/DL
MCH RBC QN AUTO: 29.2 PG (ref 26.5–33)
MCHC RBC AUTO-ENTMCNC: 32.8 G/DL (ref 31.5–36.5)
MCV RBC AUTO: 89 FL (ref 78–100)
NONHDLC SERPL-MCNC: 113 MG/DL
PLATELET # BLD AUTO: 178 10E3/UL (ref 150–450)
POTASSIUM BLD-SCNC: 4.5 MMOL/L (ref 3.4–5.3)
RBC # BLD AUTO: 4.89 10E6/UL (ref 4.4–5.9)
SODIUM SERPL-SCNC: 139 MMOL/L (ref 133–144)
TRIGL SERPL-MCNC: 72 MG/DL
TSH SERPL DL<=0.005 MIU/L-ACNC: 1.12 MU/L (ref 0.4–4)
WBC # BLD AUTO: 5.9 10E3/UL (ref 4–11)

## 2022-03-03 PROCEDURE — 36415 COLL VENOUS BLD VENIPUNCTURE: CPT | Performed by: NURSE PRACTITIONER

## 2022-03-03 PROCEDURE — 99214 OFFICE O/P EST MOD 30 MIN: CPT | Performed by: NURSE PRACTITIONER

## 2022-03-03 PROCEDURE — 80061 LIPID PANEL: CPT | Performed by: NURSE PRACTITIONER

## 2022-03-03 PROCEDURE — 85027 COMPLETE CBC AUTOMATED: CPT | Performed by: NURSE PRACTITIONER

## 2022-03-03 PROCEDURE — 84443 ASSAY THYROID STIM HORMONE: CPT | Performed by: NURSE PRACTITIONER

## 2022-03-03 PROCEDURE — 80048 BASIC METABOLIC PNL TOTAL CA: CPT | Performed by: NURSE PRACTITIONER

## 2022-03-03 RX ORDER — FERROUS SULFATE 325(65) MG
325 TABLET ORAL
COMMUNITY
End: 2023-01-10

## 2022-03-03 ASSESSMENT — PAIN SCALES - GENERAL: PAINLEVEL: MILD PAIN (3)

## 2022-03-03 NOTE — RESULT ENCOUNTER NOTE
Madelyn Oh,    Attached are your test results.  -Normal red blood cell (hgb) levels, normal white blood cell count and normal platelet levels.   Please contact us if you have any questions.    Lesly Padilla, CNP

## 2022-03-03 NOTE — PROGRESS NOTES
[address] [address]  Phone: [phone]  Primary Provider: Jenifer Boss  Pre-op Performing Provider: SABIHA CASAREZ      PREOPERATIVE EVALUATION:  Today's date: 3/3/2022    Julio Oh is a 31 year old male who presents for a preoperative evaluation.    Surgical Information:  Surgery/Procedure: ACL Repair on right knee   Surgery Location: [facility] [place]   Surgeon: Fercho Brooks   Surgery Date: 3/10/2022  Time of Surgery: TBD  Where patient plans to recover: At home with family  Fax number for surgical facility: [phone]    Type of Anesthesia Anticipated: to be determined        Subjective     HPI related to upcoming procedure: in January at martial arts competition noticed an audible on right knee       Preop Questions 3/3/2022   1. Have you ever had a heart attack or stroke? No   2. Have you ever had surgery on your heart or blood vessels, such as a stent placement, a coronary artery bypass, or surgery on an artery in your head, neck, heart, or legs? No   3. Do you have chest pain with activity? No   4. Do you have a history of  heart failure? No   5. Do you currently have a cold, bronchitis or symptoms of other infection? No did have Covid in January 2022 mid month but is recovered    6. Do you have a cough, shortness of breath, or wheezing? No   7. Do you or anyone in your family have previous history of blood clots? No   8. Do you or does anyone in your family have a serious bleeding problem such as prolonged bleeding following surgeries or cuts? No   9. Have you ever had problems with anemia or been told to take iron pills? No   10. Have you had any abnormal blood loss such as black, tarry or bloody stools? No   11. Have you ever had a blood transfusion? No   12. Are you willing to have a blood transfusion if it is medically needed before, during, or after your surgery? Yes   13. Have you or  any of your relatives ever had problems with anesthesia? No   14. Do you have sleep apnea, excessive snoring or daytime drowsiness? YES - just did a sleep study and has mild sleep apnea    14a. Do you have a CPAP machine? No   15. Do you have any artifical heart valves or other implanted medical devices like a pacemaker, defibrillator, or continuous glucose monitor? No   16. Do you have artificial joints? No   17. Are you allergic to latex? No     Health Care Directive:  Patient does not have a Health Care Directive or Living Will: Discussed advance care planning with patient; however, patient declined at this time.    Preoperative Review of :   reviewed - no record of controlled substances prescribed.      Status of Chronic Conditions:  See problem list for active medical problems.  Problems all longstanding and stable, except as noted/documented.  See ROS for pertinent symptoms related to these conditions.      Review of Systems  CONSTITUTIONAL: NEGATIVE for fever, chills, change in weight  INTEGUMENTARY/SKIN: NEGATIVE for rash   EYES: NEGATIVE for vision changes or irritation  ENT/MOUTH: NEGATIVE for ear, mouth and throat problems  RESP: NEGATIVE for significant cough or SOB  CV: NEGATIVE for chest pain, palpitations or peripheral edema  GI: NEGATIVE for nausea, abdominal pain, heartburn, or change in bowel habits  : NEGATIVE for frequency, dysuria, or hematuria  MUSCULOSKELETAL:POSITIVE  for injury to shoulders  and joint pain shoulders  and NEGATIVE for joint swelling  and joint warmth   NEURO: NEGATIVE for weakness, dizziness or paresthesias  ENDOCRINE: NEGATIVE for temperature intolerance, skin/hair changes  HEME: NEGATIVE for bleeding problems  PSYCHIATRIC: NEGATIVE for changes in mood or affect  PSYCHIATRIC: POSITIVE forHx anxiety    Patient Active Problem List    Diagnosis Date Noted     OCTAVIO (generalized anxiety disorder) 06/19/2020     Priority: Medium     Moderate episode of recurrent major  depressive disorder (H) 02/11/2019     Priority: Medium     Right knee pain 10/25/2017     Priority: Medium     CHL (conductive hearing loss) 06/27/2016     Priority: Medium     CARDIOVASCULAR SCREENING; LDL GOAL LESS THAN 160 09/11/2012     Priority: Medium     Insomnia 07/09/2010     Priority: Medium      Past Medical History:   Diagnosis Date     Adjustment disorder with anxiety 9/1/2010     Closed fracture of tibia 8/7/2006     Knee injury, left, initial encounter 3/22/2018     Labral tear of shoulder, right, initial encounter 2/11/2019     Tympanic membrane perforation, right 6/27/2016     Past Surgical History:   Procedure Laterality Date     PE TUBES  1998    P.E. Tubes     SHOULDER SURGERY Right      TYMPANOPLASTY Right 7/21/2016    Procedure: TYMPANOPLASTY;  Surgeon: Andres Joyce MD;  Location: [facility] OR     Current Outpatient Medications   Medication Sig Dispense Refill     ferrous sulfate (FEROSUL) 325 (65 Fe) MG tablet Take 325 mg by mouth daily (with breakfast)       LORazepam (ATIVAN) 1 MG tablet TAKE 0.5-1 TABLET BY MOUTH THREE TIMES A DAY AS NEEDED FOR ANXIETY 15 tablet 2     Omega-3 Fatty Acids (FISH OIL BURP-LESS PO)        PARoxetine (PAXIL) 30 MG tablet Take 1 tablet (30 mg) by mouth every morning 90 tablet 1     traZODone (DESYREL) 50 MG tablet TAKE ONE TABLET BY MOUTH EVERY NIGHT AT BEDTIME 90 tablet 1     Turmeric (QC TUMERIC COMPLEX PO)        vitamin B complex with vitamin C (VITAMIN  B COMPLEX) TABS tablet Take 1 tablet by mouth daily  0       Allergies   Allergen Reactions     No Known Drug Allergies         Social History     Tobacco Use     Smoking status: Never Smoker     Smokeless tobacco: Never Used     Tobacco comment: no nicotine exposure   Substance Use Topics     Alcohol use: No     Family History   Problem Relation Age of Onset     Diabetes Maternal Grandmother         Type II     Cancer - colorectal Maternal Grandfather      Eye Disorder Maternal Grandfather          cataracts     Prostate Cancer Maternal Grandfather      Heart Disease Paternal Grandmother         bypass     Heart Disease Paternal Grandfather         bypass     History   Drug Use No         Objective     /70   Pulse 60   Temp 98.4  F (36.9  C) (Temporal)   Resp 18   Ht 1.829 m (6')   Wt 82.3 kg (181 lb 8 oz)   SpO2 97%   BMI 24.62 kg/m      Physical Exam    GENERAL APPEARANCE: healthy, alert and no distress     EYES: Eyes grossly normal to inspection and conjunctivae and sclerae normal     HENT: ear canals and TM's normal and nose and mouth without ulcers or lesions     NECK: no adenopathy, no asymmetry, masses, or scars and thyroid normal to palpation     RESP: lungs clear to auscultation - no rales, rhonchi or wheezes     CV: regular rates and rhythm, no murmur, click or rub and no irregular beats     ABDOMEN: soft, nontender     MS: extremities normal- no gross deformities noted     SKIN: no suspicious lesions or rashes     NEURO: Normal strength and tone, sensory exam grossly normal, mentation intact and speech normal     PSYCH: mentation appears normal. and affect normal/bright     LYMPHATICS: No cervical adenopathy      Diagnostics:  Recent Results (from the past 168 hour(s))   CBC with platelets    Collection Time: 03/03/22  9:05 AM   Result Value Ref Range    WBC Count 5.9 4.0 - 11.0 10e3/uL    RBC Count 4.89 4.40 - 5.90 10e6/uL    Hemoglobin 14.3 13.3 - 17.7 g/dL    Hematocrit 43.6 40.0 - 53.0 %    MCV 89 78 - 100 fL    MCH 29.2 26.5 - 33.0 pg    MCHC 32.8 31.5 - 36.5 g/dL    RDW 13.4 10.0 - 15.0 %    Platelet Count 178 150 - 450 10e3/uL   Basic metabolic panel    Collection Time: 03/03/22  9:05 AM   Result Value Ref Range    Sodium 139 133 - 144 mmol/L    Potassium 4.5 3.4 - 5.3 mmol/L    Chloride 105 94 - 109 mmol/L    Carbon Dioxide (CO2) 32 20 - 32 mmol/L    Anion Gap 2 (L) 3 - 14 mmol/L    Urea Nitrogen 18 7 - 30 mg/dL    Creatinine 0.94 0.66 - 1.25 mg/dL    Calcium 9.5 8.5 - 10.1  mg/dL    Glucose 96 70 - 99 mg/dL    GFR Estimate >90 >60 mL/min/1.73m2   Lipid panel reflex to direct LDL Fasting    Collection Time: 03/03/22  9:05 AM   Result Value Ref Range    Cholesterol 172 <200 mg/dL    Triglycerides 72 <150 mg/dL    Direct Measure HDL 59 >=40 mg/dL    LDL Cholesterol Calculated 99 <=100 mg/dL    Non HDL Cholesterol 113 <130 mg/dL    Patient Fasting > 8hrs? Yes    TSH with free T4 reflex    Collection Time: 03/03/22  9:05 AM   Result Value Ref Range    TSH 1.12 0.40 - 4.00 mU/L      No EKG required for low risk surgery (cataract, skin procedure, breast biopsy, etc).    Revised Cardiac Risk Index (RCRI):  The patient has the following serious cardiovascular risks for perioperative complications:   - No serious cardiac risks = 0 points     RCRI Interpretation: 0 points: Class I (very low risk - 0.4% complication rate)    Assessment & Plan     The proposed surgical procedure is considered INTERMEDIATE risk.    Preop general physical exam  - CBC with platelets  - Basic metabolic panel    Knee injury, right, initial encounter  - CBC with platelets  - Basic metabolic panel      Risks and Recommendations:  The patient has the following additional risks and recommendations for perioperative complications:  Obstructive Sleep Apnea:   ARTURO Instructions:  -  - Hospital staff are advised to monitor for sleep related oxygen desaturations due to new diagnosis of  ARTURO but does not have any equipment prescribed at this time       Medication Instructions:  Before your surgery:  10 days before: stop all over the counter supplements  1 week before: stop aspirin if you are taking aspirin.   stop ibuprofen, naproxen or similar antiinflammatory medications. You may use Tylenol for pain or headache.     On the day of your surgery:  Do not take any medication the morning of your surgery.     After surgery:    You may resume all your medications after the surgery unless your surgeon instructs you  otherwise        RECOMMENDATION:  APPROVAL GIVEN to proceed with proposed procedure, without further diagnostic evaluation.           Signed Electronically by: PATRICK Patel CNP  Copy of this evaluation report is provided to requesting physician.

## 2022-03-03 NOTE — PATIENT INSTRUCTIONS
Before your surgery:  10 days before: stop all over the counter supplements  1 week before: stop aspirin if you are taking aspirin.   stop ibuprofen, naproxen or similar antiinflammatory medications. You may use Tylenol for pain or headache.     On the day of your surgery:  Do not take any medication the morning of your surgery.     After surgery:    You may resume all your medications after the surgery unless your surgeon instructs you otherwise      Patient Education   Preparing for Your Surgery  Getting started  A nurse will call you to review your health history and instructions. They will give you an arrival time based on your scheduled surgery time. Please be ready to share:    Your doctor's clinic name and phone number    Your medical, surgical and anesthesia history    A list of allergies and sensitivities    A list of medicines, including herbal treatments and over-the-counter drugs    Whether the patient has a legal guardian (ask how to send us the papers in advance)  Please tell us if you're pregnant--or if there's any chance you might be pregnant. Some surgeries may injure a fetus (unborn baby), so they require a pregnancy test. Surgeries that are safe for a fetus don't always need a test, and you can choose whether to have one.   If you have a child who's having surgery, please ask for a copy of Preparing for Your Child's Surgery.    Preparing for surgery    Within 30 days of surgery: Have a pre-op exam (sometimes called an H&P, or History and Physical). This can be done at a clinic or pre-operative center.  ? If you're having a , you may not need this exam. Talk to your care team.    At your pre-op exam, talk to your care team about all medicines you take. If you need to stop any medicines before surgery, ask when to start taking them again.  ? We do this for your safety. Many medicines can make you bleed too much during surgery. Some change how well surgery (anesthesia) drugs work.    Call  your insurance company to let them know you're having surgery. (If you don't have insurance, call [phone].)    Call your clinic if there's any change in your health. This includes signs of a cold or flu (sore throat, runny nose, cough, rash, fever). It also includes a scrape or scratch near the surgery site.    If you have questions on the day of surgery, call your hospital or surgery center.  COVID testing  You may need to be tested for COVID-19 before having surgery. If so, your surgical team will give you instructions for scheduling this test, separate from your preoperative history and physical.  Eating and drinking guidelines  For your safety: Unless your surgeon tells you otherwise, follow the guidelines below.    Eat and drink as usual until 8 hours before surgery. After that, no food or milk.    Drink clear liquids until 2 hours before surgery. These are liquids you can see through, like water, Gatorade and Propel Water. You may also have black coffee and tea (no cream or milk).    Nothing by mouth within 2 hours of surgery. This includes gum, candy and breath mints.    If you drink alcohol: Stop drinking it the night before surgery.    If your care team tells you to take medicine on the morning of surgery, it's okay to take it with a sip of water.  Preventing infection    Shower or bathe the night before and morning of your surgery. Follow the instructions your clinic gave you. (If no instructions, use regular soap.)    Don't shave or clip hair near your surgery site. We'll remove the hair if needed.    Don't smoke or vape the morning of surgery. You may chew nicotine gum up to 2 hours before surgery. A nicotine patch is okay.  ? Note: Some surgeries require you to completely quit smoking and nicotine. Check with your surgeon.    Your care team will make every effort to keep you safe from infection. We will:  ? Clean our hands often with soap and water (or an alcohol-based hand rub).  ? Clean the skin  at your surgery site with a special soap that kills germs.  ? Give you a special gown to keep you warm. (Cold raises the risk of infection.)  ? Wear special hair covers, masks, gowns and gloves during surgery.  ? Give antibiotic medicine, if prescribed. Not all surgeries need antibiotics.  What to bring on the day of surgery    Photo ID and insurance card    Copy of your health care directive, if you have one    Glasses and hearing aides (bring cases)  ? You can't wear contacts during surgery    Inhaler and eye drops, if you use them (tell us about these when you arrive)    CPAP machine or breathing device, if you use them    A few personal items, if spending the night    If you have . . .  ? A pacemaker, ICD (cardiac defibrillator) or other implant: Bring the ID card.  ? An implanted stimulator: Bring the remote control.  ? A legal guardian: Bring a copy of the certified (court-stamped) guardianship papers.  Please remove any jewelry, including body piercings. Leave jewelry and other valuables at home.  If you're going home the day of surgery    You must have a responsible adult drive you home. They should stay with you overnight as well.    If you don't have someone to stay with you, and you aren't safe to go home alone, we may keep you overnight. Insurance often won't pay for this.  After surgery  If it's hard to control your pain or you need more pain medicine, please call your surgeon's office.  Questions?   If you have any questions for your care team, list them here: _________________________________________________________________________________________________________________________________________________________________________ ____________________________________ ____________________________________ ____________________________________  For informational purposes only. Not to replace the advice of your health care provider. Copyright   2003, 2019 [facility]. All rights reserved.  Clinically reviewed by Faye Milner MD. [employer] [id number] - REV 07/21.

## 2022-03-04 NOTE — RESULT ENCOUNTER NOTE
Madelyn Oh,    Attached are your test results.  -Cholesterol levels (LDL,HDL, Triglycerides) are normal.  ADVISE: rechecking in 1 year.   -Kidney function is normal (Cr, GFR), Sodium is normal, Potassium is normal, Calcium is normal, Glucose is normal.   -TSH (thyroid stimulating hormone) level is normal which indicates normal thyroid function.   Please contact us if you have any questions.    Lesly Padilla, CNP

## 2022-03-07 ENCOUNTER — MYC MEDICAL ADVICE (OUTPATIENT)
Dept: FAMILY MEDICINE | Facility: CLINIC | Age: 32
End: 2022-03-07
Payer: COMMERCIAL

## 2022-03-07 NOTE — TELEPHONE ENCOUNTER
Routing to team to assist - Pt referring to pre-op done on 3/3/22. Was this faxed over to surgery center?    Colleen Warren RN, BSN  [facility]

## 2022-03-15 ENCOUNTER — VIRTUAL VISIT (OUTPATIENT)
Dept: SLEEP MEDICINE | Facility: CLINIC | Age: 32
End: 2022-03-15
Payer: COMMERCIAL

## 2022-03-15 VITALS — HEIGHT: 72 IN | WEIGHT: 170 LBS | BODY MASS INDEX: 23.03 KG/M2

## 2022-03-15 DIAGNOSIS — G47.00 INSOMNIA, UNSPECIFIED TYPE: Primary | ICD-10-CM

## 2022-03-15 PROCEDURE — 99214 OFFICE O/P EST MOD 30 MIN: CPT | Mod: GT | Performed by: INTERNAL MEDICINE

## 2022-03-15 ASSESSMENT — SLEEP AND FATIGUE QUESTIONNAIRES
HOW LIKELY ARE YOU TO NOD OFF OR FALL ASLEEP WHILE SITTING AND TALKING TO SOMEONE: SLIGHT CHANCE OF DOZING
HOW LIKELY ARE YOU TO NOD OFF OR FALL ASLEEP WHILE LYING DOWN TO REST IN THE AFTERNOON WHEN CIRCUMSTANCES PERMIT: SLIGHT CHANCE OF DOZING
HOW LIKELY ARE YOU TO NOD OFF OR FALL ASLEEP WHEN YOU ARE A PASSENGER IN A CAR FOR AN HOUR WITHOUT A BREAK: SLIGHT CHANCE OF DOZING
HOW LIKELY ARE YOU TO NOD OFF OR FALL ASLEEP WHILE SITTING QUIETLY AFTER LUNCH WITHOUT ALCOHOL: SLIGHT CHANCE OF DOZING
HOW LIKELY ARE YOU TO NOD OFF OR FALL ASLEEP IN A CAR, WHILE STOPPED FOR A FEW MINUTES IN TRAFFIC: WOULD NEVER DOZE
HOW LIKELY ARE YOU TO NOD OFF OR FALL ASLEEP WHILE SITTING AND READING: SLIGHT CHANCE OF DOZING
HOW LIKELY ARE YOU TO NOD OFF OR FALL ASLEEP WHILE SITTING INACTIVE IN A PUBLIC PLACE: WOULD NEVER DOZE
HOW LIKELY ARE YOU TO NOD OFF OR FALL ASLEEP WHILE WATCHING TV: SLIGHT CHANCE OF DOZING

## 2022-03-15 ASSESSMENT — PAIN SCALES - GENERAL: PAINLEVEL: SEVERE PAIN (6)

## 2022-03-15 NOTE — PROGRESS NOTES
Tapan is a 31 year old who is being evaluated via a billable video visit.      How would you like to obtain your AVS? MyChart  If the video visit is dropped, the invitation should be resent by: Send to e-mail at: [email or web address]  Will anyone else be joining your video visit? Britta Padilla    Video Start Time: 10:06 AM    Video-Visit Details    Type of service:  Video Visit    Video End Time:10:26 AM    Originating Location (pt. Location): Home    Distant Location (provider location):  [facility]     Platform used for Video Visit: [employer]     Home Sleep Apnea Testing Results Visit:    Chief Complaint   Patient presents with     Video Visit     sleep study results       Julio Oh is a 31 year old male who had Home Sleep Apnea Testing.  He presented 6/2020 with witnessed apneas, mild/occasional snoring, fatigue/excessive daytime sleepiness (ESS 6).  - Insomnia.  Sleep onset difficulties due to anxiety/overactive brain.      Estimated body mass index is 23.06 kg/m  as calculated from the following:    Height as of this encounter: 1.829 m (6').    Weight as of this encounter: 77.1 kg (170 lb).  Total score - [place]: 6 (3/15/2022  9:49 AM)   NICK Total Score: 14    Home Sleep Apnea Testing - 2/22/22: 170 lbs 0 oz: Obstructive AHI 4.6/hr. Central AHI 5.7 Supine AHI 8.2/hr.   Oxygen Xavier of 84%.  Baseline 98%.  Sp02 =< 88% for 0.5 minutes  He slept on his back (55%), prone (0%), left (0%) and right (44%) sides.   Analysis time: 475 minutes.     Signal quality of Oxymeter 95% Good  Nasal Cannula 100% Good  RIP belts 100% Good.         Home Sleep Apnea Testing was reviewed in detail today with Julio and a copy given to him for his records.        Past medical/surgical history, family history, social history, medications and allergies were reviewed.      Ht 1.829 m (6')   Wt 77.1 kg (170 lb)   BMI 23.06 kg/m      Impression/Plan:    No evidence of severe Obstructive Sleep  Apnea.  HSAT has a false negative rate of 20% for all comers  - Elect no further evaluation at this time    Sleep onset difficulties    Suspect psychophysiologic insomnia   - Read the book Say Good Night To Insomnia        I spent 20 minutes with patient including counseling, and 5 minutes with chart review, and documentation **

## 2022-03-16 ENCOUNTER — TRANSFERRED RECORDS (OUTPATIENT)
Dept: HEALTH INFORMATION MANAGEMENT | Facility: CLINIC | Age: 32
End: 2022-03-16
Payer: COMMERCIAL

## 2022-05-03 ENCOUNTER — TRANSFERRED RECORDS (OUTPATIENT)
Dept: HEALTH INFORMATION MANAGEMENT | Facility: CLINIC | Age: 32
End: 2022-05-03
Payer: COMMERCIAL

## 2022-06-16 ENCOUNTER — TRANSFERRED RECORDS (OUTPATIENT)
Dept: HEALTH INFORMATION MANAGEMENT | Facility: CLINIC | Age: 32
End: 2022-06-16

## 2022-06-24 DIAGNOSIS — F51.01 PRIMARY INSOMNIA: ICD-10-CM

## 2022-06-24 RX ORDER — TRAZODONE HYDROCHLORIDE 50 MG/1
TABLET, FILM COATED ORAL
Qty: 90 TABLET | Refills: 0 | Status: SHIPPED | OUTPATIENT
Start: 2022-06-24 | End: 2022-10-03

## 2022-07-18 DIAGNOSIS — F41.1 GAD (GENERALIZED ANXIETY DISORDER): ICD-10-CM

## 2022-07-18 DIAGNOSIS — F33.1 MODERATE EPISODE OF RECURRENT MAJOR DEPRESSIVE DISORDER (H): ICD-10-CM

## 2022-07-18 RX ORDER — PAROXETINE 30 MG/1
30 TABLET, FILM COATED ORAL EVERY MORNING
Qty: 30 TABLET | Refills: 0 | Status: SHIPPED | OUTPATIENT
Start: 2022-07-18 | End: 2022-08-22

## 2022-07-18 NOTE — TELEPHONE ENCOUNTER
30 day refill sent, due for virtual visit for refills  PATRICK Quiroz, NP-C  [facility]

## 2022-08-22 DIAGNOSIS — F33.1 MODERATE EPISODE OF RECURRENT MAJOR DEPRESSIVE DISORDER (H): ICD-10-CM

## 2022-08-22 DIAGNOSIS — F41.1 GAD (GENERALIZED ANXIETY DISORDER): ICD-10-CM

## 2022-08-22 RX ORDER — PAROXETINE 30 MG/1
30 TABLET, FILM COATED ORAL EVERY MORNING
Qty: 30 TABLET | Refills: 0 | Status: SHIPPED | OUTPATIENT
Start: 2022-08-22 | End: 2022-10-21

## 2022-10-03 ENCOUNTER — MYC REFILL (OUTPATIENT)
Dept: FAMILY MEDICINE | Facility: CLINIC | Age: 32
End: 2022-10-03

## 2022-10-03 DIAGNOSIS — F33.1 MODERATE EPISODE OF RECURRENT MAJOR DEPRESSIVE DISORDER (H): ICD-10-CM

## 2022-10-03 DIAGNOSIS — F41.1 GAD (GENERALIZED ANXIETY DISORDER): ICD-10-CM

## 2022-10-03 DIAGNOSIS — F51.01 PRIMARY INSOMNIA: ICD-10-CM

## 2022-10-03 RX ORDER — PAROXETINE 30 MG/1
TABLET, FILM COATED ORAL
Qty: 30 TABLET | Refills: 0 | OUTPATIENT
Start: 2022-10-03

## 2022-10-03 RX ORDER — PAROXETINE 30 MG/1
30 TABLET, FILM COATED ORAL EVERY MORNING
Qty: 30 TABLET | Refills: 0 | Status: CANCELLED | OUTPATIENT
Start: 2022-10-03

## 2022-10-03 RX ORDER — TRAZODONE HYDROCHLORIDE 50 MG/1
TABLET, FILM COATED ORAL
Qty: 90 TABLET | Refills: 0 | Status: SHIPPED | OUTPATIENT
Start: 2022-10-03 | End: 2023-01-23

## 2022-10-03 NOTE — TELEPHONE ENCOUNTER
Duplicate    Refill denied to the pharmacy.   If patient schedules an appointment we can provide a jazzy refill.

## 2022-10-09 ENCOUNTER — HEALTH MAINTENANCE LETTER (OUTPATIENT)
Age: 32
End: 2022-10-09

## 2022-10-21 ENCOUNTER — VIRTUAL VISIT (OUTPATIENT)
Dept: FAMILY MEDICINE | Facility: CLINIC | Age: 32
End: 2022-10-21
Payer: COMMERCIAL

## 2022-10-21 DIAGNOSIS — F33.1 MODERATE EPISODE OF RECURRENT MAJOR DEPRESSIVE DISORDER (H): Primary | ICD-10-CM

## 2022-10-21 DIAGNOSIS — F41.1 GAD (GENERALIZED ANXIETY DISORDER): ICD-10-CM

## 2022-10-21 PROCEDURE — 99213 OFFICE O/P EST LOW 20 MIN: CPT | Mod: GT | Performed by: FAMILY MEDICINE

## 2022-10-21 RX ORDER — LORAZEPAM 1 MG/1
TABLET ORAL
Qty: 15 TABLET | Refills: 2 | Status: SHIPPED | OUTPATIENT
Start: 2022-10-21 | End: 2023-04-26

## 2022-10-21 RX ORDER — PAROXETINE 30 MG/1
30 TABLET, FILM COATED ORAL EVERY MORNING
Qty: 90 TABLET | Refills: 1 | Status: SHIPPED | OUTPATIENT
Start: 2022-10-21 | End: 2023-05-26

## 2022-10-21 ASSESSMENT — PATIENT HEALTH QUESTIONNAIRE - PHQ9: SUM OF ALL RESPONSES TO PHQ QUESTIONS 1-9: 6

## 2022-10-21 ASSESSMENT — ENCOUNTER SYMPTOMS: NERVOUS/ANXIOUS: 1

## 2022-10-21 NOTE — PROGRESS NOTES
Tapan is a 31 year old who is being evaluated via a billable video visit.      How would you like to obtain your AVS? MyChart  If the video visit is dropped, the invitation should be resent by: Text to cell phone: [phone]  Will anyone else be joining your video visit? No          Assessment & Plan     Moderate episode of recurrent major depressive disorder (H)  Stable on current regimen.  Continue same plan and routine follow-up.   - PARoxetine (PAXIL) 30 MG tablet; Take 1 tablet (30 mg) by mouth every morning    OCTAVIO (generalized anxiety disorder)  Stable on current regimen.  Continue same plan and routine follow-up.   - PARoxetine (PAXIL) 30 MG tablet; Take 1 tablet (30 mg) by mouth every morning  - LORazepam (ATIVAN) 1 MG tablet; TAKE 0.5-1 TABLET BY MOUTH THREE TIMES A DAY AS NEEDED FOR ANXIETY         Depression Screening Follow Up    PHQ 10/21/2022   PHQ-9 Total Score 6   Q9: Thoughts of better off dead/self-harm past 2 weeks Several days   F/U: Thoughts of suicide or self-harm -   F/U: Safety concerns -     Last PHQ-9 10/21/2022   1.  Little interest or pleasure in doing things 0   2.  Feeling down, depressed, or hopeless 1   3.  Trouble falling or staying asleep, or sleeping too much 1   4.  Feeling tired or having little energy 1   5.  Poor appetite or overeating 0   6.  Feeling bad about yourself 1   7.  Trouble concentrating 0   8.  Moving slowly or restless 1   Q9: Thoughts of better off dead/self-harm past 2 weeks 1   PHQ-9 Total Score 6   Difficulty at work, home, or with people Somewhat difficult   In the past two weeks have you had thoughts of suicide or self harm? -   Do you have concerns about your personal safety or the safety of others? -         See Patient Instructions    Return in about 6 months (around 4/21/2023) for Follow up of Chronic Issues, In Office or Video.    Jenifer Boss MD  [facility]    Nadine Phelan is a 31 year old, presenting for the  following health issues:  Insomnia and Anxiety      Anxiety    History of Present Illness       Reason for visit:  Medication refill    He eats 4 or more servings of fruits and vegetables daily.He consumes 0 sweetened beverage(s) daily.He exercises with enough effort to increase his heart rate 60 or more minutes per day.  He exercises with enough effort to increase his heart rate 6 days per week.   He is taking medications regularly.       Insomnia  Onset/Duration: trazodone prescribed for over ten years  Description:   Frequency of insomnia:  None with medication  Time to fall asleep (sleep latency): 15 minutes  Middle of night awakening:  No  Early morning awakening:  YES, infrequently  Progression of Symptoms:  same  Accompanying Signs & Symptoms:  Daytime sleepiness/napping: YES- occasional nap  Excessive snoring/apnea: No  Restless legs: No  Waking to urinate: YES  Chronic pain:  No  Depression symptoms (if yes, do PHQ9): YES  Anxiety symptoms (if yes, do OCTAVIO-7): No  History:  Prior Insomnia: YES  New stressful situation: recent wedding, considering a career change  Precipitating factors:   Caffeine intake: YES, not afer 2pm  OTC decongestants: No  Any new medications: No  Alleviating factors:  Self medicating (alcohol, etc.):  No  Stress-reduction (exercise, yoga, meditation etc): YES  Therapies tried and outcome: caffeine avoidance      Anxiety Follow-Up    How are you doing with your anxiety since your last visit? No change    Are you having other symptoms that might be associated with anxiety? No    Have you had a significant life event? OTHER: considering career change     Are you feeling depressed? No    Do you have any concerns with your use of alcohol or other drugs? No    Social History     Tobacco Use     Smoking status: Never     Smokeless tobacco: Never     Tobacco comments:     no nicotine exposure   Vaping Use     Vaping Use: Never used   Substance Use Topics     Alcohol use: No     Drug use: No      OCTAVIO-7 SCORE 8/27/2019 2/10/2021 11/15/2021   Total Score - - -   Total Score - - 3 (minimal anxiety)   Total Score 5 1 3     PHQ 8/27/2019 2/10/2021 10/21/2022   PHQ-9 Total Score 4 0 6   Q9: Thoughts of better off dead/self-harm past 2 weeks Not at all Not at all Several days   F/U: Thoughts of suicide or self-harm - - -   F/U: Safety concerns - - -       Video visit with patient today in follow-up of depression and anxiety.  Generally doing well overall.  Some life stressors but he is handling.      Review of Systems   Psychiatric/Behavioral: The patient is nervous/anxious.    All other systems reviewed and are negative.     Constitutional, HEENT, cardiovascular, pulmonary, gi and gu systems are negative, except as otherwise noted.      Objective           Vitals:  No vitals were obtained today due to virtual visit.    Physical Exam   GENERAL: Healthy, alert and no distress  EYES: Eyes grossly normal to inspection.  No discharge or erythema, or obvious scleral/conjunctival abnormalities.  RESP: No audible wheeze, cough, or visible cyanosis.  No visible retractions or increased work of breathing.    SKIN: Visible skin clear. No significant rash, abnormal pigmentation or lesions.  NEURO: Cranial nerves grossly intact.  Mentation and speech appropriate for age.  PSYCH: Mentation appears normal, affect normal/bright, judgement and insight intact, normal speech and appearance well-groomed.    Past labs reviewed with the patient.             Video-Visit Details    Video Start Time: 0959    Type of service:  Video Visit    Video End Time:1006    Originating Location (pt. Location): Home        Distant Location (provider location):  Off-site    Platform used for Video Visit: [employer]

## 2022-11-01 ENCOUNTER — TRANSFERRED RECORDS (OUTPATIENT)
Dept: HEALTH INFORMATION MANAGEMENT | Facility: CLINIC | Age: 32
End: 2022-11-01

## 2023-01-04 ENCOUNTER — TRANSFERRED RECORDS (OUTPATIENT)
Dept: HEALTH INFORMATION MANAGEMENT | Facility: CLINIC | Age: 33
End: 2023-01-04

## 2023-01-09 ENCOUNTER — E-VISIT (OUTPATIENT)
Dept: FAMILY MEDICINE | Facility: CLINIC | Age: 33
End: 2023-01-09
Payer: COMMERCIAL

## 2023-01-09 DIAGNOSIS — F33.1 MODERATE EPISODE OF RECURRENT MAJOR DEPRESSIVE DISORDER (H): Primary | Chronic | ICD-10-CM

## 2023-01-09 PROCEDURE — 99421 OL DIG E/M SVC 5-10 MIN: CPT | Performed by: FAMILY MEDICINE

## 2023-01-09 ASSESSMENT — ANXIETY QUESTIONNAIRES
6. BECOMING EASILY ANNOYED OR IRRITABLE: NEARLY EVERY DAY
GAD7 TOTAL SCORE: 17
3. WORRYING TOO MUCH ABOUT DIFFERENT THINGS: NEARLY EVERY DAY
2. NOT BEING ABLE TO STOP OR CONTROL WORRYING: NEARLY EVERY DAY
GAD7 TOTAL SCORE: 17
4. TROUBLE RELAXING: NEARLY EVERY DAY
1. FEELING NERVOUS, ANXIOUS, OR ON EDGE: NEARLY EVERY DAY
5. BEING SO RESTLESS THAT IT IS HARD TO SIT STILL: SEVERAL DAYS
7. FEELING AFRAID AS IF SOMETHING AWFUL MIGHT HAPPEN: SEVERAL DAYS
7. FEELING AFRAID AS IF SOMETHING AWFUL MIGHT HAPPEN: SEVERAL DAYS
8. IF YOU CHECKED OFF ANY PROBLEMS, HOW DIFFICULT HAVE THESE MADE IT FOR YOU TO DO YOUR WORK, TAKE CARE OF THINGS AT HOME, OR GET ALONG WITH OTHER PEOPLE?: VERY DIFFICULT
GAD7 TOTAL SCORE: 17

## 2023-01-09 ASSESSMENT — PATIENT HEALTH QUESTIONNAIRE - PHQ9
SUM OF ALL RESPONSES TO PHQ QUESTIONS 1-9: 14
SUM OF ALL RESPONSES TO PHQ QUESTIONS 1-9: 14
10. IF YOU CHECKED OFF ANY PROBLEMS, HOW DIFFICULT HAVE THESE PROBLEMS MADE IT FOR YOU TO DO YOUR WORK, TAKE CARE OF THINGS AT HOME, OR GET ALONG WITH OTHER PEOPLE: VERY DIFFICULT

## 2023-01-10 ASSESSMENT — PATIENT HEALTH QUESTIONNAIRE - PHQ9: SUM OF ALL RESPONSES TO PHQ QUESTIONS 1-9: 14

## 2023-01-10 ASSESSMENT — ANXIETY QUESTIONNAIRES: GAD7 TOTAL SCORE: 17

## 2023-01-16 ENCOUNTER — TELEPHONE (OUTPATIENT)
Dept: NURSING | Facility: CLINIC | Age: 33
End: 2023-01-16
Payer: COMMERCIAL

## 2023-01-16 DIAGNOSIS — F41.1 GAD (GENERALIZED ANXIETY DISORDER): Chronic | ICD-10-CM

## 2023-01-16 DIAGNOSIS — F33.1 MODERATE EPISODE OF RECURRENT MAJOR DEPRESSIVE DISORDER (H): Primary | Chronic | ICD-10-CM

## 2023-01-16 NOTE — TELEPHONE ENCOUNTER
Reason for Call:  Other call back     Detailed comments: referral for anxiety and depression     Phone Number Patient can be reached at: Cell number on file:    Telephone Information:   Mobile [phone]       Best Time: anytime    Can we leave a detailed message on this number? YES    Call taken on 1/16/2023 at 3:11 PM by María Hamlin

## 2023-02-27 ENCOUNTER — MYC MEDICAL ADVICE (OUTPATIENT)
Dept: FAMILY MEDICINE | Facility: CLINIC | Age: 33
End: 2023-02-27
Payer: COMMERCIAL

## 2023-02-27 DIAGNOSIS — F33.1 MODERATE EPISODE OF RECURRENT MAJOR DEPRESSIVE DISORDER (H): Primary | Chronic | ICD-10-CM

## 2023-02-27 DIAGNOSIS — H90.2 CONDUCTIVE HEARING LOSS, UNSPECIFIED LATERALITY: ICD-10-CM

## 2023-02-27 DIAGNOSIS — F41.1 GAD (GENERALIZED ANXIETY DISORDER): Chronic | ICD-10-CM

## 2023-02-27 NOTE — LETTER
[address] [address]  Phone: [phone]    March 1, 2023        Julio Oh  [address] [place] [address]          To whom it may concern:    RE: Julio Oh    I am the primary physician for Mr. Julio Oh.  He is under my care for the following chronic medical conditions which can be considered disabling:    Chronic depression  Generalized anxiety disorder  Chronic conductive hearing loss      Please contact me for questions or concerns.      Sincerely,        Jenifer Boss MD

## 2023-03-25 ENCOUNTER — HEALTH MAINTENANCE LETTER (OUTPATIENT)
Age: 33
End: 2023-03-25

## 2023-03-30 ENCOUNTER — MYC MEDICAL ADVICE (OUTPATIENT)
Dept: FAMILY MEDICINE | Facility: CLINIC | Age: 33
End: 2023-03-30
Payer: COMMERCIAL

## 2023-04-10 ENCOUNTER — VIRTUAL VISIT (OUTPATIENT)
Dept: FAMILY MEDICINE | Facility: CLINIC | Age: 33
End: 2023-04-10
Payer: COMMERCIAL

## 2023-04-10 DIAGNOSIS — Z13.220 SCREENING CHOLESTEROL LEVEL: ICD-10-CM

## 2023-04-10 DIAGNOSIS — F41.1 GAD (GENERALIZED ANXIETY DISORDER): ICD-10-CM

## 2023-04-10 DIAGNOSIS — R61 NIGHT SWEATS: ICD-10-CM

## 2023-04-10 DIAGNOSIS — R53.83 FATIGUE, UNSPECIFIED TYPE: ICD-10-CM

## 2023-04-10 DIAGNOSIS — F33.1 MODERATE EPISODE OF RECURRENT MAJOR DEPRESSIVE DISORDER (H): Primary | ICD-10-CM

## 2023-04-10 PROCEDURE — 99214 OFFICE O/P EST MOD 30 MIN: CPT | Mod: VID | Performed by: FAMILY MEDICINE

## 2023-04-10 RX ORDER — PAROXETINE 20 MG/1
20 TABLET, FILM COATED ORAL DAILY
Qty: 90 TABLET | Refills: 0 | Status: SHIPPED | OUTPATIENT
Start: 2023-04-10 | End: 2024-03-29

## 2023-04-10 ASSESSMENT — PATIENT HEALTH QUESTIONNAIRE - PHQ9
10. IF YOU CHECKED OFF ANY PROBLEMS, HOW DIFFICULT HAVE THESE PROBLEMS MADE IT FOR YOU TO DO YOUR WORK, TAKE CARE OF THINGS AT HOME, OR GET ALONG WITH OTHER PEOPLE: SOMEWHAT DIFFICULT
SUM OF ALL RESPONSES TO PHQ QUESTIONS 1-9: 11
SUM OF ALL RESPONSES TO PHQ QUESTIONS 1-9: 11

## 2023-04-10 NOTE — PROGRESS NOTES
Tapan is a 32 year old who is being evaluated via a billable video visit.      How would you like to obtain your AVS? MyChart  If the video visit is dropped, the invitation should be resent by: Text to cell phone: [phone]  Will anyone else be joining your video visit? No          Assessment & Plan     Moderate episode of recurrent major depressive disorder (H)  We reviewed PHQ-9 score and patient says it is more of a generalized dysthymia and stress.  Has a lot going on including looking for a new job, etc.  But we talked about the potential for medication adjustments and we will set this aside until we see the lab work as below.  Could consider adding another agent or perhaps even a mood stabilizer.  - PARoxetine (PAXIL) 20 MG tablet; Take 1 tablet (20 mg) by mouth daily With 30 mg to make 50 mg daily    OCTAVIO (generalized anxiety disorder)  As above  - PARoxetine (PAXIL) 20 MG tablet; Take 1 tablet (20 mg) by mouth daily With 30 mg to make 50 mg daily    Fatigue, unspecified type  Patient has been noticing some overall fatigue.  Just does not feel himself.  Low energy levels.  Low sex drive.  Had talked to some friends at his gym who mentioned looking into hormone levels so we talked about screening in general.  - Testosterone total; Future  - Comprehensive metabolic panel; Future  - TSH with free T4 reflex; Future  - CBC with platelets; Future  - Erythrocyte sedimentation rate auto; Future    Night sweats  Has been noting night sweats for some time.  No illness symptoms associated but this does come and go and can be fairly significant.  - Testosterone total; Future  - Erythrocyte sedimentation rate auto; Future    Screening cholesterol level    - Lipid panel reflex to direct LDL Fasting; Future         Depression Screening Follow Up        4/10/2023     6:52 AM   PHQ   PHQ-9 Total Score 11   Q9: Thoughts of better off dead/self-harm past 2 weeks Several days   F/U: Thoughts of suicide or self-harm No   F/U:  Safety concerns No         4/10/2023     6:52 AM   Last PHQ-9   1.  Little interest or pleasure in doing things 1   2.  Feeling down, depressed, or hopeless 2   3.  Trouble falling or staying asleep, or sleeping too much 2   4.  Feeling tired or having little energy 1   5.  Poor appetite or overeating 1   6.  Feeling bad about yourself 2   7.  Trouble concentrating 1   8.  Moving slowly or restless 0   Q9: Thoughts of better off dead/self-harm past 2 weeks 1   PHQ-9 Total Score 11   In the past two weeks have you had thoughts of suicide or self harm? No   Do you have concerns about your personal safety or the safety of others? No         See Patient Instructions    Jenifer Boss MD  [facility] KIMBERLY Phelan is a 32 year old, presenting for the following health issues:  Hormones (Requesting to check hormone levels)        4/10/2023     9:07 AM   Additional Questions   Roomed by Ric GRIFFIN     History of Present Illness       Reason for visit:  Testing hormone levels    He eats 2-3 servings of fruits and vegetables daily.He consumes 0 sweetened beverage(s) daily.He exercises with enough effort to increase his heart rate 30 to 60 minutes per day.  He exercises with enough effort to increase his heart rate 5 days per week.   He is taking medications regularly.    Today's PHQ-9         PHQ-9 Total Score: 11    PHQ-9 Q9 Thoughts of better off dead/self-harm past 2 weeks :   Several days  Thoughts of suicide or self harm: (P) No  Self-harm Plan:     Self-harm Action:       Safety concerns for self or others: (P) No    How difficult have these problems made it for you to do your work, take care of things at home, or get along with other people: Somewhat difficult       Video visit with patient today in follow-up of depression but want to talk about energy levels and night sweats.      Review of Systems   Constitutional, HEENT, cardiovascular, pulmonary, gi and gu systems are negative,  except as otherwise noted.      Objective           Vitals:  No vitals were obtained today due to virtual visit.    Physical Exam   GENERAL: Healthy, alert and no distress  EYES: Eyes grossly normal to inspection.  No discharge or erythema, or obvious scleral/conjunctival abnormalities.  RESP: No audible wheeze, cough, or visible cyanosis.  No visible retractions or increased work of breathing.    SKIN: Visible skin clear. No significant rash, abnormal pigmentation or lesions.  NEURO: Cranial nerves grossly intact.  Mentation and speech appropriate for age.  PSYCH: Mentation appears normal, affect normal/bright, judgement and insight intact, normal speech and appearance well-groomed.    Past labs reviewed with the patient.             Video-Visit Details    Type of service:  Video Visit     Originating Location (pt. Location): Home    Distant Location (provider location):  Off-site  Platform used for Video Visit: [employer]

## 2023-04-13 ENCOUNTER — LAB (OUTPATIENT)
Dept: LAB | Facility: CLINIC | Age: 33
End: 2023-04-13
Payer: COMMERCIAL

## 2023-04-13 DIAGNOSIS — R61 NIGHT SWEATS: ICD-10-CM

## 2023-04-13 DIAGNOSIS — Z13.220 SCREENING CHOLESTEROL LEVEL: ICD-10-CM

## 2023-04-13 DIAGNOSIS — R53.83 FATIGUE, UNSPECIFIED TYPE: ICD-10-CM

## 2023-04-13 LAB
ALBUMIN SERPL-MCNC: 4.1 G/DL (ref 3.4–5)
ALP SERPL-CCNC: 73 U/L (ref 40–150)
ALT SERPL W P-5'-P-CCNC: 27 U/L (ref 0–70)
ANION GAP SERPL CALCULATED.3IONS-SCNC: 2 MMOL/L (ref 3–14)
AST SERPL W P-5'-P-CCNC: 20 U/L (ref 0–45)
BILIRUB SERPL-MCNC: 0.4 MG/DL (ref 0.2–1.3)
BUN SERPL-MCNC: 18 MG/DL (ref 7–30)
CALCIUM SERPL-MCNC: 9.2 MG/DL (ref 8.5–10.1)
CHLORIDE BLD-SCNC: 105 MMOL/L (ref 94–109)
CHOLEST SERPL-MCNC: 163 MG/DL
CO2 SERPL-SCNC: 29 MMOL/L (ref 20–32)
CREAT SERPL-MCNC: 1.02 MG/DL (ref 0.66–1.25)
ERYTHROCYTE [DISTWIDTH] IN BLOOD BY AUTOMATED COUNT: 12.9 % (ref 10–15)
ERYTHROCYTE [SEDIMENTATION RATE] IN BLOOD BY WESTERGREN METHOD: 7 MM/HR (ref 0–15)
FASTING STATUS PATIENT QL REPORTED: YES
GFR SERPL CREATININE-BSD FRML MDRD: >90 ML/MIN/1.73M2
GLUCOSE BLD-MCNC: 102 MG/DL (ref 70–99)
HCT VFR BLD AUTO: 42.5 % (ref 40–53)
HDLC SERPL-MCNC: 62 MG/DL
HGB BLD-MCNC: 14.2 G/DL (ref 13.3–17.7)
LDLC SERPL CALC-MCNC: 94 MG/DL
MCH RBC QN AUTO: 29.7 PG (ref 26.5–33)
MCHC RBC AUTO-ENTMCNC: 33.4 G/DL (ref 31.5–36.5)
MCV RBC AUTO: 89 FL (ref 78–100)
NONHDLC SERPL-MCNC: 101 MG/DL
PLATELET # BLD AUTO: 190 10E3/UL (ref 150–450)
POTASSIUM BLD-SCNC: 4.5 MMOL/L (ref 3.4–5.3)
PROT SERPL-MCNC: 7.9 G/DL (ref 6.8–8.8)
RBC # BLD AUTO: 4.78 10E6/UL (ref 4.4–5.9)
SODIUM SERPL-SCNC: 136 MMOL/L (ref 133–144)
TRIGL SERPL-MCNC: 37 MG/DL
TSH SERPL DL<=0.005 MIU/L-ACNC: 0.85 MU/L (ref 0.4–4)
WBC # BLD AUTO: 9.5 10E3/UL (ref 4–11)

## 2023-04-13 PROCEDURE — 80053 COMPREHEN METABOLIC PANEL: CPT

## 2023-04-13 PROCEDURE — 36415 COLL VENOUS BLD VENIPUNCTURE: CPT

## 2023-04-13 PROCEDURE — 84443 ASSAY THYROID STIM HORMONE: CPT

## 2023-04-13 PROCEDURE — 85652 RBC SED RATE AUTOMATED: CPT

## 2023-04-13 PROCEDURE — 85027 COMPLETE CBC AUTOMATED: CPT

## 2023-04-13 PROCEDURE — 80061 LIPID PANEL: CPT

## 2023-04-13 PROCEDURE — 84403 ASSAY OF TOTAL TESTOSTERONE: CPT

## 2023-04-16 NOTE — RESULT ENCOUNTER NOTE
Tapan,  Testosterone will take another day or so, but so far everything looks great.  GABY Boss M.D.

## 2023-04-19 LAB — TESTOST SERPL-MCNC: 541 NG/DL (ref 240–950)

## 2023-04-26 DIAGNOSIS — F41.1 GAD (GENERALIZED ANXIETY DISORDER): ICD-10-CM

## 2023-04-26 RX ORDER — LORAZEPAM 1 MG/1
TABLET ORAL
Qty: 15 TABLET | Refills: 2 | Status: SHIPPED | OUTPATIENT
Start: 2023-04-26 | End: 2023-10-03

## 2023-05-25 DIAGNOSIS — F41.1 GAD (GENERALIZED ANXIETY DISORDER): ICD-10-CM

## 2023-05-25 DIAGNOSIS — F33.1 MODERATE EPISODE OF RECURRENT MAJOR DEPRESSIVE DISORDER (H): ICD-10-CM

## 2023-05-26 RX ORDER — PAROXETINE 30 MG/1
30 TABLET, FILM COATED ORAL EVERY MORNING
Qty: 90 TABLET | Refills: 1 | Status: SHIPPED | OUTPATIENT
Start: 2023-05-26 | End: 2024-03-29

## 2023-06-20 ENCOUNTER — MYC MEDICAL ADVICE (OUTPATIENT)
Dept: FAMILY MEDICINE | Facility: CLINIC | Age: 33
End: 2023-06-20
Payer: COMMERCIAL

## 2023-10-02 ENCOUNTER — OFFICE VISIT (OUTPATIENT)
Dept: FAMILY MEDICINE | Facility: CLINIC | Age: 33
End: 2023-10-02
Payer: COMMERCIAL

## 2023-10-02 VITALS
RESPIRATION RATE: 12 BRPM | BODY MASS INDEX: 26.56 KG/M2 | WEIGHT: 196.1 LBS | DIASTOLIC BLOOD PRESSURE: 77 MMHG | TEMPERATURE: 97.2 F | OXYGEN SATURATION: 98 % | SYSTOLIC BLOOD PRESSURE: 123 MMHG | HEART RATE: 54 BPM | HEIGHT: 72 IN

## 2023-10-02 DIAGNOSIS — R23.8 SKIN IRRITATION: ICD-10-CM

## 2023-10-02 DIAGNOSIS — F41.1 GAD (GENERALIZED ANXIETY DISORDER): ICD-10-CM

## 2023-10-02 DIAGNOSIS — F33.1 MODERATE EPISODE OF RECURRENT MAJOR DEPRESSIVE DISORDER (H): Primary | ICD-10-CM

## 2023-10-02 PROCEDURE — 99214 OFFICE O/P EST MOD 30 MIN: CPT | Performed by: FAMILY MEDICINE

## 2023-10-02 RX ORDER — TRIAMCINOLONE ACETONIDE 1 MG/G
CREAM TOPICAL 3 TIMES DAILY PRN
Qty: 30 G | Refills: 0 | Status: SHIPPED | OUTPATIENT
Start: 2023-10-02 | End: 2024-02-08

## 2023-10-02 RX ORDER — DULOXETIN HYDROCHLORIDE 30 MG/1
30 CAPSULE, DELAYED RELEASE ORAL 2 TIMES DAILY
Qty: 30 CAPSULE | Refills: 0 | Status: SHIPPED | OUTPATIENT
Start: 2023-10-02 | End: 2023-10-25

## 2023-10-02 ASSESSMENT — PATIENT HEALTH QUESTIONNAIRE - PHQ9
10. IF YOU CHECKED OFF ANY PROBLEMS, HOW DIFFICULT HAVE THESE PROBLEMS MADE IT FOR YOU TO DO YOUR WORK, TAKE CARE OF THINGS AT HOME, OR GET ALONG WITH OTHER PEOPLE: EXTREMELY DIFFICULT
SUM OF ALL RESPONSES TO PHQ QUESTIONS 1-9: 17
SUM OF ALL RESPONSES TO PHQ QUESTIONS 1-9: 17

## 2023-10-02 ASSESSMENT — PAIN SCALES - GENERAL: PAINLEVEL: MODERATE PAIN (4)

## 2023-10-02 NOTE — PROGRESS NOTES
Assessment & Plan     Moderate episode of recurrent major depressive disorder (H)  Issues with depression well-known to me.  Currently on 50 mg of paroxetine and just does not seem to be doing enough.  Lots of social stressors as well.  We have used Wellbutrin and Effexor at least temporarily in the past with mixed results.  So I want him to continue with the paroxetine and we will add a touch of duloxetine.  Talked about potential interactions.  Contact me in a couple of weeks and we can decide whether to continue or make some further changes  - DULoxetine (CYMBALTA) 30 MG capsule; Take 1 capsule (30 mg) by mouth 2 times daily    OCTAVIO (generalized anxiety disorder)  As above  - DULoxetine (CYMBALTA) 30 MG capsule; Take 1 capsule (30 mg) by mouth 2 times daily    Skin irritation  Has a history by patient's description of what sounds like a pilonidal cyst.  Recently has been having some soreness in the area and wanted to have it looked at.  There is quite a bit of erythema into the gluteal fold itself but I do not discern any particular mass.  So I think this is more of a chafing issue we can try some topical cortisone to heal it up.  - triamcinolone (KENALOG) 0.1 % external cream; Apply topically 3 times daily as needed for irritation       BMI:   Estimated body mass index is 26.6 kg/m  as calculated from the following:    Height as of this encounter: 1.829 m (6').    Weight as of this encounter: 89 kg (196 lb 1.6 oz).       Depression Screening Follow Up        10/2/2023     8:47 AM   PHQ   PHQ-9 Total Score 17   Q9: Thoughts of better off dead/self-harm past 2 weeks Several days   F/U: Thoughts of suicide or self-harm No   F/U: Safety concerns No         10/2/2023     8:47 AM   Last PHQ-9   1.  Little interest or pleasure in doing things 2   2.  Feeling down, depressed, or hopeless 2   3.  Trouble falling or staying asleep, or sleeping too much 2   4.  Feeling tired or having little energy 2   5.  Poor appetite  or overeating 2   6.  Feeling bad about yourself 2   7.  Trouble concentrating 3   8.  Moving slowly or restless 1   Q9: Thoughts of better off dead/self-harm past 2 weeks 1   PHQ-9 Total Score 17   In the past two weeks have you had thoughts of suicide or self harm? No   Do you have concerns about your personal safety or the safety of others? No         Follow Up    Follow Up Actions Taken      Discussed the following ways the patient can remain in a safe environment:    See Patient Instructions    Jenifer Boss MD  [facility] KIMBERLY Phelan is a 32 year old, presenting for the following health issues:  Mental Health Problem (Discuss mental health issues . ) and Cyst (Cyst on tail bone)        10/2/2023     1:18 PM   Additional Questions   Roomed by Franca GILLETTE   Accompanied by self       History of Present Illness       Back Pain:  He presents for follow up of back pain. Patient's back pain is a recurring problem.  Location of back pain:  Left buttock  Description of back pain: dull ache and sharp  Back pain spreads: right buttocks    Since patient first noticed back pain, pain is: unchanged  Does back pain interfere with his job:  Yes       He eats 4 or more servings of fruits and vegetables daily.He consumes 0 sweetened beverage(s) daily.He exercises with enough effort to increase his heart rate 30 to 60 minutes per day.  He exercises with enough effort to increase his heart rate 6 days per week.   He is taking medications regularly.         Concern - cyst on tail   Onset: on going for a couple years   Description: painful with the cyst is full  Intensity: mild  Progression of Symptoms:  same  Accompanying Signs & Symptoms: none  Previous history of similar problem: yes   Precipitating factors:        Worsened by: none   Alleviating factors:        Improved by: takes tylenol and ibuprofen for the pain   Therapies tried and outcome: None    Here today in follow-up of depression  and anxiety.  Also want to take a look for possible cyst.      Review of Systems   Constitutional, HEENT, cardiovascular, pulmonary, gi and gu systems are negative, except as otherwise noted.      Objective    /77 (BP Location: Right arm, Patient Position: Sitting, Cuff Size: Adult Large)   Pulse 54   Temp 97.2  F (36.2  C) (Temporal)   Resp 12   Ht 1.829 m (6')   Wt 89 kg (196 lb 1.6 oz)   SpO2 98%   BMI 26.60 kg/m    Body mass index is 26.6 kg/m .  Physical Exam   Alert, pleasant, upbeat, and in no apparent discomfort.  S1 and S2 normal, no murmurs, clicks, gallops or rubs. Regular rate and rhythm. Chest is clear; no wheezes or rales. No edema or JVD.  Quite a bit of erythema in the gluteal fold and perhaps a few little punctations but no mass  Past labs reviewed with the patient.

## 2023-10-12 ENCOUNTER — TRANSFERRED RECORDS (OUTPATIENT)
Dept: HEALTH INFORMATION MANAGEMENT | Facility: CLINIC | Age: 33
End: 2023-10-12
Payer: COMMERCIAL

## 2023-10-24 ENCOUNTER — TRANSFERRED RECORDS (OUTPATIENT)
Dept: HEALTH INFORMATION MANAGEMENT | Facility: CLINIC | Age: 33
End: 2023-10-24
Payer: COMMERCIAL

## 2023-10-25 ENCOUNTER — TELEPHONE (OUTPATIENT)
Dept: FAMILY MEDICINE | Facility: CLINIC | Age: 33
End: 2023-10-25
Payer: COMMERCIAL

## 2023-10-25 NOTE — TELEPHONE ENCOUNTER
I do not think that prescription came from the.  The last time I sent in a 20 mg prescription was in April and that is not the instructions.  He is currently on 30 mg daily but we are talking about adding another 20 mg to make it 50 mg total.  But I did not send in a prescription recently.  Could this have been written long ago?

## 2023-10-25 NOTE — TELEPHONE ENCOUNTER
"We received an Rx for Paxil 20mg. I am just wondering what strength patient should be taking, the Rx sig is \"take 1 tablet\" and the Rx notes state \"xtaper\". Didn't know if patient should be taking this once daily or should be tapering? Thanks!  "

## 2023-10-25 NOTE — TELEPHONE ENCOUNTER
Called [facility] pharmacy back and notified them of provider's response. They verbalized understanding. They reported that they received a prescription for this from a different provider. They will follow up with their clinic. They had no further questions/concerns at this time.     Lulu Baker, YAYON, RN   [facility]

## 2023-11-29 ENCOUNTER — TRANSFERRED RECORDS (OUTPATIENT)
Dept: HEALTH INFORMATION MANAGEMENT | Facility: CLINIC | Age: 33
End: 2023-11-29
Payer: COMMERCIAL

## 2023-12-10 LAB — PHQ9 SCORE: 7

## 2023-12-13 ENCOUNTER — TRANSFERRED RECORDS (OUTPATIENT)
Dept: HEALTH INFORMATION MANAGEMENT | Facility: CLINIC | Age: 33
End: 2023-12-13
Payer: COMMERCIAL

## 2024-01-04 ENCOUNTER — TRANSFERRED RECORDS (OUTPATIENT)
Dept: HEALTH INFORMATION MANAGEMENT | Facility: CLINIC | Age: 34
End: 2024-01-04
Payer: COMMERCIAL

## 2024-01-12 ENCOUNTER — TRANSFERRED RECORDS (OUTPATIENT)
Dept: HEALTH INFORMATION MANAGEMENT | Facility: CLINIC | Age: 34
End: 2024-01-12
Payer: COMMERCIAL

## 2024-01-30 ENCOUNTER — OFFICE VISIT (OUTPATIENT)
Dept: FAMILY MEDICINE | Facility: CLINIC | Age: 34
End: 2024-01-30
Payer: COMMERCIAL

## 2024-01-30 VITALS
HEIGHT: 71 IN | DIASTOLIC BLOOD PRESSURE: 82 MMHG | OXYGEN SATURATION: 97 % | RESPIRATION RATE: 16 BRPM | WEIGHT: 197.19 LBS | BODY MASS INDEX: 27.6 KG/M2 | HEART RATE: 84 BPM | TEMPERATURE: 98.1 F | SYSTOLIC BLOOD PRESSURE: 130 MMHG

## 2024-01-30 DIAGNOSIS — F39 MOOD DISORDER (H): ICD-10-CM

## 2024-01-30 DIAGNOSIS — F32.1 CURRENT MODERATE EPISODE OF MAJOR DEPRESSIVE DISORDER WITHOUT PRIOR EPISODE (H): ICD-10-CM

## 2024-01-30 DIAGNOSIS — Z13.220 LIPID SCREENING: ICD-10-CM

## 2024-01-30 DIAGNOSIS — G47.00 INSOMNIA, UNSPECIFIED TYPE: ICD-10-CM

## 2024-01-30 DIAGNOSIS — Z13.29 SCREENING FOR ENDOCRINE DISORDER: ICD-10-CM

## 2024-01-30 DIAGNOSIS — F41.1 GAD (GENERALIZED ANXIETY DISORDER): Primary | Chronic | ICD-10-CM

## 2024-01-30 LAB
ALBUMIN SERPL BCG-MCNC: 4.6 G/DL (ref 3.5–5.2)
ALP SERPL-CCNC: 58 U/L (ref 40–150)
ALT SERPL W P-5'-P-CCNC: 19 U/L (ref 0–70)
ANION GAP SERPL CALCULATED.3IONS-SCNC: 9 MMOL/L (ref 7–15)
AST SERPL W P-5'-P-CCNC: 22 U/L (ref 0–45)
BASOPHILS # BLD AUTO: 0 10E3/UL (ref 0–0.2)
BASOPHILS NFR BLD AUTO: 1 %
BILIRUB SERPL-MCNC: 0.4 MG/DL
BUN SERPL-MCNC: 17.4 MG/DL (ref 6–20)
CALCIUM SERPL-MCNC: 9.3 MG/DL (ref 8.6–10)
CHLORIDE SERPL-SCNC: 104 MMOL/L (ref 98–107)
CHOLEST SERPL-MCNC: 174 MG/DL
CREAT SERPL-MCNC: 0.94 MG/DL (ref 0.67–1.17)
DEPRECATED HCO3 PLAS-SCNC: 28 MMOL/L (ref 22–29)
EGFRCR SERPLBLD CKD-EPI 2021: >90 ML/MIN/1.73M2
EOSINOPHIL # BLD AUTO: 0 10E3/UL (ref 0–0.7)
EOSINOPHIL NFR BLD AUTO: 0 %
ERYTHROCYTE [DISTWIDTH] IN BLOOD BY AUTOMATED COUNT: 12.5 % (ref 10–15)
FASTING STATUS PATIENT QL REPORTED: YES
GLUCOSE SERPL-MCNC: 104 MG/DL (ref 70–99)
HBA1C MFR BLD: 5.7 % (ref 0–5.6)
HCT VFR BLD AUTO: 41.7 % (ref 40–53)
HDLC SERPL-MCNC: 57 MG/DL
HGB BLD-MCNC: 14.2 G/DL (ref 13.3–17.7)
IMM GRANULOCYTES # BLD: 0 10E3/UL
IMM GRANULOCYTES NFR BLD: 0 %
LDLC SERPL CALC-MCNC: 107 MG/DL
LYMPHOCYTES # BLD AUTO: 1.3 10E3/UL (ref 0.8–5.3)
LYMPHOCYTES NFR BLD AUTO: 23 %
MCH RBC QN AUTO: 30.1 PG (ref 26.5–33)
MCHC RBC AUTO-ENTMCNC: 34.1 G/DL (ref 31.5–36.5)
MCV RBC AUTO: 89 FL (ref 78–100)
MONOCYTES # BLD AUTO: 0.4 10E3/UL (ref 0–1.3)
MONOCYTES NFR BLD AUTO: 7 %
NEUTROPHILS # BLD AUTO: 3.7 10E3/UL (ref 1.6–8.3)
NEUTROPHILS NFR BLD AUTO: 68 %
NONHDLC SERPL-MCNC: 117 MG/DL
PLATELET # BLD AUTO: 182 10E3/UL (ref 150–450)
POTASSIUM SERPL-SCNC: 4.3 MMOL/L (ref 3.4–5.3)
PROT SERPL-MCNC: 7.4 G/DL (ref 6.4–8.3)
RBC # BLD AUTO: 4.71 10E6/UL (ref 4.4–5.9)
SODIUM SERPL-SCNC: 141 MMOL/L (ref 135–145)
TRIGL SERPL-MCNC: 50 MG/DL
TSH SERPL DL<=0.005 MIU/L-ACNC: 1.77 UIU/ML (ref 0.3–4.2)
VIT B12 SERPL-MCNC: 740 PG/ML (ref 232–1245)
VIT D+METAB SERPL-MCNC: 61 NG/ML (ref 20–50)
WBC # BLD AUTO: 5.4 10E3/UL (ref 4–11)

## 2024-01-30 PROCEDURE — 99214 OFFICE O/P EST MOD 30 MIN: CPT | Performed by: FAMILY MEDICINE

## 2024-01-30 PROCEDURE — 83036 HEMOGLOBIN GLYCOSYLATED A1C: CPT | Performed by: FAMILY MEDICINE

## 2024-01-30 PROCEDURE — 80061 LIPID PANEL: CPT | Performed by: FAMILY MEDICINE

## 2024-01-30 PROCEDURE — 36415 COLL VENOUS BLD VENIPUNCTURE: CPT | Performed by: FAMILY MEDICINE

## 2024-01-30 PROCEDURE — 82607 VITAMIN B-12: CPT | Performed by: FAMILY MEDICINE

## 2024-01-30 PROCEDURE — 84443 ASSAY THYROID STIM HORMONE: CPT | Performed by: FAMILY MEDICINE

## 2024-01-30 PROCEDURE — 80053 COMPREHEN METABOLIC PANEL: CPT | Performed by: FAMILY MEDICINE

## 2024-01-30 PROCEDURE — 84403 ASSAY OF TOTAL TESTOSTERONE: CPT | Performed by: FAMILY MEDICINE

## 2024-01-30 PROCEDURE — 82306 VITAMIN D 25 HYDROXY: CPT | Performed by: FAMILY MEDICINE

## 2024-01-30 PROCEDURE — 85025 COMPLETE CBC W/AUTO DIFF WBC: CPT | Performed by: FAMILY MEDICINE

## 2024-01-30 RX ORDER — BUPROPION HYDROCHLORIDE 150 MG/1
150 TABLET ORAL EVERY MORNING
Qty: 90 TABLET | Refills: 1 | Status: SHIPPED | OUTPATIENT
Start: 2024-01-30 | End: 2024-02-08

## 2024-01-30 ASSESSMENT — ENCOUNTER SYMPTOMS: FATIGUE: 1

## 2024-01-30 ASSESSMENT — PAIN SCALES - GENERAL: PAINLEVEL: NO PAIN (0)

## 2024-01-30 NOTE — PROGRESS NOTES
Assessment & Plan     OCTAVIO (generalized anxiety disorder)  Will consider medication change since lab work was unremarkable except for pre-diabetes. See order for gene testing via MTM   - buPROPion (WELLBUTRIN XL) 150 MG 24 hr tablet; Take 1 tablet (150 mg) by mouth every morning  - Med Therapy Management Referral    Screening for endocrine disorder  - TSH with free T4 reflex; Future  - TSH with free T4 reflex    Insomnia, unspecified type  - Med Therapy Management Referral    Current moderate episode of major depressive disorder without prior episode (H)  We discussed the treatment for anxiety and depression in detail.  The importance of a multi faceted approach in controlling symptoms was reviewed.  The benefits of cognitive behavioral therapy reviewed, benefits of exercise, and stress reduction also discussed.       Duration of treatment is at least 9 months with medication. It may take 3-4 weeks before symptom improvement happens.  Do not stop medication suddenly, medication will need to be tapered off.  Slight increased risk of suicide with SSRI group of medications discussed.       I ended our visit today by discussing the patient's diagnoses and recommended treatment. Please refer to today's diagnoses and orders for further details. I briefly discussed the pathophysiology of these conditions and outlined their expected course. I discussed the warning symptoms and signs that indicate an atypical course that would need urgent or emergent care. I also discussed self care strategies for symptom relief.  Patient voiced complete understanding of plan of care and was in full agreement to proceed. After visit summary discussed and handed to patient.    Common side effects of medications prescribed at this visit were discussed with the patient. Severe side effects, including current applicable black box warnings, were discussed.   - REVIEW OF HEALTH MAINTENANCE PROTOCOL ORDERS  - DEPRESSION ACTION PLAN (DAP)  -  PRIMARY CARE FOLLOW-UP SCHEDULING; Future  - buPROPion (WELLBUTRIN XL) 150 MG 24 hr tablet; Take 1 tablet (150 mg) by mouth every morning  - Med Therapy Management Referral    Mood disorder (H24)  - CBC with platelets and differential; Future  - Comprehensive metabolic panel  - Vitamin D Deficiency; Future  - Vitamin B12; Future  - Hemoglobin A1c; Future  - Testosterone, total; Future  - CBC with platelets and differential  - Vitamin D Deficiency  - Vitamin B12  - Hemoglobin A1c  - Testosterone, total  - Med Therapy Management Referral    Lipid screening  - Lipid panel reflex to direct LDL Fasting; Future  - Lipid panel reflex to direct LDL Fasting        Nadine Phelan is a 33 year old, presenting for the following health issues:  Depression and Fatigue      1/30/2024     8:47 AM   Additional Questions   Roomed by VE   Accompanied by Spouse     Fatigue  Associated symptoms include fatigue.   History of Present Illness       Reason for visit:  Full blood panel as I've felt very fatigued recently    He eats 2-3 servings of fruits and vegetables daily.He consumes 0 sweetened beverage(s) daily.He exercises with enough effort to increase his heart rate 30 to 60 minutes per day.  He exercises with enough effort to increase his heart rate 5 days per week.   He is taking medications regularly.     Shaunna every 3 weeks.     Depression and Anxiety Follow-Up  How are you doing with your depression since your last visit? Worsened   How are you doing with your anxiety since your last visit?  Worsened   Are you having other symptoms that might be associated with depression or anxiety? Yes:  fatigue, excessive sleeping, heavy limbs and upper back pain and heaviness, dark circles under eyes, holding breath when he sleeps, night sweats  Have you had a significant life event? Job Concerns   Do you have any concerns with your use of alcohol or other drugs? No    Social History     Tobacco Use    Smoking status: Never     Passive  "exposure: Never    Smokeless tobacco: Never    Tobacco comments:     no nicotine exposure   Vaping Use    Vaping Use: Never used   Substance Use Topics    Alcohol use: No    Drug use: No         4/10/2023     6:52 AM 10/2/2023     8:47 AM 1/30/2024     8:41 AM   PHQ   PHQ-9 Total Score 11 17 14   Q9: Thoughts of better off dead/self-harm past 2 weeks Several days Several days Not at all   F/U: Thoughts of suicide or self-harm No No    F/U: Safety concerns No No          2/10/2021    10:42 AM 11/15/2021     1:52 PM 1/9/2023     9:14 AM   OCTAVIO-7 SCORE   Total Score  3 (minimal anxiety) 17 (severe anxiety)   Total Score 1 3 17     Objective    /82 (BP Location: Left arm, Patient Position: Chair, Cuff Size: Adult Regular)   Pulse 84   Temp 98.1  F (36.7  C) (Temporal)   Resp 16   Ht 1.81 m (5' 11.25\")   Wt 89.4 kg (197 lb 3 oz)   SpO2 97%   BMI 27.31 kg/m    Body mass index is 27.31 kg/m .  Physical Exam     GENERAL: alert and no distress  EYES: Eyes grossly normal to inspection, PERRL and conjunctivae and sclerae normal  HENT: ear canals and TM's normal, nose and mouth without ulcers or lesions  NECK: no adenopathy, no asymmetry, masses, or scars  RESP: lungs clear to auscultation - no rales, rhonchi or wheezes  CV: regular rate and rhythm, normal S1 S2, no S3 or S4, no murmur, click or rub, no peripheral edema  ABDOMEN: soft, nontender, no hepatosplenomegaly, no masses and bowel sounds normal  MS: no gross musculoskeletal defects noted, no edema  PSYCH: mentation appears normal and affect flat    Results for orders placed or performed in visit on 01/30/24   Comprehensive metabolic panel     Status: Abnormal   Result Value Ref Range    Sodium 141 135 - 145 mmol/L    Potassium 4.3 3.4 - 5.3 mmol/L    Carbon Dioxide (CO2) 28 22 - 29 mmol/L    Anion Gap 9 7 - 15 mmol/L    Urea Nitrogen 17.4 6.0 - 20.0 mg/dL    Creatinine 0.94 0.67 - 1.17 mg/dL    GFR Estimate >90 >60 mL/min/1.73m2    Calcium 9.3 8.6 - 10.0 " mg/dL    Chloride 104 98 - 107 mmol/L    Glucose 104 (H) 70 - 99 mg/dL    Alkaline Phosphatase 58 40 - 150 U/L    AST 22 0 - 45 U/L    ALT 19 0 - 70 U/L    Protein Total 7.4 6.4 - 8.3 g/dL    Albumin 4.6 3.5 - 5.2 g/dL    Bilirubin Total 0.4 <=1.2 mg/dL   Vitamin D Deficiency     Status: Abnormal   Result Value Ref Range    Vitamin D, Total (25-Hydroxy) 61 (H) 20 - 50 ng/mL    Narrative    Season, race, dietary intake, and treatment affect the concentration of 25-hydroxy-Vitamin D. Values may decrease during winter months and increase during summer months.    Vitamin D determination is routinely performed by an immunoassay specific for 25 hydroxyvitamin D3.  If an individual is on vitamin D2(ergocalciferol) supplementation, please specify 25 OH vitamin D2 and D3 level determination by LCMSMS test VITD23.     Vitamin B12     Status: Normal   Result Value Ref Range    Vitamin B12 740 232 - 1,245 pg/mL   Hemoglobin A1c     Status: Abnormal   Result Value Ref Range    Hemoglobin A1C 5.7 (H) 0.0 - 5.6 %   TSH with free T4 reflex     Status: Normal   Result Value Ref Range    TSH 1.77 0.30 - 4.20 uIU/mL   Lipid panel reflex to direct LDL Fasting     Status: Abnormal   Result Value Ref Range    Cholesterol 174 <200 mg/dL    Triglycerides 50 <150 mg/dL    Direct Measure HDL 57 >=40 mg/dL    LDL Cholesterol Calculated 107 (H) <=100 mg/dL    Non HDL Cholesterol 117 <130 mg/dL    Patient Fasting > 8hrs? Yes     Narrative    Cholesterol  Desirable:  <200 mg/dL    Triglycerides  Normal:  Less than 150 mg/dL  Borderline High:  150-199 mg/dL  High:  200-499 mg/dL  Very High:  Greater than or equal to 500 mg/dL    Direct Measure HDL  Female:  Greater than or equal to 50 mg/dL   Male:  Greater than or equal to 40 mg/dL    LDL Cholesterol  Desirable:  <100mg/dL  Above Desirable:  100-129 mg/dL   Borderline High:  130-159 mg/dL   High:  160-189 mg/dL   Very High:  >= 190 mg/dL    Non HDL Cholesterol  Desirable:  130 mg/dL  Above  Desirable:  130-159 mg/dL  Borderline High:  160-189 mg/dL  High:  190-219 mg/dL  Very High:  Greater than or equal to 220 mg/dL   Testosterone, total     Status: Normal   Result Value Ref Range    Testosterone Total 525 240 - 950 ng/dL    Narrative    This test was developed and its performance characteristics determined by the [facility],  Special [facility] Laboratory. It has not been cleared or approved by the FDA. The laboratory is regulated under CLIA as qualified to perform high-complexity testing. This test is used for clinical purposes. It should not be regarded as investigational or for research.   CBC with platelets and differential     Status: None   Result Value Ref Range    WBC Count 5.4 4.0 - 11.0 10e3/uL    RBC Count 4.71 4.40 - 5.90 10e6/uL    Hemoglobin 14.2 13.3 - 17.7 g/dL    Hematocrit 41.7 40.0 - 53.0 %    MCV 89 78 - 100 fL    MCH 30.1 26.5 - 33.0 pg    MCHC 34.1 31.5 - 36.5 g/dL    RDW 12.5 10.0 - 15.0 %    Platelet Count 182 150 - 450 10e3/uL    % Neutrophils 68 %    % Lymphocytes 23 %    % Monocytes 7 %    % Eosinophils 0 %    % Basophils 1 %    % Immature Granulocytes 0 %    Absolute Neutrophils 3.7 1.6 - 8.3 10e3/uL    Absolute Lymphocytes 1.3 0.8 - 5.3 10e3/uL    Absolute Monocytes 0.4 0.0 - 1.3 10e3/uL    Absolute Eosinophils 0.0 0.0 - 0.7 10e3/uL    Absolute Basophils 0.0 0.0 - 0.2 10e3/uL    Absolute Immature Granulocytes 0.0 <=0.4 10e3/uL   CBC with platelets and differential     Status: None    Narrative    The following orders were created for panel order CBC with platelets and differential.  Procedure                               Abnormality         Status                     ---------                               -----------         ------                     CBC with platelets and d...[[id number]]                      Final result                 Please view results for these tests on the individual orders.             Signed Electronically by: Humberto  ALEX Altamirano MD

## 2024-01-30 NOTE — PROGRESS NOTES
Answers submitted by the patient for this visit:  Patient Health Questionnaire (Submitted on 1/30/2024)  If you checked off any problems, how difficult have these problems made it for you to do your work, take care of things at home, or get along with other people?: Very difficult  PHQ9 TOTAL SCORE: 14  General Questionnaire (Submitted on 1/28/2024)  Chief Complaint: Chronic problems general questions HPI Form  What is the reason for your visit today? : Full blood panel as I've felt very fatigued recently  How many servings of fruits and vegetables do you eat daily?: 2-3  On average, how many sweetened beverages do you drink each day (Examples: soda, juice, sweet tea, etc.  Do NOT count diet or artificially sweetened beverages)?: 0  How many minutes a day do you exercise enough to make your heart beat faster?: 30 to 60  How many days a week do you exercise enough to make your heart beat faster?: 5  How many days per week do you miss taking your medication?: 0

## 2024-01-31 DIAGNOSIS — Z13.21 ENCOUNTER FOR VITAMIN DEFICIENCY SCREENING: Primary | ICD-10-CM

## 2024-02-01 LAB — TESTOST SERPL-MCNC: 525 NG/DL (ref 240–950)

## 2024-02-07 ENCOUNTER — TELEPHONE (OUTPATIENT)
Dept: FAMILY MEDICINE | Facility: CLINIC | Age: 34
End: 2024-02-07
Payer: COMMERCIAL

## 2024-02-07 DIAGNOSIS — F41.1 GAD (GENERALIZED ANXIETY DISORDER): Primary | ICD-10-CM

## 2024-02-07 RX ORDER — BUPROPION HYDROCHLORIDE 75 MG/1
TABLET ORAL
Qty: 30 TABLET | Refills: 0 | Status: SHIPPED | OUTPATIENT
Start: 2024-02-07 | End: 2024-03-29 | Stop reason: SINTOL

## 2024-02-07 NOTE — TELEPHONE ENCOUNTER
It looks like patient was to change his bupropion order to 75mg BID, but the 150mg tablets he has now are extended release and can't be cut in half. Can you send a new Rx for 75mg tablets? Thanks.

## 2024-02-08 ENCOUNTER — VIRTUAL VISIT (OUTPATIENT)
Dept: PHARMACY | Facility: CLINIC | Age: 34
End: 2024-02-08
Attending: FAMILY MEDICINE
Payer: COMMERCIAL

## 2024-02-08 ENCOUNTER — MYC MEDICAL ADVICE (OUTPATIENT)
Dept: PHARMACY | Facility: CLINIC | Age: 34
End: 2024-02-08
Payer: COMMERCIAL

## 2024-02-08 DIAGNOSIS — F41.1 GAD (GENERALIZED ANXIETY DISORDER): ICD-10-CM

## 2024-02-08 DIAGNOSIS — F33.9 MDD (MAJOR DEPRESSIVE DISORDER), RECURRENT EPISODE (H): Primary | ICD-10-CM

## 2024-02-08 PROCEDURE — 99207 PR NO CHARGE LOS: CPT | Mod: 93 | Performed by: PHARMACIST

## 2024-02-08 RX ORDER — AMOXICILLIN 500 MG
1200 CAPSULE ORAL DAILY
COMMUNITY

## 2024-02-08 RX ORDER — CALCIUM CARBONATE/VITAMIN D3 500-10/5ML
1 LIQUID (ML) ORAL DAILY
COMMUNITY

## 2024-02-08 RX ORDER — FAMOTIDINE 20 MG
1 TABLET ORAL DAILY
COMMUNITY

## 2024-02-08 NOTE — PROGRESS NOTES
Disease State Management Encounter:                          Tapan Oh is a 33 year old male called for an initial visit. He was referred to me from PCP.     Reason for visit: pharmacogenetic testing request.    MDD, anxiety:   Paxil 50mg daily   Wellbutrin IR 75mg daily  Trazodone 25-50mg nightly  Ativan 0.5mg-1mg three times daily as needed anxiety (taking a few times per month)    Julio is seen at [facility] by Humberto Altamirano. Most recent visit was 1/30/24 at which time  Wellbutrin was started and pharmacogenetic (PGx) testing was discussed . Please see note by pt's provider for additional details regarding symptoms and history.     Today, patient reports he has been on paxil and trazodone since 2010. Finds it partially helpful, feels it has become less effective over time.  Wellbutrin recently started - XL 150mg caused insomnia so it was switched to IR 75mg. Recent stressors - new job.    Assessment:    Reviewed pharmacogenetic (PGx) testing with patient.  Discussed that results will not tell us which drugs will work but can give us some insight into medication levels and possible dosing/side effect risk based on his enzyme activity. Reviewed potential out-of-pocket cost, meeting with a genetic counselor, meeting with MTM to review results.     Plan: patient will check with insurance to see if test is covered. Will let writer know if he would like to pursue testing.       I spent 20 minutes with this patient today. A copy of the visit note was provided to the patient's provider(s).    A summary of these recommendations was sent via [facility].    Barbara Joyner, PharmD, BCPP  Medication Therapy Management Pharmacist  [facility]           Medication Therapy Recommendations  No medication therapy recommendations to display

## 2024-02-14 ENCOUNTER — TELEPHONE (OUTPATIENT)
Dept: FAMILY MEDICINE | Facility: CLINIC | Age: 34
End: 2024-02-14
Payer: COMMERCIAL

## 2024-02-14 DIAGNOSIS — F33.1 MODERATE EPISODE OF RECURRENT MAJOR DEPRESSIVE DISORDER (H): Chronic | ICD-10-CM

## 2024-02-14 DIAGNOSIS — F41.1 GAD (GENERALIZED ANXIETY DISORDER): Primary | Chronic | ICD-10-CM

## 2024-02-14 NOTE — TELEPHONE ENCOUNTER
Received call from Premier Juma Galdamez, [facility] [phone]  [facility]- [id number]    Caller reports that patient was told to follow up with insurance company about genetic testing that provider wanted to order so that patient could start a prescription to see if it was covered.      Provider wanted Drug Metabolism Genomic sequence Analysis Panel ordered.   CPT code 56710    Caller reports that a Prior Authorization is needed for genetic testing based on CPT code 74927.     Testing is not ordered. Routing to provider to place order for testing if needed to initiate PA.     Please route back to nursing once reviewed/order place.      RITU Pereyra, RN  [facility] ~ Registered Nurse  Clinic Triage ~ [place] [facility] [place]  February 14, 2024

## 2024-02-14 NOTE — TELEPHONE ENCOUNTER
RN spoke with PCP. PCP placed referral for MTM testing but did not order specific testing, recommended to follow up with MTM regarding specific testing that may need ordering.     RN routing to Barbara Joyner, PharmD, BCPP for review and advisement if order is able to be placed.     Once order place RN will route to PA team (RN awaiting response on pool name to send to from PA contact)    Please route back to RG Nurse Pool once reviewed/ order placed.     RITU Pereyra, RN  [facility] ~ Registered Nurse  Clinic Triage ~ [place] [facility] [place]  February 14, 2024

## 2024-02-15 NOTE — TELEPHONE ENCOUNTER
- Patient would like pharmacogenetic (PGx) testing completed. See 2/8/24 mychart encounter with patient and 2/8/24 MTM visit.    - Order placed by writer today per patient request. Routed to [facility], located within [facility] to assist with PA.

## 2024-02-15 NOTE — TELEPHONE ENCOUNTER
"RN reached out to Sam Tran and was advised to send and email to [email or web address] for assistance with this PA processing.     Email RN sent-   \"Good Morning,     I received this email address from Sam Tran as there is no pool to send a prior authorization request to for genetic testing. We ([facility] nursing) received a call from the patient's insurance company advising that a prior authorization is needed in order for patient to ensure coverage of genetic testing that MTM team discussed with patient prior to scheduling/completing.     Test ordered: Drug Metabolism Genomic Sequence Analysis Panel- CPT code 61305.      There is a telephone encounter opened in the patient's chart for this PA request dated 2/14/24 but this RN was unsure where to route it to for processing as the PA pool that RN normally uses is only for medications. RN was advised to sent to this email address for assistance.      If you are able, please respond to encounter in patient's chart and route to [place] RN pool so that other [place] RN team members can assist with this process if needed as this RN is out of clinic next week.     Please let us know if you have questions.    Thank you for your assistance in this.     Denice Islas\"    RITU Pereyra, RN  [facility] ~ Registered Nurse  Clinic Triage ~ [place] [facility] [place]  February 15, 2024    "

## 2024-02-16 NOTE — TELEPHONE ENCOUNTER
RN received correspondence from the PA team who has been assisting on the below.     Pharmacist Barbara Joyner, [facility] placed a referral for Adult Genetics & Metabolism Referral. Lab test not ordered but may need to be ordered by Genetics with an appointment for informed consenting.     RN has reached out to PA team to confirm this.     RITU Pereyra, RN  [facility] ~ Registered Nurse  Clinic Triage ~ [place] [facility] [place]  February 16, 2024

## 2024-03-28 ASSESSMENT — PATIENT HEALTH QUESTIONNAIRE - PHQ9
SUM OF ALL RESPONSES TO PHQ QUESTIONS 1-9: 14
10. IF YOU CHECKED OFF ANY PROBLEMS, HOW DIFFICULT HAVE THESE PROBLEMS MADE IT FOR YOU TO DO YOUR WORK, TAKE CARE OF THINGS AT HOME, OR GET ALONG WITH OTHER PEOPLE: EXTREMELY DIFFICULT
SUM OF ALL RESPONSES TO PHQ QUESTIONS 1-9: 14

## 2024-03-28 ASSESSMENT — ANXIETY QUESTIONNAIRES
GAD7 TOTAL SCORE: 20
2. NOT BEING ABLE TO STOP OR CONTROL WORRYING: NEARLY EVERY DAY
5. BEING SO RESTLESS THAT IT IS HARD TO SIT STILL: MORE THAN HALF THE DAYS
4. TROUBLE RELAXING: NEARLY EVERY DAY
8. IF YOU CHECKED OFF ANY PROBLEMS, HOW DIFFICULT HAVE THESE MADE IT FOR YOU TO DO YOUR WORK, TAKE CARE OF THINGS AT HOME, OR GET ALONG WITH OTHER PEOPLE?: EXTREMELY DIFFICULT
6. BECOMING EASILY ANNOYED OR IRRITABLE: NEARLY EVERY DAY
GAD7 TOTAL SCORE: 20
IF YOU CHECKED OFF ANY PROBLEMS ON THIS QUESTIONNAIRE, HOW DIFFICULT HAVE THESE PROBLEMS MADE IT FOR YOU TO DO YOUR WORK, TAKE CARE OF THINGS AT HOME, OR GET ALONG WITH OTHER PEOPLE: EXTREMELY DIFFICULT
1. FEELING NERVOUS, ANXIOUS, OR ON EDGE: NEARLY EVERY DAY
3. WORRYING TOO MUCH ABOUT DIFFERENT THINGS: NEARLY EVERY DAY
7. FEELING AFRAID AS IF SOMETHING AWFUL MIGHT HAPPEN: NEARLY EVERY DAY
7. FEELING AFRAID AS IF SOMETHING AWFUL MIGHT HAPPEN: NEARLY EVERY DAY
GAD7 TOTAL SCORE: 20

## 2024-03-29 ENCOUNTER — OFFICE VISIT (OUTPATIENT)
Dept: FAMILY MEDICINE | Facility: CLINIC | Age: 34
End: 2024-03-29
Payer: COMMERCIAL

## 2024-03-29 VITALS
OXYGEN SATURATION: 98 % | WEIGHT: 199 LBS | SYSTOLIC BLOOD PRESSURE: 132 MMHG | DIASTOLIC BLOOD PRESSURE: 86 MMHG | RESPIRATION RATE: 12 BRPM | BODY MASS INDEX: 27.86 KG/M2 | HEART RATE: 63 BPM | HEIGHT: 71 IN | TEMPERATURE: 98.6 F

## 2024-03-29 DIAGNOSIS — F41.1 GAD (GENERALIZED ANXIETY DISORDER): Primary | ICD-10-CM

## 2024-03-29 DIAGNOSIS — F33.1 MODERATE EPISODE OF RECURRENT MAJOR DEPRESSIVE DISORDER (H): ICD-10-CM

## 2024-03-29 DIAGNOSIS — R41.840 INATTENTION: ICD-10-CM

## 2024-03-29 PROBLEM — M25.561 RIGHT KNEE PAIN: Status: RESOLVED | Noted: 2017-10-25 | Resolved: 2024-03-29

## 2024-03-29 PROCEDURE — 99214 OFFICE O/P EST MOD 30 MIN: CPT | Performed by: FAMILY MEDICINE

## 2024-03-29 RX ORDER — LORAZEPAM 1 MG/1
.5-1 TABLET ORAL EVERY 8 HOURS PRN
Qty: 60 TABLET | Refills: 2 | Status: SHIPPED | OUTPATIENT
Start: 2024-03-29 | End: 2024-04-24

## 2024-03-29 RX ORDER — LORAZEPAM 1 MG/1
TABLET ORAL
Qty: 15 TABLET | Refills: 2 | Status: CANCELLED | OUTPATIENT
Start: 2024-03-29

## 2024-03-29 RX ORDER — PAROXETINE 30 MG/1
30 TABLET, FILM COATED ORAL EVERY MORNING
Qty: 90 TABLET | Refills: 1 | Status: SHIPPED | OUTPATIENT
Start: 2024-03-29 | End: 2024-05-02

## 2024-03-29 RX ORDER — PAROXETINE 20 MG/1
20 TABLET, FILM COATED ORAL DAILY
Qty: 90 TABLET | Refills: 0 | Status: SHIPPED | OUTPATIENT
Start: 2024-03-29 | End: 2024-05-02

## 2024-03-29 RX ORDER — BUSPIRONE HYDROCHLORIDE 10 MG/1
10 TABLET ORAL 2 TIMES DAILY
Qty: 60 TABLET | Refills: 0 | Status: SHIPPED | OUTPATIENT
Start: 2024-03-29 | End: 2024-04-08

## 2024-03-29 ASSESSMENT — PAIN SCALES - GENERAL: PAINLEVEL: NO PAIN (0)

## 2024-03-29 NOTE — PROGRESS NOTES
"  Assessment & Plan     OCTAVIO (generalized anxiety disorder)  Needs improvement, mild improvement with fatigue while using bupropion but self discontinued due to side effects of increased anxiety  - PARoxetine (PAXIL) 20 MG tablet; Take 1 tablet (20 mg) by mouth daily With 30 mg to make 50 mg daily  - PARoxetine (PAXIL) 30 MG tablet; Take 1 tablet (30 mg) by mouth every morning  - busPIRone (BUSPAR) 10 MG tablet; Take 1 tablet (10 mg) by mouth 2 times daily  - Adult Mental Health  Referral; Future  - LORazepam (ATIVAN) 1 MG tablet; Take 0.5-1 tablets (0.5-1 mg) by mouth every 8 hours as needed for anxiety TAKE 1/2 TO 1 TABLET BY MOUTH THREE TIMES A DAY AS NEEDED FOR ANXIETY    Moderate episode of recurrent major depressive disorder (H)  - PARoxetine (PAXIL) 20 MG tablet; Take 1 tablet (20 mg) by mouth daily With 30 mg to make 50 mg daily  - PARoxetine (PAXIL) 30 MG tablet; Take 1 tablet (30 mg) by mouth every morning  - Adult Mental Health  Referral; Future    Inattention  - Adult Mental Health  Referral; Future          BMI  Estimated body mass index is 27.55 kg/m  as calculated from the following:    Height as of this encounter: 1.81 m (5' 11.26\").    Weight as of this encounter: 90.3 kg (199 lb).   Weight management plan: Discussed healthy diet and exercise guidelines          Nadine Phelan is a 33 year old, presenting for the following health issues:  Recheck Medication        3/29/2024    10:30 AM   Additional Questions   Roomed by Lulu Hernandez CMA   Accompanied by self         3/29/2024    10:30 AM   Patient Reported Additional Medications   Patient reports taking the following new medications none     History of Present Illness       Mental Health Follow-up:  Patient presents to follow-up on Anxiety.    Patient's anxiety since last visit has been:  Worse  The patient is having other symptoms associated with anxiety.  Any significant life events: job concerns  Patient is feeling " "anxious or having panic attacks.  Patient has no concerns about alcohol or drug use.    He eats 4 or more servings of fruits and vegetables daily.He consumes 0 sweetened beverage(s) daily.He exercises with enough effort to increase his heart rate 60 or more minutes per day.  He exercises with enough effort to increase his heart rate 6 days per week.   He is taking medications regularly.        10/2/2023     8:47 AM 1/30/2024     8:41 AM 3/28/2024     8:04 PM   PHQ   PHQ-9 Total Score 17 14 14   Q9: Thoughts of better off dead/self-harm past 2 weeks Several days Not at all Not at all   F/U: Thoughts of suicide or self-harm No     F/U: Safety concerns No           11/15/2021     1:52 PM 1/9/2023     9:14 AM 3/28/2024     8:04 PM   OCTAVIO-7 SCORE   Total Score 3 (minimal anxiety) 17 (severe anxiety) 20 (severe anxiety)   Total Score 3 17 20         Social History     Tobacco Use    Smoking status: Never     Passive exposure: Never    Smokeless tobacco: Never    Tobacco comments:     no nicotine exposure   Vaping Use    Vaping Use: Never used   Substance Use Topics    Alcohol use: No    Drug use: No           Depression Screening Follow Up    Follow Up Actions Taken  Crisis resource information provided in After Visit Summary              Review of Systems  Constitutional, HEENT, cardiovascular, pulmonary, gi and gu systems are negative, except as otherwise noted.      Objective    /86   Pulse 63   Temp 98.6  F (37  C) (Temporal)   Resp 12   Ht 1.81 m (5' 11.26\")   Wt 90.3 kg (199 lb)   SpO2 98%   BMI 27.55 kg/m    Body mass index is 27.55 kg/m .  Physical Exam   GENERAL: alert and no distress  NECK: no adenopathy, no asymmetry, masses, or scars  RESP: lungs clear to auscultation - no rales, rhonchi or wheezes  CV: regular rate and rhythm, normal S1 S2, no S3 or S4, no murmur, click or rub, no peripheral edema  ABDOMEN: soft, nontender, no hepatosplenomegaly, no masses and bowel sounds normal  MS: no gross " musculoskeletal defects noted, no edema  PSYCH: mentation appears normal, tearful, anxious, and judgement and insight intact            Signed Electronically by: Humberto Altamirano MD

## 2024-03-29 NOTE — PATIENT INSTRUCTIONS
Follow up genetics testing [phone].   ADHD testing, please schedule  Start buspirone, 10 twice daily   With therapy check with insurance for coverage; [employer] [facility]  Darcie willett

## 2024-04-05 ENCOUNTER — TELEPHONE (OUTPATIENT)
Dept: FAMILY MEDICINE | Facility: CLINIC | Age: 34
End: 2024-04-05
Payer: COMMERCIAL

## 2024-04-05 NOTE — TELEPHONE ENCOUNTER
RN placed call to patient and advised per provider okay to hold Buspar until seen on Monday. Patient verbalized understanding and all questions answered.     Appointments in Next Year      Apr 08, 2024  4:00 PM  (Arrive by 3:55 PM)  Provider Visit with Dennis Lackey MD  [facility] [place] ([facility] ) [phone]     RITU Pereyra, RN  [facility] ~ Registered Nurse  Clinic Triage ~ [place] [facility] [place]  April 5, 2024

## 2024-04-05 NOTE — TELEPHONE ENCOUNTER
Patient has class at 5pm today is unable to make end of day appointment tonight, scheduled for Monday at 4pm.     Patient wondering if he is able to stop Buspar until appointment with provider due to side effects and trouble sleeping?    YAYO PereyraN, RN  [facility] ~ Registered Nurse  Clinic Triage ~ [place] [facility] [place]  April 5, 2024

## 2024-04-05 NOTE — TELEPHONE ENCOUNTER
"S-(situation):   Patient calling in to follow up on recent medication that he started and side effects he is having. Started on busPIRone (BUSPAR) 10 MG tablet on 3/29/24.    B-(background):   Patient reports that he has been waking up the last 2-3 night at 2am-3am and is unable to fall back asleep.     Patient also reports that he is extremely emotional (\"I started crying yesterday for no reason and it is making work hard\"), fatigued- thinking due to lack of sleep, muscle soreness, and sweating at night.    Recently on buPROPion (WELLBUTRIN) but stopped due to side effects of increased anxiety and inability to sleep at all at night. Wellbutrin caused patient to not be able to fall asleep or stay asleep while currently on buspirone patient can fall asleep but wakes up wide awake, \"unable to fall back asleep in a puddle of sweat\"    Currently also taking:   Paxil 50mg daily  Trazodone 50mg nightly  Lorazapam as needed     Seeing a therapist at [facility] - referral place for [facility] with [place], patient working on transitioning care here.     Patient to call to schedule genetic testing.     A-(assessment):   Patient endorses that he has a history of feeling SOB and tightness in his upper bodyduring panic attacks- this was present intermittently prior to starting medication. Denies symptoms presently.     Patient reports he feels a little shaky- RN encouraged eating breakfast and drinking water.     R-(recommendations):   RN advised that she will follow up with a covering provider on next steps while PCP is out unexpectedly. RN advised covering provider may recommend follow up appt prior to changes.    Patient wondering if he should stop medication, if there is an alternative to try, if appt needed.   Patient in agreement with plan.   RN reviewed when to call clinic back and/or seek higher level of care.     RITU Pereyra, RN  [facility] ~ Registered Nurse  Clinic Triage ~ [place] [facility] [place]  April 5, " 2024

## 2024-04-08 ENCOUNTER — ORDERS ONLY (AUTO-RELEASED) (OUTPATIENT)
Dept: FAMILY MEDICINE | Facility: CLINIC | Age: 34
End: 2024-04-08

## 2024-04-08 ENCOUNTER — VIRTUAL VISIT (OUTPATIENT)
Dept: FAMILY MEDICINE | Facility: CLINIC | Age: 34
End: 2024-04-08
Payer: COMMERCIAL

## 2024-04-08 DIAGNOSIS — F41.1 GAD (GENERALIZED ANXIETY DISORDER): ICD-10-CM

## 2024-04-08 DIAGNOSIS — F33.1 MODERATE EPISODE OF RECURRENT MAJOR DEPRESSIVE DISORDER (H): ICD-10-CM

## 2024-04-08 PROCEDURE — 99214 OFFICE O/P EST MOD 30 MIN: CPT | Mod: 95 | Performed by: FAMILY MEDICINE

## 2024-04-08 RX ORDER — PRAZOSIN HYDROCHLORIDE 1 MG/1
1 CAPSULE ORAL AT BEDTIME
Qty: 30 CAPSULE | Refills: 0 | Status: SHIPPED | OUTPATIENT
Start: 2024-04-08 | End: 2024-04-24

## 2024-04-08 RX ORDER — PAROXETINE 20 MG/1
20 TABLET, FILM COATED ORAL DAILY
Qty: 90 TABLET | Refills: 0 | Status: CANCELLED | OUTPATIENT
Start: 2024-04-08

## 2024-04-08 RX ORDER — BUSPIRONE HYDROCHLORIDE 10 MG/1
10 TABLET ORAL 2 TIMES DAILY
Qty: 60 TABLET | Refills: 0 | Status: CANCELLED | OUTPATIENT
Start: 2024-04-08

## 2024-04-08 RX ORDER — GABAPENTIN 100 MG/1
100 CAPSULE ORAL 3 TIMES DAILY
Qty: 90 CAPSULE | Refills: 0 | Status: SHIPPED | OUTPATIENT
Start: 2024-04-08 | End: 2024-04-24

## 2024-04-08 NOTE — PROGRESS NOTES
"Instructions Relayed to Patient by Virtual Roomer:     Patient is active on Stion:   Relayed following to patient: \"It looks like you are active on Stion, are you able to join the visit this way? If not, do you need us to send you a link now or would you like your provider to send a link via text or email when they are ready to initiate the visit?\"    Reminded patient to ensure they were logged on to virtual visit by arrival time listed. Documented in appointment notes if patient had flexibility to initiate visit sooner than arrival time. If pediatric virtual visit, ensured pediatric patient along with parent/guardian will be present for video visit.     Patient offered the website [email or web address] and/or phone number to [employer]ion Help line: [phone]    Tapan is a 33 year old who is being evaluated via a billable video visit.    How would you like to obtain your AVS? Th[employer]  If the video visit is dropped, the invitation should be resent by: Text to cell phone: [phone]  Will anyone else be joining your video visit? No    Assessment & Plan   1. OCTAVIO (generalized anxiety disorder)  2. Moderate episode of recurrent major depressive disorder (H)  Has trialed a number of medications.  Not doing well with BuSpar with insomnia.  Recommend scheduling gabapentin 100 mg 3 times daily.  Given concern for PTSD due to prior work and father related trauma will trial prazosin 1 mg at night.  Alternative as needed anxiety regiment during the day may consist of hydroxyzine or propranolol.  Would monitor propranolol use with prazosin use if we decide to go that route.  Is looking to get tested for ADHD.  Reports previous referral given by PCP and is looking to scheduling this.  Is looking into getting a new counselor as his current was not going well.  Recommend follow-up with PCP in 2 to 4 weeks.  I can work him in sooner into my schedule if needed until she returns.  - prazosin (MINIPRESS) 1 MG capsule; " "Take 1 capsule (1 mg) by mouth at bedtime  Dispense: 30 capsule; Refill: 0  - ZIO PATCH MAIL OUT; Future  - gabapentin (NEURONTIN) 100 MG capsule; Take 1 capsule (100 mg) by mouth 3 times daily  Dispense: 90 capsule; Refill: 0  - PRIMARY CARE FOLLOW-UP SCHEDULING; Future       Follow-up Visit   Expected date:  Apr 22, 2024 (Approximate)      Follow Up Appointment Details:     Follow-up with whom?: Me    Follow-Up for what?: Chronic Disease f/u    Chronic Disease f/u:  Anxiety  Depression       How?: In Person or Virtual    Is this an as-needed follow-up?: No                   Dennis Lackey MD  [facility]    Disclaimer: This note consists of symbols derived from keyboarding, dictation and/or voice recognition software. As a result, there may be errors in the script that have gone undetected. Please consider this when interpreting information found in this chart.    Nadine Phelan is a 33 year old, presenting for the following health issues:  Recheck Medication (Buspirone)        4/8/2024     2:07 PM   Additional Questions   Roomed by Anastasiya MOONEY   Accompanied by Self     HPI     Work is stressful, started February 12th. Worsening anxiety 2 weeks ago. Walking up with panic attacks.     Gets night terrors and screams    Lorazipam use consistent for 2 weeks every morning.     On paxil since ~2010 per patient report. Was on celexa in 2014 (doesn't recall effect).  Cymbalta in 2023 doesn't recall. VIIBRYD in 2024 was not a good fit, emotions \"went sideways\" and intrusive thoughts. 2018 was on venlafaxine for 2 Rx, doesn't remember the drug.    Hydroxyzine he doesn't remember using.    Chart review shows prior trials of Wellbutrin. Not doing well currently with buspar.    TSH normal 1.77 on 1/30/24.    Has never been on gabapentin, prazosin, or propranolol.    See's [facility] [employer].     Review of Systems  Constitutional, HEENT, cardiovascular, pulmonary, GI, , " musculoskeletal, neuro, skin, endocrine and psych systems are negative, except as otherwise noted.      Objective       Vitals:  No vitals were obtained today due to virtual visit.    Physical Exam   GENERAL: alert and no distress  EYES: Eyes grossly normal to inspection.  No discharge or erythema, or obvious scleral/conjunctival abnormalities.  RESP: No audible wheeze, cough, or visible cyanosis.    SKIN: Visible skin clear. No significant rash, abnormal pigmentation or lesions.  NEURO: Cranial nerves grossly intact.  Mentation and speech appropriate for age.  PSYCH: Appropriate affect, tone, and pace of words    Labs: None      Video-Visit Details    Type of service:  Video Visit   Originating Location (pt. Location): Home    Distant Location (provider location):  On-site  Platform used for Video Visit: Davi  Signed Electronically by: Dennis Lackey MD

## 2024-04-12 ENCOUNTER — TELEPHONE (OUTPATIENT)
Dept: FAMILY MEDICINE | Facility: CLINIC | Age: 34
End: 2024-04-12
Payer: COMMERCIAL

## 2024-04-12 NOTE — TELEPHONE ENCOUNTER
Patient had virtual visit 4/8/24 for anxiety and depression.   Zio patch was ordered. Patient received this in the mail yesterday.   He states he is going to hold off on this for now. He would like to try the other things discussed during visit. He also wants to be able to continue his hot yoga and martial arts as this very much helps his mental health.     He states if he continues to experience symptoms he will call back and update provider that he will try the zio patch.    Nalini ZEEN, RN

## 2024-04-22 ENCOUNTER — MYC MEDICAL ADVICE (OUTPATIENT)
Dept: FAMILY MEDICINE | Facility: CLINIC | Age: 34
End: 2024-04-22
Payer: COMMERCIAL

## 2024-04-22 DIAGNOSIS — F41.1 GAD (GENERALIZED ANXIETY DISORDER): ICD-10-CM

## 2024-04-24 RX ORDER — LORAZEPAM 1 MG/1
.5-1 TABLET ORAL EVERY 8 HOURS PRN
Qty: 60 TABLET | Refills: 2 | Status: SHIPPED | OUTPATIENT
Start: 2024-04-24 | End: 2024-06-21

## 2024-05-01 ASSESSMENT — ANXIETY QUESTIONNAIRES
GAD7 TOTAL SCORE: 16
GAD7 TOTAL SCORE: 16
6. BECOMING EASILY ANNOYED OR IRRITABLE: MORE THAN HALF THE DAYS
GAD7 TOTAL SCORE: 16
IF YOU CHECKED OFF ANY PROBLEMS ON THIS QUESTIONNAIRE, HOW DIFFICULT HAVE THESE PROBLEMS MADE IT FOR YOU TO DO YOUR WORK, TAKE CARE OF THINGS AT HOME, OR GET ALONG WITH OTHER PEOPLE: EXTREMELY DIFFICULT
8. IF YOU CHECKED OFF ANY PROBLEMS, HOW DIFFICULT HAVE THESE MADE IT FOR YOU TO DO YOUR WORK, TAKE CARE OF THINGS AT HOME, OR GET ALONG WITH OTHER PEOPLE?: EXTREMELY DIFFICULT
3. WORRYING TOO MUCH ABOUT DIFFERENT THINGS: NEARLY EVERY DAY
7. FEELING AFRAID AS IF SOMETHING AWFUL MIGHT HAPPEN: SEVERAL DAYS
2. NOT BEING ABLE TO STOP OR CONTROL WORRYING: MORE THAN HALF THE DAYS
4. TROUBLE RELAXING: NEARLY EVERY DAY
5. BEING SO RESTLESS THAT IT IS HARD TO SIT STILL: MORE THAN HALF THE DAYS
7. FEELING AFRAID AS IF SOMETHING AWFUL MIGHT HAPPEN: SEVERAL DAYS
1. FEELING NERVOUS, ANXIOUS, OR ON EDGE: NEARLY EVERY DAY

## 2024-05-01 ASSESSMENT — PATIENT HEALTH QUESTIONNAIRE - PHQ9
SUM OF ALL RESPONSES TO PHQ QUESTIONS 1-9: 17
10. IF YOU CHECKED OFF ANY PROBLEMS, HOW DIFFICULT HAVE THESE PROBLEMS MADE IT FOR YOU TO DO YOUR WORK, TAKE CARE OF THINGS AT HOME, OR GET ALONG WITH OTHER PEOPLE: VERY DIFFICULT
SUM OF ALL RESPONSES TO PHQ QUESTIONS 1-9: 17

## 2024-05-02 ENCOUNTER — VIRTUAL VISIT (OUTPATIENT)
Dept: FAMILY MEDICINE | Facility: CLINIC | Age: 34
End: 2024-05-02
Attending: FAMILY MEDICINE
Payer: COMMERCIAL

## 2024-05-02 DIAGNOSIS — F41.1 GAD (GENERALIZED ANXIETY DISORDER): Primary | ICD-10-CM

## 2024-05-02 PROCEDURE — 99214 OFFICE O/P EST MOD 30 MIN: CPT | Mod: 95 | Performed by: FAMILY MEDICINE

## 2024-05-02 RX ORDER — SERTRALINE HYDROCHLORIDE 100 MG/1
100 TABLET, FILM COATED ORAL DAILY
Qty: 90 TABLET | Refills: 1 | Status: SHIPPED | OUTPATIENT
Start: 2024-05-02 | End: 2024-05-20

## 2024-05-02 RX ORDER — NORTRIPTYLINE HCL 25 MG
25-50 CAPSULE ORAL AT BEDTIME
Qty: 90 CAPSULE | Refills: 0 | Status: SHIPPED | OUTPATIENT
Start: 2024-05-02 | End: 2024-05-20

## 2024-05-02 NOTE — PROGRESS NOTES
"Tapan is a 33 year old who is being evaluated via a billable video visit.    How would you like to obtain your AVS? [employer]  If the video visit is dropped, the invitation should be resent by: Text to cell phone: [phone]  Will anyone else be joining your video visit? No      Instructions Relayed to Patient by Virtual Roomer:     Patient is active on [employer]:   Relayed following to patient: \"It looks like you are active on [employer], are you able to join the visit this way? If not, do you need us to send you a link now or would you like your provider to send a link via text or email when they are ready to initiate the visit?\"      Patient Confirmed they will join visit via: [employer]  Reminded patient to ensure they were logged on to virtual visit by arrival time listed.   Asked if patient has flexibility to initiate visit sooner than arrival time: patient stated yes, documented in appointment notes availability to initiate visit earlier than arrival time     If pediatric virtual visit, ensured pediatric patient along with parent/guardian will be present for video visit.     Patient offered the website [email or web address] and/or phone number to [employer] Help line: [phone]   Assessment & Plan     OCTAVIO (generalized anxiety disorder)  Needs improvement  New medications  started by another provider, gabapentin and prazosin. Discontinued by patient due to intolerance. AM anxiety, unable to leave home today for work, symptoms daily. Improved slightly by lorazepam. Verified no suicidal thoughts or ideation at this time. Still seeing a therapist at [facility]. Appointment scheduled for ADD testing. In the past no improvement with venlafaxine, vibryd or Wellbutrin.  Trial of sertraline and Pamelor to help with anxiety and insomnia. Hold paroxetine and trazodone.   - sertraline (ZOLOFT) 100 MG tablet; Take 1 tablet (100 mg) by mouth daily  - nortriptyline (PAMELOR) 25 MG capsule; Take 1-2 capsules (25-50 mg) by " mouth at bedtime            Nadine Phelan is a 33 year old, presenting for the following health issues:  No chief complaint on file.        5/2/2024     7:25 AM   Additional Questions   Roomed by Mary Ellen   Accompanied by self     Depression and Anxiety   How are you doing with your depression since your last visit? Worsened   How are you doing with your anxiety since your last visit?  Worsened   Are you having other symptoms that might be associated with depression or anxiety? Yes:     Have you had a significant life event? No   Do you have any concerns with your use of alcohol or other drugs? No    Social History     Tobacco Use    Smoking status: Never     Passive exposure: Never    Smokeless tobacco: Never    Tobacco comments:     no nicotine exposure   Vaping Use    Vaping status: Never Used   Substance Use Topics    Alcohol use: No    Drug use: No         1/30/2024     8:41 AM 3/28/2024     8:04 PM 5/1/2024    10:53 AM   PHQ   PHQ-9 Total Score 14 14 17   Q9: Thoughts of better off dead/self-harm past 2 weeks Not at all Not at all Not at all         1/9/2023     9:14 AM 3/28/2024     8:04 PM 5/1/2024    10:54 AM   OCTAVIO-7 SCORE   Total Score 17 (severe anxiety) 20 (severe anxiety) 16 (severe anxiety)   Total Score 17 20 16              Review of Systems  Constitutional, neuro, ENT, endocrine, pulmonary, cardiac, gastrointestinal, genitourinary, musculoskeletal, integument and psychiatric systems are negative, except as otherwise noted.      Objective           Vitals:  No vitals were obtained today due to virtual visit.    Physical Exam   GENERAL: alert and no distress  EYES: Eyes grossly normal to inspection.  No discharge or erythema, or obvious scleral/conjunctival abnormalities.  RESP: No audible wheeze, cough, or visible cyanosis.    SKIN: Visible skin clear. No significant rash, abnormal pigmentation or lesions.  NEURO: Cranial nerves grossly intact.  Mentation and speech appropriate for age.  PSYCH:  mentation appears normal, anxious, judgement and insight intact, and appearance well groomed      Video-Visit Details    Type of service:  Video Visit   Originating Location (pt. Location): Home    Distant Location (provider location):  Off-site  Platform used for Video Visit: Davi  Signed Electronically by: Humberto Altamirano MD    Answers submitted by the patient for this visit:  Patient Health Questionnaire (Submitted on 5/1/2024)  If you checked off any problems, how difficult have these problems made it for you to do your work, take care of things at home, or get along with other people?: Very difficult  PHQ9 TOTAL SCORE: 17  OCTAVIO-7 (Submitted on 5/1/2024)  OCTAVIO 7 TOTAL SCORE: 16  Depression / Anxiety Questionnaire (Submitted on 5/1/2024)  Chief Complaint: Chronic problems general questions HPI Form  Depression/Anxiety: Anxiety  Anxiety only (Submitted on 5/1/2024)  Chief Complaint: Chronic problems general questions HPI Form  Anxiety since last: : no change  Other associated symotome: : Yes  Significant life event: : job concerns  Anxious:: Yes  Current substance use:: No  General Questionnaire (Submitted on 5/1/2024)  Chief Complaint: Chronic problems general questions HPI Form  How many servings of fruits and vegetables do you eat daily?: 4 or more  On average, how many sweetened beverages do you drink each day (Examples: soda, juice, sweet tea, etc.  Do NOT count diet or artificially sweetened beverages)?: 0  How many minutes a day do you exercise enough to make your heart beat faster?: 30 to 60  How many days a week do you exercise enough to make your heart beat faster?: 6  How many days per week do you miss taking your medication?: 0

## 2024-05-03 ENCOUNTER — MYC MEDICAL ADVICE (OUTPATIENT)
Dept: FAMILY MEDICINE | Facility: CLINIC | Age: 34
End: 2024-05-03
Payer: COMMERCIAL

## 2024-05-03 ENCOUNTER — TELEPHONE (OUTPATIENT)
Dept: FAMILY MEDICINE | Facility: CLINIC | Age: 34
End: 2024-05-03
Payer: COMMERCIAL

## 2024-05-03 NOTE — TELEPHONE ENCOUNTER
Patients reply:    Tapan Oh  You22 minutes ago (4:31 PM)       Good afternoon Tanna!     Correct, I sent it back unused after speaking with a nurse and someone from [facility] that I can't use it during strenuous workouts, so I didn't it was necessary for my anxiety. They also said i wouldn't be charged!

## 2024-05-03 NOTE — TELEPHONE ENCOUNTER
Patient did virtual visit with Dr Mora on 4/8/2024 and Zio patch for mail delivery was ordered. Informed via [employer] email today that the monitor was returned unused.         Provider-do you want us to reach out to see why patient returned it or have patient come in to have one placed on the MA or RN schedule?     Lulu Hernandez CMA ([facility])

## 2024-05-13 ENCOUNTER — VIRTUAL VISIT (OUTPATIENT)
Dept: CONSULT | Facility: CLINIC | Age: 34
End: 2024-05-13
Attending: FAMILY MEDICINE
Payer: COMMERCIAL

## 2024-05-13 DIAGNOSIS — T50.905D ADVERSE EFFECT OF DRUG, SUBSEQUENT ENCOUNTER: ICD-10-CM

## 2024-05-13 DIAGNOSIS — Z13.71 ENCOUNTER FOR NONPROCREATIVE GENETIC COUNSELING AND TESTING: ICD-10-CM

## 2024-05-13 DIAGNOSIS — Z78.9 LACK OF DRUG EFFECT (PROPERLY PRESCRIBED AND ADMINISTERED): Primary | ICD-10-CM

## 2024-05-13 DIAGNOSIS — F41.1 GAD (GENERALIZED ANXIETY DISORDER): Chronic | ICD-10-CM

## 2024-05-13 DIAGNOSIS — Z71.83 ENCOUNTER FOR NONPROCREATIVE GENETIC COUNSELING AND TESTING: ICD-10-CM

## 2024-05-13 DIAGNOSIS — F33.1 MODERATE EPISODE OF RECURRENT MAJOR DEPRESSIVE DISORDER (H): Chronic | ICD-10-CM

## 2024-05-13 PROCEDURE — 96040 HC GENETIC COUNSELING, EACH 30 MINUTES: CPT | Mod: GT,95

## 2024-05-13 NOTE — LETTER
"5/13/2024      RE: Julio Oh  [address] [place] [address]     Dear Colleague,    Thank you for the opportunity to participate in the care of your patient, Julio Oh, at the [facility] at [facility]. Please see a copy of my visit note below.    GENETIC COUNSELING CONSULTATION NOTE    Date of visit: 05/13/24    Presenting Information:   Julio \"Tapan\" Althea is a 33 year old male referred to the [facility] at the request of Dr. Humberto Altamirano today. Tapan was seen for a genetic counseling appointment via video visit today to discuss the details of pharmacogenomic testing and to coordinate this testing.     Tapan has been on a number of mental health and sleep medications in the past. For 14 years he was taking paxil and paroxetine but recently started to have debilitating AM anxiety daily. He began taking his as needed lorazepam daily six weeks ago and is concerned for physical dependence. A provider started him on gabapentin and prazosin which he took for several days and discontinued due to lack of effect. In the past he has not found relief from venlafaxine, vibryd (vilazodone) or wellbutrin (buproprion). In particular, the wellbutrin made it hard to fall asleep and vilazodone caused suicidal ideation. He is on now on day 11 of a sertraline trial and is taking trazodone for sleep.     Current Medications:  Current Outpatient Medications   Medication Sig Dispense Refill     LORazepam (ATIVAN) 1 MG tablet Take 0.5-1 tablets (0.5-1 mg) by mouth every 8 hours as needed for anxiety TAKE 1/2 TO 1 TABLET BY MOUTH THREE TIMES A DAY AS NEEDED FOR ANXIETY 60 tablet 2     magnesium oxide 400 MG CAPS Take 1 tablet by mouth daily       nortriptyline (PAMELOR) 25 MG capsule Take 1-2 capsules (25-50 mg) by mouth at bedtime 90 capsule 0     Omega-3 Fatty Acids (FISH OIL) 1200 MG capsule Take 1,200 mg " by mouth daily       sertraline (ZOLOFT) 100 MG tablet Take 1 tablet (100 mg) by mouth daily 90 tablet 1     Turmeric (QC TUMERIC COMPLEX PO)        Vitamin D, Cholecalciferol, 25 MCG (1000 UT) CAPS Take 1 tablet by mouth daily       No current facility-administered medications for this visit.      Family History: A three generation pedigree was obtained and scanned into the electronic medical record. The relevant portions are described below:    Children- None  Siblings- Older sister who has maybe been on mental health medications in the past but is not currently taking them. She has 3 children who are well.  Parents- Father has been on an antidepressant for much of his life (paxil much of the time). He also had colon cancer in his late 50s. Tapan was encouraged to share this colon cancer history with his providers to ensure he has appropriate screening given the family history. Mother is well.  Maternal Relatives- Two uncles, two aunts, cousins, all well. Grandparents .  Paternal Relatives- Father's siblings with substance use disorder and presumed anxiety/depression/mental health concerns. Grandparents .    Family history is otherwise largely non-contributory.    Genetic Counseling Discussion:  For review, our bodies are made of cells that contain our chromosomes which are made up of long stretches of DNA containing our genes. Our genes serve as the instructions for our bodies to grow and function. We have two copies of each gene, one inherited from our mother and one inherited from our father.     What pharmacogenomic testing can tell us:  There are several factors that can determine how an individual responds to medications. Some of these factors include environmental factors such as lifestyle choices (diet, alcohol and/or tobacco use) or other medications an individual might be taking, and other factors can include a person's age, sex, ethnic background, disease, and underlying genetic factors.  There are several genes in our body that provide instructions for breaking down the medications we take. Changes in these genes (sometimes called variants or polymorphisms/SNPs) can alter how the body breaks down certain medications. For example, a change in a gene can slow down the process of medication breakdown leading to too much of that medication/drug in the body which can cause side effects. On the other hand, a change in a gene can speed up the breakdown of a medication so a therapeutic level is not reached and the medication may not work well at the standard doses. Therefore, identifying changes in these genes via pharmacogenomic testing can help guide which medications might work best for an individual, what dose of a medication may be best, or if a certain medication has a high risk for causing serious side effects.     The pharmacogenomic testing offered at [facility] analyzes several different polymorphisms in different genes associated with drug metabolism. These variants have been determined to be medically actionable by practice guidelines including CPIC (Clinical Pharmacogenetics Implementation Consortium), PharmGKB and/or FDA gene-drug interaction guidelines. Therefore, prescribing recommendations can be made by the results of this test, so this testing for Tapan is DIAGNOSTIC and is NOT investigational.     The results of this test can provide guidance for several different types of drugs such as antiinflammatories, antithrombotics, lipid-lowering medication, acid-lowering medications, antiemetics, immunosuppressants, antivirals, antifungals, antidepressants, ADHD medications, antimetabolites, and/or hormone antagonists. This means that, while this testing was recommended for a specific reason right now (Tapan's mental health/sleep), it may provide guidance for future medication use.     As previously mentioned, we inherit our genes from our parents. This means that some of the  pharmacogenomic variants found on this test may be shared by other relatives. These results could be helpful to share with other relatives, but it is important to remember that there are many factors that can contribute to how an individual responds to medications. Relatives should consider their own pharmacogenomics testing to better determine their recommendations and they should consult with their health care providers before making any changes.     What pharmacogenomic testing cannot tell us:  This pharmacogenomic testing is not looking at every known pharmacogenomic polymorphism and therefore the results cannot tell us about every possible gene-drug interaction. It is also not looking at genes outside of the scope of pharmacogenomics, and therefore, the results will not tell us if Tapan is at risk for other genetic conditions. If Tapan has questions about a possible underlying genetic condition, they should talk with their primary care provider about seeking an appointment in our Genetics Clinic.     Testing logistics:  [facility] will try to obtain insurance coverage for the pharmacogenomic testing, however this is not always a covered service. A cost waiver form (Medicare replacement non-coverage form) is required, but cost to the patient is not expected to exceed $250.     A blood or buccal sample will be collected for DNA analysis. The results of this test take 1-2 weeks. An appointment will be made with the pharmacist to discuss these results in detail and to discuss the medication recommendations based on these results. These test results will be accessible in [employer] and may be viewable prior to the appointment with the pharmacist, but NO CHANGES SHOULD BE MADE TO MEDICATIONS BEFORE TALKING TO THE PHARMACIST OR HEALTHCARE PROVIDER.     Tapan consented to pharmacogenomic testing today. The insurance and billing were explained and Tapan agreed to the billing plan.I will email Tapan a docusign to sign the  genetic testing consent form and the cost waiver form (Medicare replacement non-coverage form).    It was a pleasure meeting Tapan today. They was encouraged to reach out to me if they have any further questions.     Plan:  I will send consent forms to Tapan via [employer]; his email is [email or web address]  Tapan will arrange a lab appointment at a [facility] laboratory to have a blood sample drawn for the [facility] Pharmacogenomic Test  Tapan was given the phone number to call to make the results appointment with the [facility] pharmacist ([phone]). Tapan was instructed to call to schedule once they have their blood drawn. If Tapan does not call to schedule, someone will reach out to schedule when the Pharmacogenomics Test is completed.     Cornelia Russell MS, [facility]  Genetic Counseling  [facility]  Email: [email or web address]  Phone: [phone]  Fax: [phone]      Time spent in consultation face to face was approximately 25 minutes.    Virtual Visit Details    Type of service:  Video Visit   Originating Location (pt. Location): Home  Distant Location (provider location):  On-site  Platform used for Video Visit: Davi

## 2024-05-13 NOTE — PATIENT INSTRUCTIONS
Plan:  I will send consent forms to Tapan via [employer]; his email is [email or web address]  Tapan will arrange a lab appointment at a [facility] laboratory to have a blood sample drawn for the [facility] Pharmacogenomic Test  Tapan was given the phone number to call to make the results appointment with the [facility] pharmacist ([phone]). Tapan was instructed to call to schedule once they have their blood drawn. If Tapan does not call to schedule, someone will reach out to schedule when the Pharmacogenomics Test is completed.     Cornelia Russell MS, [facility]  Genetic Counseling  [facility]  Email: [email or web address]  Phone: [phone]  Fax: [phone]

## 2024-05-13 NOTE — PROGRESS NOTES
"GENETIC COUNSELING CONSULTATION NOTE    Date of visit: 05/13/24    Presenting Information:   Julio \"Tapan\" Althea is a 33 year old male referred to the [facility] at the request of Dr. Humberto Altamirano today. Tapan was seen for a genetic counseling appointment via video visit today to discuss the details of pharmacogenomic testing and to coordinate this testing.     Tapan has been on a number of mental health and sleep medications in the past. For 14 years he was taking paxil and paroxetine but recently started to have debilitating AM anxiety daily. He began taking his as needed lorazepam daily six weeks ago and is concerned for physical dependence. A provider started him on gabapentin and prazosin which he took for several days and discontinued due to lack of effect. In the past he has not found relief from venlafaxine, vibryd (vilazodone) or wellbutrin (buproprion). In particular, the wellbutrin made it hard to fall asleep and vilazodone caused suicidal ideation. He is on now on day 11 of a sertraline trial and is taking trazodone for sleep.     Current Medications:  Current Outpatient Medications   Medication Sig Dispense Refill    LORazepam (ATIVAN) 1 MG tablet Take 0.5-1 tablets (0.5-1 mg) by mouth every 8 hours as needed for anxiety TAKE 1/2 TO 1 TABLET BY MOUTH THREE TIMES A DAY AS NEEDED FOR ANXIETY 60 tablet 2    magnesium oxide 400 MG CAPS Take 1 tablet by mouth daily      nortriptyline (PAMELOR) 25 MG capsule Take 1-2 capsules (25-50 mg) by mouth at bedtime 90 capsule 0    Omega-3 Fatty Acids (FISH OIL) 1200 MG capsule Take 1,200 mg by mouth daily      sertraline (ZOLOFT) 100 MG tablet Take 1 tablet (100 mg) by mouth daily 90 tablet 1    Turmeric (QC TUMERIC COMPLEX PO)       Vitamin D, Cholecalciferol, 25 MCG (1000 UT) CAPS Take 1 tablet by mouth daily       No current facility-administered medications for this visit.      Family History: A three generation pedigree was obtained and " scanned into the electronic medical record. The relevant portions are described below:    Children- None  Siblings- Older sister who has maybe been on mental health medications in the past but is not currently taking them. She has 3 children who are well.  Parents- Father has been on an antidepressant for much of his life (paxil much of the time). He also had colon cancer in his late 50s. Tapan was encouraged to share this colon cancer history with his providers to ensure he has appropriate screening given the family history. Mother is well.  Maternal Relatives- Two uncles, two aunts, cousins, all well. Grandparents .  Paternal Relatives- Father's siblings with substance use disorder and presumed anxiety/depression/mental health concerns. Grandparents .    Family history is otherwise largely non-contributory.    Genetic Counseling Discussion:  For review, our bodies are made of cells that contain our chromosomes which are made up of long stretches of DNA containing our genes. Our genes serve as the instructions for our bodies to grow and function. We have two copies of each gene, one inherited from our mother and one inherited from our father.     What pharmacogenomic testing can tell us:  There are several factors that can determine how an individual responds to medications. Some of these factors include environmental factors such as lifestyle choices (diet, alcohol and/or tobacco use) or other medications an individual might be taking, and other factors can include a person's age, sex, ethnic background, disease, and underlying genetic factors. There are several genes in our body that provide instructions for breaking down the medications we take. Changes in these genes (sometimes called variants or polymorphisms/SNPs) can alter how the body breaks down certain medications. For example, a change in a gene can slow down the process of medication breakdown leading to too much of that medication/drug  in the body which can cause side effects. On the other hand, a change in a gene can speed up the breakdown of a medication so a therapeutic level is not reached and the medication may not work well at the standard doses. Therefore, identifying changes in these genes via pharmacogenomic testing can help guide which medications might work best for an individual, what dose of a medication may be best, or if a certain medication has a high risk for causing serious side effects.     The pharmacogenomic testing offered at [facility] analyzes several different polymorphisms in different genes associated with drug metabolism. These variants have been determined to be medically actionable by practice guidelines including CPIC (Clinical Pharmacogenetics Implementation Consortium), PharmGKB and/or FDA gene-drug interaction guidelines. Therefore, prescribing recommendations can be made by the results of this test, so this testing for Tapan is DIAGNOSTIC and is NOT investigational.     The results of this test can provide guidance for several different types of drugs such as antiinflammatories, antithrombotics, lipid-lowering medication, acid-lowering medications, antiemetics, immunosuppressants, antivirals, antifungals, antidepressants, ADHD medications, antimetabolites, and/or hormone antagonists. This means that, while this testing was recommended for a specific reason right now (Tapan's mental health/sleep), it may provide guidance for future medication use.     As previously mentioned, we inherit our genes from our parents. This means that some of the pharmacogenomic variants found on this test may be shared by other relatives. These results could be helpful to share with other relatives, but it is important to remember that there are many factors that can contribute to how an individual responds to medications. Relatives should consider their own pharmacogenomics testing to better determine their recommendations and  they should consult with their health care providers before making any changes.     What pharmacogenomic testing cannot tell us:  This pharmacogenomic testing is not looking at every known pharmacogenomic polymorphism and therefore the results cannot tell us about every possible gene-drug interaction. It is also not looking at genes outside of the scope of pharmacogenomics, and therefore, the results will not tell us if Tapan is at risk for other genetic conditions. If Tapan has questions about a possible underlying genetic condition, they should talk with their primary care provider about seeking an appointment in our Genetics Clinic.     Testing logistics:  [facility] will try to obtain insurance coverage for the pharmacogenomic testing, however this is not always a covered service. A cost waiver form (Medicare replacement non-coverage form) is required, but cost to the patient is not expected to exceed $250.     A blood or buccal sample will be collected for DNA analysis. The results of this test take 1-2 weeks. An appointment will be made with the pharmacist to discuss these results in detail and to discuss the medication recommendations based on these results. These test results will be accessible in [facility] and may be viewable prior to the appointment with the pharmacist, but NO CHANGES SHOULD BE MADE TO MEDICATIONS BEFORE TALKING TO THE PHARMACIST OR HEALTHCARE PROVIDER.     Tapan consented to pharmacogenomic testing today. The insurance and billing were explained and Tapan agreed to the billing plan.I will email Tapan a [email or web address] to sign the genetic testing consent form and the cost waiver form (Medicare replacement non-coverage form).    It was a pleasure meeting Tapan today. They was encouraged to reach out to me if they have any further questions.     Plan:  I will send consent forms to Tapan via [employer]; his email is [email or web address]  Tapan will arrange a lab appointment at a [facility] laboratory to  have a blood sample drawn for the [facility] Pharmacogenomic Test  Tapan was given the phone number to call to make the results appointment with the [facility] pharmacist ([phone]). Tapan was instructed to call to schedule once they have their blood drawn. If Tapan does not call to schedule, someone will reach out to schedule when the Pharmacogenomics Test is completed.     Cornelia Russell MS, [facility]  Genetic Counseling  [facility]  Email: [email or web address]  Phone: [phone]  Fax: [phone]      Time spent in consultation face to face was approximately 25 minutes.    Virtual Visit Details    Type of service:  Video Visit   Originating Location (pt. Location): Home  Distant Location (provider location):  On-site  Platform used for Video Visit: Davi

## 2024-05-14 ENCOUNTER — LAB (OUTPATIENT)
Dept: LAB | Facility: CLINIC | Age: 34
End: 2024-05-14
Payer: COMMERCIAL

## 2024-05-14 DIAGNOSIS — F33.1 MODERATE EPISODE OF RECURRENT MAJOR DEPRESSIVE DISORDER (H): Chronic | ICD-10-CM

## 2024-05-14 DIAGNOSIS — Z78.9 LACK OF DRUG EFFECT (PROPERLY PRESCRIBED AND ADMINISTERED): ICD-10-CM

## 2024-05-14 DIAGNOSIS — Z13.71 ENCOUNTER FOR NONPROCREATIVE GENETIC COUNSELING AND TESTING: ICD-10-CM

## 2024-05-14 DIAGNOSIS — F41.1 GAD (GENERALIZED ANXIETY DISORDER): Chronic | ICD-10-CM

## 2024-05-14 DIAGNOSIS — T50.905D ADVERSE EFFECT OF DRUG, SUBSEQUENT ENCOUNTER: ICD-10-CM

## 2024-05-14 DIAGNOSIS — Z71.83 ENCOUNTER FOR NONPROCREATIVE GENETIC COUNSELING AND TESTING: ICD-10-CM

## 2024-05-14 PROCEDURE — G0452 MOLECULAR PATHOLOGY INTERPR: HCPCS | Mod: 26 | Performed by: PATHOLOGY

## 2024-05-14 PROCEDURE — 36415 COLL VENOUS BLD VENIPUNCTURE: CPT

## 2024-05-14 PROCEDURE — 81418 RX METAB GEN SEQ ALYS PNL 6: CPT | Performed by: FAMILY MEDICINE

## 2024-05-20 RX ORDER — SERTRALINE HYDROCHLORIDE 100 MG/1
150 TABLET, FILM COATED ORAL DAILY
Qty: 135 TABLET | Refills: 1 | Status: SHIPPED | OUTPATIENT
Start: 2024-05-20 | End: 2024-06-21

## 2024-05-23 LAB
GO4PGX COMMENTS: NORMAL
GO4PGX DISCLAIMER: NORMAL
GO4PGX LIMITATIONS: NORMAL
GO4PGX METHODOLOGY: NORMAL
LAB DIRECTOR RESULTS: NORMAL
PGX ABOUT THE TEST: NORMAL
PGX VARIANTS: NORMAL

## 2024-05-25 ENCOUNTER — HEALTH MAINTENANCE LETTER (OUTPATIENT)
Age: 34
End: 2024-05-25

## 2024-05-28 NOTE — PROGRESS NOTES
"Medication Therapy Management (MTM) Encounter    ASSESSMENT:                            Medication Adherence/Access: No issues identified    Mental Health   Anxiety and Depression:   Pharmacogenetic (PGx) Results: See \"Pharmacogenomics Profile\" in lab result tab for complete list of pharmacogenetic results.       CYP2B6 intermediate metabolizer: decreased CYP2B6 function  CSK3I54  rapid metabolizer: increased DNR4O33 function  CYP2C9 normal metabolizer: normal CYP2C9 function  CYP2D6 normal metabolizer: normal CYP2D6 function  QDEA2E4 normal function    CYP2B6 Intermediate Metabolizer  a. Expected to have somewhat increased levels of medications metabolized by CYP2B6 (e.g., efavirenz) and may be at increased risk of side effects.  b. Current medications affected: sertraline, but recommend normal dosing based on CYP2B6/JZU3T04 polymorphism combination      ANH4Q56 Rapid Metabolizer  a. Expected to have decreased levels of medications metabolized by YOF0I06 (e.g., tertiary TCAs, sertraline, escitalopram, citalopram, PPIs, voriconazole ) and may be at increased risk for lack of effiacy. Expected to have somewhat increased levels of medications activated by DKN4S25 and may be at increased risk for side effects.   b. Current medications affected: sertraline, but recommend normal dosing based on CYP2B6/QEW5T30 polymorphism combination    Pharmacogenetic Assessment and Recommendations:   Overall, no recommendations to dose sertraline differently based on pharmacogenetic (PGx) testing. Discussed option to increase to 200mg as previously recommended by pcp. Reviewed timeline for efficacy, target symptoms, limiting ativan as able.           PLAN:                            Ok to increase sertraline to 200mg daily as previously recommended by PCP.     Follow-up: PCP 6/6; MTM 4-6 weeks    SUBJECTIVE/OBJECTIVE:                          Tapan Oh is a 33 year old male contacted via secure video for a follow-up visit.       Reason " for visit: pharmacogenetic (PGx) results review.    Allergies/ADRs: Reviewed in chart  Past Medical History: Reviewed in chart  Tobacco: He reports that he has never smoked. He has never been exposed to tobacco smoke. He has never used smokeless tobacco.  Alcohol: did not discuss    Medication Adherence/Access: no issues reported    Mental Health   Anxiety and Depression  Sertraline 150mg once daily   Lorazepam 0.5-1mg three times daily as needed   Trazodone 50mg nightly    Today, patient reports worsening anxiety and panic symptoms. He had been on Paxil for 14 years.  Late march 2024 started having panic attacks around 3-5am before waking up and was needing to take ativan daily. Currently taking ativan 0.25-0.5mg daily. At 5/2/24 appt with PCP -  medication direct switch from Paxil 50mg to sertraline 100mg. On 5/12/24 increased to sertraline 150mg. Has good night routine for sleep, feels he is sleeping well until morning when he wakes up with anxiety. Meditation can sometimes help, but anxiety typically lasts throughout the day and into the evening. Possibly has some decreased sex drive with sertraline. Current stressors - 3 months into new job that isn't sure is a good fit.        Past Psychotropic Medications        Medication Max Dose (mg) Dates / Duration Helpful? DC Reason / Adverse Effects?   bupropion 150mg  N insomnia   buspar 10mg bid March 2024  Insomnia?   venlafaxine 37.5mg 2018- short trial  Stopped due to worsening anxiety   Paxil 50mg [id number]  Wore off                                                          ----------------      I spent 40 minutes with this patient today. A copy of the visit note was provided to the patient's provider(s).    A summary of these recommendations was sent via [employer].    Barbara Joyner, PharmD, BCPP  Medication Therapy Management Pharmacist  [facility]      Telemedicine Visit Details  Type of service:  Video Conference via [employer]  Time:  840am  End Time:  910am     Medication Therapy Recommendations  No medication therapy recommendations to display

## 2024-05-29 ENCOUNTER — VIRTUAL VISIT (OUTPATIENT)
Dept: PHARMACY | Facility: CLINIC | Age: 34
End: 2024-05-29
Payer: COMMERCIAL

## 2024-05-29 ENCOUNTER — MYC MEDICAL ADVICE (OUTPATIENT)
Dept: PHARMACY | Facility: CLINIC | Age: 34
End: 2024-05-29
Payer: COMMERCIAL

## 2024-05-29 DIAGNOSIS — F41.1 GAD (GENERALIZED ANXIETY DISORDER): ICD-10-CM

## 2024-05-29 DIAGNOSIS — F33.0 MILD EPISODE OF RECURRENT MAJOR DEPRESSIVE DISORDER (H): Primary | ICD-10-CM

## 2024-05-29 DIAGNOSIS — Z13.79 ENCOUNTER FOR PHARMACOGENETIC TESTING: ICD-10-CM

## 2024-05-29 PROBLEM — E88.89 CYP2B6 INTERMEDIATE METABOLIZER (H): Status: ACTIVE | Noted: 2024-05-29

## 2024-05-29 PROBLEM — E88.89 CYP2C19 RAPID METABOLIZER (H): Status: ACTIVE | Noted: 2024-05-29

## 2024-05-29 PROCEDURE — 99207 PR NO CHARGE LOS: CPT | Mod: 95 | Performed by: PHARMACIST

## 2024-05-29 RX ORDER — TRAZODONE HYDROCHLORIDE 50 MG/1
50 TABLET, FILM COATED ORAL AT BEDTIME
COMMUNITY
End: 2024-08-30

## 2024-05-29 NOTE — PATIENT INSTRUCTIONS
"Recommendations from today's MTM visit:                                                      Ok to increase sertraline to 200mg daily as previously recommended by PCP.     Follow-up: PCP 6/6; MTM 4-6 weeks    It was great speaking with you today.  I value your experience and would be very thankful for your time in providing feedback in our clinic survey. In the next few days, you may receive an email or text message from [facility] with a link to a survey related to your  clinical pharmacist.\"     To schedule another MTM appointment, please call the clinic directly or you may call the MTM scheduling line at [phone] or toll-free at [phone].     My Clinical Pharmacist's contact information:                                                      Please feel free to contact me with any questions or concerns you have.      Barbara Joyner, PharmD, BCPP  Medication Therapy Management Pharmacist  [facility]    "

## 2024-05-29 NOTE — Clinical Note
Hello,   I met with patient to review pharmacogenetic (PGx) results. Please see note for summary of results.  Thank you,   Barbara Joyner, PharmD, BCPP Medication Therapy Management Pharmacist [facility]

## 2024-05-30 ENCOUNTER — VIRTUAL VISIT (OUTPATIENT)
Dept: FAMILY MEDICINE | Facility: CLINIC | Age: 34
End: 2024-05-30
Payer: COMMERCIAL

## 2024-05-30 DIAGNOSIS — F41.1 GAD (GENERALIZED ANXIETY DISORDER): Primary | ICD-10-CM

## 2024-05-30 DIAGNOSIS — F33.1 MODERATE EPISODE OF RECURRENT MAJOR DEPRESSIVE DISORDER (H): ICD-10-CM

## 2024-05-30 DIAGNOSIS — R41.840 INATTENTION: ICD-10-CM

## 2024-05-30 PROCEDURE — 99443 PR PHYSICIAN TELEPHONE EVALUATION 21-30 MIN: CPT | Performed by: FAMILY MEDICINE

## 2024-05-30 RX ORDER — PROPRANOLOL HYDROCHLORIDE 10 MG/1
TABLET ORAL
Qty: 60 TABLET | Refills: 1 | Status: SHIPPED | OUTPATIENT
Start: 2024-05-30

## 2024-05-30 ASSESSMENT — PATIENT HEALTH QUESTIONNAIRE - PHQ9
SUM OF ALL RESPONSES TO PHQ QUESTIONS 1-9: 11
10. IF YOU CHECKED OFF ANY PROBLEMS, HOW DIFFICULT HAVE THESE PROBLEMS MADE IT FOR YOU TO DO YOUR WORK, TAKE CARE OF THINGS AT HOME, OR GET ALONG WITH OTHER PEOPLE: VERY DIFFICULT
SUM OF ALL RESPONSES TO PHQ QUESTIONS 1-9: 11
SUM OF ALL RESPONSES TO PHQ QUESTIONS 1-9: 11

## 2024-05-30 ASSESSMENT — ANXIETY QUESTIONNAIRES
5. BEING SO RESTLESS THAT IT IS HARD TO SIT STILL: NEARLY EVERY DAY
5. BEING SO RESTLESS THAT IT IS HARD TO SIT STILL: NEARLY EVERY DAY
GAD7 TOTAL SCORE: 17
GAD7 TOTAL SCORE: 17
6. BECOMING EASILY ANNOYED OR IRRITABLE: SEVERAL DAYS
7. FEELING AFRAID AS IF SOMETHING AWFUL MIGHT HAPPEN: MORE THAN HALF THE DAYS
2. NOT BEING ABLE TO STOP OR CONTROL WORRYING: NEARLY EVERY DAY
IF YOU CHECKED OFF ANY PROBLEMS ON THIS QUESTIONNAIRE, HOW DIFFICULT HAVE THESE PROBLEMS MADE IT FOR YOU TO DO YOUR WORK, TAKE CARE OF THINGS AT HOME, OR GET ALONG WITH OTHER PEOPLE: VERY DIFFICULT
4. TROUBLE RELAXING: MORE THAN HALF THE DAYS
8. IF YOU CHECKED OFF ANY PROBLEMS, HOW DIFFICULT HAVE THESE MADE IT FOR YOU TO DO YOUR WORK, TAKE CARE OF THINGS AT HOME, OR GET ALONG WITH OTHER PEOPLE?: VERY DIFFICULT
6. BECOMING EASILY ANNOYED OR IRRITABLE: SEVERAL DAYS
4. TROUBLE RELAXING: MORE THAN HALF THE DAYS
IF YOU CHECKED OFF ANY PROBLEMS ON THIS QUESTIONNAIRE, HOW DIFFICULT HAVE THESE PROBLEMS MADE IT FOR YOU TO DO YOUR WORK, TAKE CARE OF THINGS AT HOME, OR GET ALONG WITH OTHER PEOPLE: VERY DIFFICULT
GAD7 TOTAL SCORE: 17
2. NOT BEING ABLE TO STOP OR CONTROL WORRYING: NEARLY EVERY DAY
3. WORRYING TOO MUCH ABOUT DIFFERENT THINGS: NEARLY EVERY DAY
7. FEELING AFRAID AS IF SOMETHING AWFUL MIGHT HAPPEN: MORE THAN HALF THE DAYS
3. WORRYING TOO MUCH ABOUT DIFFERENT THINGS: NEARLY EVERY DAY
1. FEELING NERVOUS, ANXIOUS, OR ON EDGE: NEARLY EVERY DAY
1. FEELING NERVOUS, ANXIOUS, OR ON EDGE: NEARLY EVERY DAY
7. FEELING AFRAID AS IF SOMETHING AWFUL MIGHT HAPPEN: MORE THAN HALF THE DAYS

## 2024-05-30 ASSESSMENT — ENCOUNTER SYMPTOMS: NERVOUS/ANXIOUS: 1

## 2024-05-30 NOTE — TELEPHONE ENCOUNTER
Please add the patient to my schedule for a telephone visit for 1:15 PM arrive time for OCTAVIO and MDD f/up. Thank you.  Humberto Altamirano MD on 5/30/2024 at 9:17 AM

## 2024-05-30 NOTE — PROGRESS NOTES
Tapan is a 33 year old who is being evaluated via a billable telephone visit.    What phone number would you like to be contacted at? [phone]   How would you like to obtain your AVS? Aramis  Originating Location (pt. Location): Home    Distant Location (provider location):  Off-site    Assessment & Plan     OCTAVIO (generalized anxiety disorder)  Needs improvement, continue with [facility] as scheduled, continue current coping skills of exercise, meditation, cognitive therapy and deep breathing  Sertraline to be continued at 200 mg, reviewed side effects to monitor.  - propranolol (INDERAL) 10 MG tablet; 10 or 20 mg administered 30 to 60 minutes prior to anxiety-provoking situation    Moderate episode of recurrent major depressive disorder (H)  As #1  - propranolol (INDERAL) 10 MG tablet; 10 or 20 mg administered 30 to 60 minutes prior to anxiety-provoking situation    Inattention  The patient has ADHD screen via [employer] 07/23/2024.  - propranolol (INDERAL) 10 MG tablet; 10 or 20 mg administered 30 to 60 minutes prior to anxiety-provoking situation          Subjective   Tapan is a 33 year old, presenting for the following health issues:  Anxiety and Depression      5/30/2024    11:45 AM   Additional Questions   Roomed by Jena         5/30/2024    11:45 AM   Patient Reported Additional Medications   Patient reports taking the following new medications none     Anxiety    History of Present Illness       Mental Health Follow-up:  Patient presents to follow-up on Depression & Anxiety.Patient's depression since last visit has been:  No change  The patient is not having other symptoms associated with depression.  Patient's anxiety since last visit has been:  Worse  The patient is having other symptoms associated with anxiety.  Any significant life events: job concerns and health concerns  Patient is feeling anxious or having panic attacks.  Patient has no concerns about alcohol or drug use.    He eats 2-3 servings of fruits  and vegetables daily.He consumes 0 sweetened beverage(s) daily.He exercises with enough effort to increase his heart rate 30 to 60 minutes per day.  He exercises with enough effort to increase his heart rate 5 days per week.   He is taking medications regularly.             Review of Systems  Constitutional, HEENT, cardiovascular, pulmonary, GI, , musculoskeletal, neuro, skin, endocrine and psych systems are negative, except as otherwise noted.      Objective           Vitals:  No vitals were obtained today due to virtual visit.    Physical Exam   General: Alert and no distress //Respiratory: No audible wheeze, cough, or shortness of breath // Psychiatric:  Appropriate affect, tone, and pace of words            Phone call duration: 30 minutes  Signed Electronically by: Humberto Altamirano MD

## 2024-06-16 ASSESSMENT — ANXIETY QUESTIONNAIRES
7. FEELING AFRAID AS IF SOMETHING AWFUL MIGHT HAPPEN: MORE THAN HALF THE DAYS
2. NOT BEING ABLE TO STOP OR CONTROL WORRYING: NEARLY EVERY DAY
7. FEELING AFRAID AS IF SOMETHING AWFUL MIGHT HAPPEN: MORE THAN HALF THE DAYS
6. BECOMING EASILY ANNOYED OR IRRITABLE: SEVERAL DAYS
GAD7 TOTAL SCORE: 18
1. FEELING NERVOUS, ANXIOUS, OR ON EDGE: NEARLY EVERY DAY
5. BEING SO RESTLESS THAT IT IS HARD TO SIT STILL: NEARLY EVERY DAY
4. TROUBLE RELAXING: NEARLY EVERY DAY
GAD7 TOTAL SCORE: 18
3. WORRYING TOO MUCH ABOUT DIFFERENT THINGS: NEARLY EVERY DAY
IF YOU CHECKED OFF ANY PROBLEMS ON THIS QUESTIONNAIRE, HOW DIFFICULT HAVE THESE PROBLEMS MADE IT FOR YOU TO DO YOUR WORK, TAKE CARE OF THINGS AT HOME, OR GET ALONG WITH OTHER PEOPLE: EXTREMELY DIFFICULT
GAD7 TOTAL SCORE: 18
8. IF YOU CHECKED OFF ANY PROBLEMS, HOW DIFFICULT HAVE THESE MADE IT FOR YOU TO DO YOUR WORK, TAKE CARE OF THINGS AT HOME, OR GET ALONG WITH OTHER PEOPLE?: EXTREMELY DIFFICULT

## 2024-06-16 ASSESSMENT — PATIENT HEALTH QUESTIONNAIRE - PHQ9
10. IF YOU CHECKED OFF ANY PROBLEMS, HOW DIFFICULT HAVE THESE PROBLEMS MADE IT FOR YOU TO DO YOUR WORK, TAKE CARE OF THINGS AT HOME, OR GET ALONG WITH OTHER PEOPLE: VERY DIFFICULT
SUM OF ALL RESPONSES TO PHQ QUESTIONS 1-9: 10
SUM OF ALL RESPONSES TO PHQ QUESTIONS 1-9: 10

## 2024-06-17 ASSESSMENT — PATIENT HEALTH QUESTIONNAIRE - PHQ9: SUM OF ALL RESPONSES TO PHQ QUESTIONS 1-9: 10

## 2024-06-17 ASSESSMENT — ANXIETY QUESTIONNAIRES: GAD7 TOTAL SCORE: 18

## 2024-06-21 ENCOUNTER — VIRTUAL VISIT (OUTPATIENT)
Dept: FAMILY MEDICINE | Facility: CLINIC | Age: 34
End: 2024-06-21
Payer: COMMERCIAL

## 2024-06-21 DIAGNOSIS — F41.1 GAD (GENERALIZED ANXIETY DISORDER): Primary | ICD-10-CM

## 2024-06-21 DIAGNOSIS — E88.89 CYP2B6 INTERMEDIATE METABOLIZER (H): ICD-10-CM

## 2024-06-21 PROCEDURE — 99213 OFFICE O/P EST LOW 20 MIN: CPT | Mod: 95 | Performed by: FAMILY MEDICINE

## 2024-06-21 RX ORDER — LORAZEPAM 1 MG/1
.5-1 TABLET ORAL EVERY 8 HOURS PRN
Qty: 60 TABLET | Refills: 2 | Status: SHIPPED | OUTPATIENT
Start: 2024-06-21 | End: 2024-07-12

## 2024-06-21 RX ORDER — SERTRALINE HYDROCHLORIDE 100 MG/1
100 TABLET, FILM COATED ORAL DAILY
Qty: 90 TABLET | Refills: 0 | Status: SHIPPED | OUTPATIENT
Start: 2024-06-21 | End: 2024-06-25

## 2024-06-21 NOTE — PROGRESS NOTES
Medication Therapy Management (MTM) Encounter    ASSESSMENT:                            Mental Health   Anxiety and Depression:   Has been on sertraline for 7 weeks with no improvement in symptoms. Was on max dose of 200mg for about 3(?) weeks with side effects of restlessness, racing heart. Feels like it isn't a good fit, would like to try something else or go back to Paxil. Writer reviewed past medication trials. Appears he hasn't tried Lexapro. Celexa was tried in 2013, no details in chart about efficacy/side effects. Would target at least 20mg Lexapro as patient is a rapid metabolizer of [id number].     PLAN:                            I will ask PCP about switching from sertraline to Lexapro. Would consider sertraline 50mg x1-2 weeks and Lexapro 10mg x1-2 weeks then stop sertraline and increase Lexapro to 20mg. Or, we could go back to Paxil. I will discuss with PCP.   *addendum: Ok per pcp to cross-taper to lexapro.   Follow-up:   Appointments in Next Year      Jul 12, 2024 1:20 PM  (Arrive by 1:15 PM)  Provider Visit with Humberto Altamirano MD  [facility] ([facility] ) [phone]            SUBJECTIVE/OBJECTIVE:                          Tapan Oh is a 33 year old male seen for a follow-up visit.       Reason for visit: medication check in.    Allergies/ADRs: Reviewed in chart  Past Medical History: Reviewed in chart  Tobacco: He reports that he has never smoked. He has never been exposed to tobacco smoke. He has never used smokeless tobacco.  Alcohol: none currently     Medication Adherence/Access: no issues reported    Mental Health   Anxiety and Depression  Sertraline 100mg once daily   Propranolol 10-20mg daily as needed   Lorazepam 0.5-1mg three times daily as needed   Trazodone 50mg nightly    Today, patient reports worsening anxiety and panic symptoms. He had been on Paxil for 14 years.  Late march 2024 started having panic attacks around 3-5am before waking up  and was needing to take ativan daily. At 5/2/24 appt with PCP -  medication direct switch from Paxil 50mg to sertraline 100mg. On 5/12/24 increased to sertraline 150mg. At last MTM visit 5/29/24, increased sertraline to 200mg daily, pharmacogenetic (PGx) results reviewed. However he noticed some increased heart rate/chest discomfort, restless energy. Therefore he and PCP decided to decrease dose back to 100mg just a couple days ago. Current symptoms: amotivation, lack of walt/pleasure in things he used to enjoy, early morning awakenings (4/5am) with anxiety, emotional lability, increased anxiety. No SI. Overall doesn't feel better on Zoloft compared to Paxil. Feels like something needs to change. Has been trying to reduce ativan use; hasn't used any in the last 2 days - had been using 0.5mg daily (presecribed 0.5-1mg d2eidha prn).   Seeing therapist for over one year. Started EMDR therapy - has had 2 sessions.            Past Psychotropic Medications        Medication Max Dose (mg) Dates / Duration Helpful? DC Reason / Adverse Effects?   bupropion 150mg 2021-8414; Jan 2024 N insomnia   buspar 10mg bid March 2024  Insomnia?   venlafaxine 37.5mg 2018- short trial  Stopped due to worsening anxiety   Paxil 50mg [id number]  Efficacy wore off; sedation   sertraline 200mg 5/3/24 - present  Restlessness on 200mg   Celexa 40mg [id number]  Doesn't remember side effects/response   duloxetine 30mg Oct 2023     Viibryd 10mg Oct 2023 - Jan 2024  Intrusive SI thoughts                             ----------------      I spent 50 minutes with this patient today. All changes were made via collaborative practice agreement with Humberto Altamirano MD. A copy of the visit note was provided to the patient's provider(s).    A summary of these recommendations was sent via [employer].    Barbara Joyner, PharmD, BCPP  Medication Therapy Management Pharmacist  [facility]      Telemedicine Visit Details  Type of service:   Video Conference via [employer]  Start Time:  8:10 AM  End Time: 9:03 AM     Medication Therapy Recommendations  Mild episode of recurrent major depressive disorder (H24)    Current Medication: sertraline (ZOLOFT) 100 MG tablet   Rationale: Undesirable effect - Adverse medication event - Safety   Recommendation: Change Medication - escitalopram 10 MG tablet   Status: Accepted per Provider

## 2024-06-21 NOTE — PROGRESS NOTES
Tapan is a 33 year old who is being evaluated via a billable video visit.        Assessment & Plan     OCTAVIO (generalized anxiety disorder)  Improved, continue sertraline 150 mg  - LORazepam (ATIVAN) 1 MG tablet; Take 0.5-1 tablets (0.5-1 mg) by mouth every 8 hours as needed for anxiety TAKE 1/2 TO 1 TABLET BY MOUTH THREE TIMES A DAY AS NEEDED FOR ANXIETY    CYP2B6 intermediate metabolizer (H)  Established with MTM        Subjective   Tapan is a 33 year old, presenting for the following health issues:  Depression      6/21/2024    11:32 AM   Additional Questions   Roomed by Completed on [email or web address]     History of Present Illness       Mental Health Follow-up:  Patient presents to follow-up on Depression & Anxiety.Patient's depression since last visit has been:  No change  The patient is not having other symptoms associated with depression.        Patient is feeling anxious or having panic attacks.  Patient has no concerns about alcohol or drug use.        PHQ-9 score:        6/16/2024    10:09 AM   PHQ   PHQ-9 Total Score 10   Q9: Thoughts of better off dead/self-harm past 2 weeks Not at all             5/1/2024    10:54 AM 5/30/2024    10:15 AM 6/16/2024    10:10 AM   OCTAVIO-7 SCORE   Total Score 16 (severe anxiety) 17 (severe anxiety) 18 (severe anxiety)   Total Score 16 17    17    17 18             Review of Systems  Constitutional, HEENT, cardiovascular, pulmonary, GI, , musculoskeletal, neuro, skin, endocrine and psych systems are negative, except as otherwise noted.      Objective           Vitals:  No vitals were obtained today due to virtual visit.    Physical Exam   GENERAL: alert and no distress  EYES: Eyes grossly normal to inspection.  No discharge or erythema, or obvious scleral/conjunctival abnormalities.  RESP: No audible wheeze, cough, or visible cyanosis.    SKIN: Visible skin clear. No significant rash, abnormal pigmentation or lesions.  NEURO: Cranial nerves grossly intact.  Mentation and speech  appropriate for age.  PSYCH: Appropriate affect, tone, and pace of words          Video-Visit Details    Type of service:  Video Visit   Originating Location (pt. Location): Home    Distant Location (provider location):  On-site  Platform used for Video Visit: Davi  Signed Electronically by: Humberto Altamirano MD

## 2024-06-24 ENCOUNTER — VIRTUAL VISIT (OUTPATIENT)
Dept: PHARMACY | Facility: CLINIC | Age: 34
End: 2024-06-24
Payer: COMMERCIAL

## 2024-06-24 DIAGNOSIS — F41.1 GAD (GENERALIZED ANXIETY DISORDER): ICD-10-CM

## 2024-06-24 DIAGNOSIS — F33.0 MILD EPISODE OF RECURRENT MAJOR DEPRESSIVE DISORDER (H): Primary | ICD-10-CM

## 2024-06-24 PROCEDURE — 99207 PR NO CHARGE LOS: CPT | Mod: 95 | Performed by: PHARMACIST

## 2024-06-24 NOTE — PATIENT INSTRUCTIONS
"Recommendations from today's MTM visit:                                                      I will ask PCP about switching from sertraline to Lexapro. Would consider sertraline 50mg x1-2 weeks and Lexapro 10mg x1-2 weeks then stop sertraline and increase Lexapro to 20mg. Or, we could go back to Paxil. I will discuss with PCP.     It was great speaking with you today.  I value your experience and would be very thankful for your time in providing feedback in our clinic survey. In the next few days, you may receive an email or text message from [email or web address] with a link to a survey related to your  clinical pharmacist.\"     To schedule another MTM appointment, please call the clinic directly or you may call the MTM scheduling line at [phone] or toll-free at [phone].     My Clinical Pharmacist's contact information:                                                      Please feel free to contact me with any questions or concerns you have.      Barbara Joyner, PharmD, BCPP  Medication Therapy Management Pharmacist  [facility]    "

## 2024-06-24 NOTE — Clinical Note
Hello,   I had a follow-up MT visit with patient today. He is feeling pretty anxious and depressed and doesn't feel like sertraline has provided any benefit. He expressed wanting to switch meds - either back to Paxil which he thinks was more effective than Sertraline or to lexapro which it looks like he hasn't tried. I would lean toward lexapro, but wanted to get your thoughts and patient was interested in what your recommendation would be as well. When I met with him previously he was already off paxil but it sounds like it made him pretty sedated.   Thank you!  Barbara

## 2024-06-25 DIAGNOSIS — F41.1 GAD (GENERALIZED ANXIETY DISORDER): ICD-10-CM

## 2024-06-25 RX ORDER — ESCITALOPRAM OXALATE 10 MG/1
10 TABLET ORAL DAILY
Qty: 30 TABLET | Refills: 0 | Status: SHIPPED | OUTPATIENT
Start: 2024-06-25 | End: 2024-07-03

## 2024-07-03 ENCOUNTER — VIRTUAL VISIT (OUTPATIENT)
Dept: PHARMACY | Facility: CLINIC | Age: 34
End: 2024-07-03
Payer: COMMERCIAL

## 2024-07-03 DIAGNOSIS — F33.0 MILD EPISODE OF RECURRENT MAJOR DEPRESSIVE DISORDER (H): Primary | ICD-10-CM

## 2024-07-03 DIAGNOSIS — F41.1 GAD (GENERALIZED ANXIETY DISORDER): ICD-10-CM

## 2024-07-03 PROCEDURE — 99207 PR NO CHARGE LOS: CPT | Mod: 95 | Performed by: PHARMACIST

## 2024-07-03 RX ORDER — ESCITALOPRAM OXALATE 20 MG/1
20 TABLET ORAL DAILY
Qty: 30 TABLET | Refills: 1 | Status: SHIPPED | OUTPATIENT
Start: 2024-07-03 | End: 2024-09-04

## 2024-07-03 NOTE — PROGRESS NOTES
Medication Therapy Management (MTM) Encounter    ASSESSMENT:                            Medication Adherence/Access: No issues identified    Mental Health   Anxiety and Depression:   Tolerating cross-taper from sertraline to Lexapro well with improvement in restless energy and anxiety over the last week.  Sleeping better, but job is still a stressor for him.  No side effects or issues with starting Lexapro. Targeting at least 20mg Lexapro as patient is a rapid metabolizer of [id number].     PLAN:                            Increase escitalopram to 20 mg daily.  Stop sertraline.     Follow-up: PCP 7/12/24; MTM 4-6 weeks    SUBJECTIVE/OBJECTIVE:                          Tapan Oh is a 33 year old male seen for a follow-up visit.       Reason for visit: cross-taper check in.    Allergies/ADRs: Reviewed in chart  Past Medical History: Reviewed in chart  Tobacco: He reports that he has never smoked. He has never been exposed to tobacco smoke. He has never used smokeless tobacco.  Alcohol: not currently    Medication Adherence/Access: no issues reported    Mental Health   Anxiety and Depression  Sertraline 50mg once daily   Lexapro 10mg daily  Propranolol 10-20mg daily as needed   Lorazepam 0.5-1mg three times daily as needed   Trazodone 50mg nightly    At the last MTM visit on 6/24/2024, plan was made to cross-taper from sertraline to escitalopram.  Patient had been experiencing worsening anxiety and panic symptoms on sertraline, particularly at doses around 150 to 200 mg. He had been on Paxil for 14 years.  Late march 2024 started having panic attacks around 3-5am before waking up and was needing to take ativan daily. At 5/2/24 appt with PCP -  medication direct switch from Paxil 50mg to sertraline 100mg. Pharmacogenetic (PGx) results reviewed at 5/29/24 MTM visit.     Today, patient reports improvement in anxious energy, racing heart since making medication change 1 week ago. He slept well over the weekend and for the  first time in a long time didn't wake up early in the morning with anxiety. He did wake up with anxiety earlier this week - related in part to anxiety over work which has been a major stressor since taking a new position at his company 5 months ago. Overall he feels he is functioning better now than he was on higher doses of sertraline. Emotions feeling more even. No side effects or issues with Lexapro. GI side effects on sertraline, but none on Lexapro.  Hasn't taken ativan for 2 weeks. Previously needing to take it almost daily for the last 2-3 months. Seeing therapist for over one year. Recently started EMDR therapy.          Past Psychotropic Medications        Medication Max Dose (mg) Dates / Duration Helpful? DC Reason / Adverse Effects?   bupropion 150mg 4675-0522; Jan 2024 N insomnia   buspar 10mg bid March 2024  Insomnia?   venlafaxine 37.5mg 2018- short trial  Stopped due to worsening anxiety   Paxil 50mg 4542-9913  Efficacy wore off; sedation   sertraline 200mg 5/3/24 - 6/25/24  Restlessness, increased anxiety on 200mg   Celexa 40mg 0943-1858  Doesn't remember side effects/response   duloxetine 30mg Oct 2023     Viibryd 10mg Oct 2023 - Jan 2024  Intrusive SI thoughts                                 ----------------      I spent 20 minutes with this patient today. All changes were made via collaborative practice agreement with Humberto Altamirano MD. A copy of the visit note was provided to the patient's provider(s).    A summary of these recommendations was sent via [employer].    Barbara Joyner, PharmD, BCPP  Medication Therapy Management Pharmacist  [facility]      Telemedicine Visit Details  Type of service:  Video Conference via [employer]  Start Time:  202p  End Time:  223p     Medication Therapy Recommendations  Mild episode of recurrent major depressive disorder (H24)    Current Medication: escitalopram (LEXAPRO) 10 MG tablet (Discontinued)   Rationale: Dose too low - Dosage  too low - Effectiveness   Recommendation: Increase Dose - escitalopram 20 MG tablet   Status: Accepted per CPA

## 2024-07-03 NOTE — PATIENT INSTRUCTIONS
"Recommendations from today's MTM visit:                                                      Increase escitalopram to 20mg daily and stop sertraline    It was great speaking with you today.  I value your experience and would be very thankful for your time in providing feedback in our clinic survey. In the next few days, you may receive an email or text message from [facility] [email or web address] with a link to a survey related to your  clinical pharmacist.\"     To schedule another MTM appointment, please call the clinic directly or you may call the MTM scheduling line at [phone] or toll-free at [phone].     My Clinical Pharmacist's contact information:                                                      Please feel free to contact me with any questions or concerns you have.      Barbara Joyner, PharmD, BCPP  Medication Therapy Management Pharmacist  [facility]    "

## 2024-07-07 ASSESSMENT — ANXIETY QUESTIONNAIRES
2. NOT BEING ABLE TO STOP OR CONTROL WORRYING: MORE THAN HALF THE DAYS
1. FEELING NERVOUS, ANXIOUS, OR ON EDGE: NEARLY EVERY DAY
7. FEELING AFRAID AS IF SOMETHING AWFUL MIGHT HAPPEN: SEVERAL DAYS
GAD7 TOTAL SCORE: 13
4. TROUBLE RELAXING: NEARLY EVERY DAY
GAD7 TOTAL SCORE: 13
6. BECOMING EASILY ANNOYED OR IRRITABLE: SEVERAL DAYS
5. BEING SO RESTLESS THAT IT IS HARD TO SIT STILL: SEVERAL DAYS
8. IF YOU CHECKED OFF ANY PROBLEMS, HOW DIFFICULT HAVE THESE MADE IT FOR YOU TO DO YOUR WORK, TAKE CARE OF THINGS AT HOME, OR GET ALONG WITH OTHER PEOPLE?: VERY DIFFICULT
3. WORRYING TOO MUCH ABOUT DIFFERENT THINGS: MORE THAN HALF THE DAYS
7. FEELING AFRAID AS IF SOMETHING AWFUL MIGHT HAPPEN: SEVERAL DAYS
IF YOU CHECKED OFF ANY PROBLEMS ON THIS QUESTIONNAIRE, HOW DIFFICULT HAVE THESE PROBLEMS MADE IT FOR YOU TO DO YOUR WORK, TAKE CARE OF THINGS AT HOME, OR GET ALONG WITH OTHER PEOPLE: VERY DIFFICULT

## 2024-07-08 ENCOUNTER — OFFICE VISIT (OUTPATIENT)
Dept: PODIATRY | Facility: CLINIC | Age: 34
End: 2024-07-08
Payer: COMMERCIAL

## 2024-07-08 VITALS
SYSTOLIC BLOOD PRESSURE: 118 MMHG | HEIGHT: 71 IN | DIASTOLIC BLOOD PRESSURE: 64 MMHG | TEMPERATURE: 96.9 F | BODY MASS INDEX: 27.02 KG/M2 | WEIGHT: 193 LBS

## 2024-07-08 DIAGNOSIS — L60.0 INGROWING NAIL: Primary | ICD-10-CM

## 2024-07-08 PROCEDURE — 11750 EXCISION NAIL&NAIL MATRIX: CPT | Mod: T5 | Performed by: PODIATRIST

## 2024-07-08 PROCEDURE — 99203 OFFICE O/P NEW LOW 30 MIN: CPT | Mod: 25 | Performed by: PODIATRIST

## 2024-07-08 ASSESSMENT — PAIN SCALES - GENERAL: PAINLEVEL: NO PAIN (0)

## 2024-07-08 NOTE — LETTER
7/8/2024      Julio Oh  [address] [place] [address]      Dear Colleague,    Thank you for referring your patient, Julio Oh, to the [facility]. Please see a copy of my visit note below.    HPI:  Julio Oh is a 33 year old male who is seen in consultation at the request of self.    Pt presents for eval of:   (Onset, Location, L/R, Character, Treatments, Injury if yes)     Onset early June 2024, ingrown medial Right great toenail. Accompanied by his Wife Denice.  Constant swelling, redness, drainage. Sharp and throbbing pain with pressure.    Soaking with epsom salt. Trim toenail    Works as Sales for [employer], requires walking and traveling.      Review of Systems:  Patient denies fever, chills, rash, wound, stiffness, limping, numbness, weakness, heart burn, blood in stool, chest pain with activity, calf pain when walking, shortness of breath with activity, chronic cough, easy bleeding/bruising, swelling of ankles, excessive thirst, fatigue, depression, anxiety.  Pt admits to the symptoms noted in history above.     PAST MEDICAL HISTORY:   Past Medical History:   Diagnosis Date     Adjustment disorder with anxiety 09/01/2010     Closed fracture of tibia 08/07/2006     Knee injury, left, initial encounter 03/22/2018     Labral tear of shoulder, right, initial encounter 02/11/2019     Tympanic membrane perforation, right 06/27/2016     PAST SURGICAL HISTORY:   Past Surgical History:   Procedure Laterality Date     PE TUBES  1998    P.E. Tubes     SHOULDER SURGERY Right      TYMPANOPLASTY Right 07/21/2016    Procedure: TYMPANOPLASTY;  Surgeon: Andres Joyce MD;  Location:  OR        MEDICATIONS:   Current Outpatient Medications:      escitalopram (LEXAPRO) 20 MG tablet, Take 1 tablet (20 mg) by mouth daily, Disp: 30 tablet, Rfl: 1     LORazepam (ATIVAN) 1 MG tablet, Take 0.5-1 tablets (0.5-1 mg) by mouth every 8 hours as needed for anxiety TAKE 1/2 TO 1  TABLET BY MOUTH THREE TIMES A DAY AS NEEDED FOR ANXIETY, Disp: 60 tablet, Rfl: 2     magnesium oxide 400 MG CAPS, Take 1 tablet by mouth daily, Disp: , Rfl:      Omega-3 Fatty Acids (FISH OIL) 1200 MG capsule, Take 1,200 mg by mouth daily, Disp: , Rfl:      propranolol (INDERAL) 10 MG tablet, 10 or 20 mg administered 30 to 60 minutes prior to anxiety-provoking situation, Disp: 60 tablet, Rfl: 1     traZODone (DESYREL) 50 MG tablet, Take 50 mg by mouth at bedtime, Disp: , Rfl:      Turmeric (QC TUMERIC COMPLEX PO), , Disp: , Rfl:      Vitamin D, Cholecalciferol, 25 MCG (1000 UT) CAPS, Take 1 tablet by mouth daily, Disp: , Rfl:      ALLERGIES:    Allergies   Allergen Reactions     No Known Drug Allergy         SOCIAL HISTORY:   Social History     Socioeconomic History     Marital status:      Spouse name: Not on file     Number of children: 0     Years of education: 5     Highest education level: Not on file   Occupational History     Occupation: student     Comment: [employer]     Occupation: Sales     Employer: [employer]   Tobacco Use     Smoking status: Never     Passive exposure: Never     Smokeless tobacco: Never     Tobacco comments:     no nicotine exposure   Vaping Use     Vaping status: Never Used   Substance and Sexual Activity     Alcohol use: No     Drug use: No     Sexual activity: Yes     Partners: Female     Birth control/protection: Pill, Condom   Other Topics Concern      Service Not Asked     Blood Transfusions Not Asked     Caffeine Concern Yes     Comment: 1 can QD     Occupational Exposure Not Asked     Hobby Hazards Not Asked     Sleep Concern Not Asked     Stress Concern Not Asked     Weight Concern Not Asked     Special Diet Not Asked     Back Care Not Asked     Exercise Not Asked     Bike Helmet Not Asked     Seat Belt Yes     Self-Exams Not Asked     Parent/sibling w/ CABG, MI or angioplasty before 65F 55M? Not Asked   Social History Narrative     Not on file      Social Determinants of Health     Financial Resource Strain: Low Risk  (1/28/2024)    Financial Resource Strain      Within the past 12 months, have you or your family members you live with been unable to get utilities (heat, electricity) when it was really needed?: No   Food Insecurity: Low Risk  (1/28/2024)    Food Insecurity      Within the past 12 months, did you worry that your food would run out before you got money to buy more?: No      Within the past 12 months, did the food you bought just not last and you didn t have money to get more?: No   Transportation Needs: Low Risk  (1/28/2024)    Transportation Needs      Within the past 12 months, has lack of transportation kept you from medical appointments, getting your medicines, non-medical meetings or appointments, work, or from getting things that you need?: No   Physical Activity: Not on file   Stress: Not on file   Social Connections: Not on file   Interpersonal Safety: Low Risk  (10/2/2023)    Interpersonal Safety      Do you feel physically and emotionally safe where you currently live?: Yes      Within the past 12 months, have you been hit, slapped, kicked or otherwise physically hurt by someone?: No      Within the past 12 months, have you been humiliated or emotionally abused in other ways by your partner or ex-partner?: No   Housing Stability: Low Risk  (1/28/2024)    Housing Stability      Do you have housing? : Yes      Are you worried about losing your housing?: No        FAMILY HISTORY:   Family History   Problem Relation Age of Onset     Diabetes Maternal Grandmother         Type II     Cancer - colorectal Maternal Grandfather      Eye Disorder Maternal Grandfather         cataracts     Prostate Cancer Maternal Grandfather      Heart Disease Paternal Grandmother         bypass     Heart Disease Paternal Grandfather         bypass        EXAM:Vitals: /64 (BP Location: Left arm, Patient Position: Sitting, Cuff Size: Adult Regular)    "Temp 96.9  F (36.1  C) (Temporal)   Ht 1.81 m (5' 11.26\")   Wt 87.5 kg (193 lb)   BMI 26.72 kg/m    BMI= Body mass index is 26.72 kg/m .    General appearance: Patient is alert and fully cooperative with history & exam.  No sign of distress is noted during the visit.     Psychiatric: Affect is pleasant & appropriate.  Patient appears motivated to improve health.     Respiratory: Breathing is regular & unlabored while sitting.     HEENT: Hearing is intact to spoken word.  Speech is clear.  No gross evidence of visual impairment that would impact ambulation.     Vascular: DP & PT pulses are intact & regular, CFT immediate, positive digital hair growth bilaterally.  No significant edema or varicosities noted and skin temperature is normal to both lower extremities.     Neurologic: Lower extremity sensation is intact to light touch.  No evidence of weakness or contracture in the lower extremities.  No evidence of neuropathy.       Dermatologic: Adequate texture, turgor and tone about the integument.  No discoloration or thickening of the toenail however the right medial hallux nail border(s) are ingrown with localized erythema, discomfort and bloody drainage.     Musculoskeletal: Patient is ambulatory without assistive device or brace.  No gross ankle deformity noted.  No foot or ankle joint effusion is noted.     ASSESSMENT:       ICD-10-CM    1. Ingrowing nail  L60.0 REMOVAL NAIL/NAIL BED, PARTIAL OR COMPLETE            Plan:   7/8/2024  We reviewed medical history and EPIC chart.  After obtaining informed consent to permanently remove the right medial hallux nail(s), I utilized 3 cc of lidocaine plain to achieve local anesthesia per digit.  The toenails were then prepped with Betadine in usual fashion.  A quarter inch Penrose drain was then utilized for hemostasis.  100% of the toenail border was avulsed utilizing a nail elevator, english anvil, 6100 blade and hemostat.  The proximal root portion of the nail was " confirmed to be removed atraumatically.  Three applications of 89% phenol were applied to the surgical site for 30 seconds each followed by a curette after each application.  Surgical site was then flushed with alcohol and dressed with bacitracin and a nonadherent compression dressing.  Tourniquet was removed after approximately 3 minutes followed by immediate hyperemia to the distal aspect of the hallux.  Written postoperative instructions were dispensed and patient instructed to follow up in 1-2 weeks with any questions, pain,drainage, delayed healing or concerns.  I answered all questions to patients satisfaction.     If patient calls in the next 1-3 weeks with continued redness, pain or drainage I would recommend beginning oral Keflex 500 mg, 4 times a day ×10 days, after confirming there is no allergy.        Guillermo Escamilla DPM      Again, thank you for allowing me to participate in the care of your patient.        Sincerely,        Guillermo Escamilla DPM

## 2024-07-08 NOTE — PROGRESS NOTES
HPI:  Julio Oh is a 33 year old male who is seen in consultation at the request of self.    Pt presents for eval of:   (Onset, Location, L/R, Character, Treatments, Injury if yes)     Onset early June 2024, ingrown medial Right great toenail. Accompanied by his Wife Denice.  Constant swelling, redness, drainage. Sharp and throbbing pain with pressure.    Soaking with epsom salt. Trim toenail    Works as Sales for [employer], requires walking and traveling.      Review of Systems:  Patient denies fever, chills, rash, wound, stiffness, limping, numbness, weakness, heart burn, blood in stool, chest pain with activity, calf pain when walking, shortness of breath with activity, chronic cough, easy bleeding/bruising, swelling of ankles, excessive thirst, fatigue, depression, anxiety.  Pt admits to the symptoms noted in history above.     PAST MEDICAL HISTORY:   Past Medical History:   Diagnosis Date    Adjustment disorder with anxiety 09/01/2010    Closed fracture of tibia 08/07/2006    Knee injury, left, initial encounter 03/22/2018    Labral tear of shoulder, right, initial encounter 02/11/2019    Tympanic membrane perforation, right 06/27/2016     PAST SURGICAL HISTORY:   Past Surgical History:   Procedure Laterality Date    PE TUBES  1998    P.E. Tubes    SHOULDER SURGERY Right     TYMPANOPLASTY Right 07/21/2016    Procedure: TYMPANOPLASTY;  Surgeon: Andres Joyce MD;  Location:  OR        MEDICATIONS:   Current Outpatient Medications:     escitalopram (LEXAPRO) 20 MG tablet, Take 1 tablet (20 mg) by mouth daily, Disp: 30 tablet, Rfl: 1    LORazepam (ATIVAN) 1 MG tablet, Take 0.5-1 tablets (0.5-1 mg) by mouth every 8 hours as needed for anxiety TAKE 1/2 TO 1 TABLET BY MOUTH THREE TIMES A DAY AS NEEDED FOR ANXIETY, Disp: 60 tablet, Rfl: 2    magnesium oxide 400 MG CAPS, Take 1 tablet by mouth daily, Disp: , Rfl:     Omega-3 Fatty Acids (FISH OIL) 1200 MG capsule, Take 1,200 mg by mouth daily, Disp: ,  Rfl:     propranolol (INDERAL) 10 MG tablet, 10 or 20 mg administered 30 to 60 minutes prior to anxiety-provoking situation, Disp: 60 tablet, Rfl: 1    traZODone (DESYREL) 50 MG tablet, Take 50 mg by mouth at bedtime, Disp: , Rfl:     Turmeric (QC TUMERIC COMPLEX PO), , Disp: , Rfl:     Vitamin D, Cholecalciferol, 25 MCG (1000 UT) CAPS, Take 1 tablet by mouth daily, Disp: , Rfl:      ALLERGIES:    Allergies   Allergen Reactions    No Known Drug Allergy         SOCIAL HISTORY:   Social History     Socioeconomic History    Marital status:      Spouse name: Not on file    Number of children: 0    Years of education: 5    Highest education level: Not on file   Occupational History    Occupation: student     Comment: [employer]    Occupation: Sales     Employer: [employer]   Tobacco Use    Smoking status: Never     Passive exposure: Never    Smokeless tobacco: Never    Tobacco comments:     no nicotine exposure   Vaping Use    Vaping status: Never Used   Substance and Sexual Activity    Alcohol use: No    Drug use: No    Sexual activity: Yes     Partners: Female     Birth control/protection: Pill, Condom   Other Topics Concern     Service Not Asked    Blood Transfusions Not Asked    Caffeine Concern Yes     Comment: 1 can QD    Occupational Exposure Not Asked    Hobby Hazards Not Asked    Sleep Concern Not Asked    Stress Concern Not Asked    Weight Concern Not Asked    Special Diet Not Asked    Back Care Not Asked    Exercise Not Asked    Bike Helmet Not Asked    Seat Belt Yes    Self-Exams Not Asked    Parent/sibling w/ CABG, MI or angioplasty before 65F 55M? Not Asked   Social History Narrative    Not on file     Social Determinants of Health     Financial Resource Strain: Low Risk  (1/28/2024)    Financial Resource Strain     Within the past 12 months, have you or your family members you live with been unable to get utilities (heat, electricity) when it was really needed?: No   Food  "Insecurity: Low Risk  (1/28/2024)    Food Insecurity     Within the past 12 months, did you worry that your food would run out before you got money to buy more?: No     Within the past 12 months, did the food you bought just not last and you didn t have money to get more?: No   Transportation Needs: Low Risk  (1/28/2024)    Transportation Needs     Within the past 12 months, has lack of transportation kept you from medical appointments, getting your medicines, non-medical meetings or appointments, work, or from getting things that you need?: No   Physical Activity: Not on file   Stress: Not on file   Social Connections: Not on file   Interpersonal Safety: Low Risk  (10/2/2023)    Interpersonal Safety     Do you feel physically and emotionally safe where you currently live?: Yes     Within the past 12 months, have you been hit, slapped, kicked or otherwise physically hurt by someone?: No     Within the past 12 months, have you been humiliated or emotionally abused in other ways by your partner or ex-partner?: No   Housing Stability: Low Risk  (1/28/2024)    Housing Stability     Do you have housing? : Yes     Are you worried about losing your housing?: No        FAMILY HISTORY:   Family History   Problem Relation Age of Onset    Diabetes Maternal Grandmother         Type II    Cancer - colorectal Maternal Grandfather     Eye Disorder Maternal Grandfather         cataracts    Prostate Cancer Maternal Grandfather     Heart Disease Paternal Grandmother         bypass    Heart Disease Paternal Grandfather         bypass        EXAM:Vitals: /64 (BP Location: Left arm, Patient Position: Sitting, Cuff Size: Adult Regular)   Temp 96.9  F (36.1  C) (Temporal)   Ht 1.81 m (5' 11.26\")   Wt 87.5 kg (193 lb)   BMI 26.72 kg/m    BMI= Body mass index is 26.72 kg/m .    General appearance: Patient is alert and fully cooperative with history & exam.  No sign of distress is noted during the visit.     Psychiatric: Affect is " pleasant & appropriate.  Patient appears motivated to improve health.     Respiratory: Breathing is regular & unlabored while sitting.     HEENT: Hearing is intact to spoken word.  Speech is clear.  No gross evidence of visual impairment that would impact ambulation.     Vascular: DP & PT pulses are intact & regular, CFT immediate, positive digital hair growth bilaterally.  No significant edema or varicosities noted and skin temperature is normal to both lower extremities.     Neurologic: Lower extremity sensation is intact to light touch.  No evidence of weakness or contracture in the lower extremities.  No evidence of neuropathy.       Dermatologic: Adequate texture, turgor and tone about the integument.  No discoloration or thickening of the toenail however the right medial hallux nail border(s) are ingrown with localized erythema, discomfort and bloody drainage.     Musculoskeletal: Patient is ambulatory without assistive device or brace.  No gross ankle deformity noted.  No foot or ankle joint effusion is noted.     ASSESSMENT:       ICD-10-CM    1. Ingrowing nail  L60.0 REMOVAL NAIL/NAIL BED, PARTIAL OR COMPLETE            Plan:   7/8/2024  We reviewed medical history and EPIC chart.  After obtaining informed consent to permanently remove the right medial hallux nail(s), I utilized 3 cc of lidocaine plain to achieve local anesthesia per digit.  The toenails were then prepped with Betadine in usual fashion.  A quarter inch Penrose drain was then utilized for hemostasis.  100% of the toenail border was avulsed utilizing a nail elevator, english anvil, 6100 blade and hemostat.  The proximal root portion of the nail was confirmed to be removed atraumatically.  Three applications of 89% phenol were applied to the surgical site for 30 seconds each followed by a curette after each application.  Surgical site was then flushed with alcohol and dressed with bacitracin and a nonadherent compression dressing.  Tourniquet  was removed after approximately 3 minutes followed by immediate hyperemia to the distal aspect of the hallux.  Written postoperative instructions were dispensed and patient instructed to follow up in 1-2 weeks with any questions, pain,drainage, delayed healing or concerns.  I answered all questions to patients satisfaction.     If patient calls in the next 1-3 weeks with continued redness, pain or drainage I would recommend beginning oral Keflex 500 mg, 4 times a day ×10 days, after confirming there is no allergy.        Guillermo Escamilla DPM

## 2024-07-12 ENCOUNTER — VIRTUAL VISIT (OUTPATIENT)
Dept: FAMILY MEDICINE | Facility: CLINIC | Age: 34
End: 2024-07-12
Payer: COMMERCIAL

## 2024-07-12 DIAGNOSIS — F41.1 GAD (GENERALIZED ANXIETY DISORDER): ICD-10-CM

## 2024-07-12 PROCEDURE — 99213 OFFICE O/P EST LOW 20 MIN: CPT | Mod: 95 | Performed by: FAMILY MEDICINE

## 2024-07-12 PROCEDURE — 96127 BRIEF EMOTIONAL/BEHAV ASSMT: CPT | Mod: 95 | Performed by: FAMILY MEDICINE

## 2024-07-12 PROCEDURE — G2211 COMPLEX E/M VISIT ADD ON: HCPCS | Mod: 95 | Performed by: FAMILY MEDICINE

## 2024-07-12 RX ORDER — LORAZEPAM 1 MG/1
.5-1 TABLET ORAL EVERY 8 HOURS PRN
Qty: 60 TABLET | Refills: 2 | Status: SHIPPED | OUTPATIENT
Start: 2024-07-12

## 2024-07-12 ASSESSMENT — PATIENT HEALTH QUESTIONNAIRE - PHQ9
SUM OF ALL RESPONSES TO PHQ QUESTIONS 1-9: 6
SUM OF ALL RESPONSES TO PHQ QUESTIONS 1-9: 6
10. IF YOU CHECKED OFF ANY PROBLEMS, HOW DIFFICULT HAVE THESE PROBLEMS MADE IT FOR YOU TO DO YOUR WORK, TAKE CARE OF THINGS AT HOME, OR GET ALONG WITH OTHER PEOPLE: SOMEWHAT DIFFICULT

## 2024-07-12 NOTE — PROGRESS NOTES
Tapan is a 33 year old who is being evaluated via a billable video visit.    How would you like to obtain your AVS? MyChart  If the video visit is dropped, the invitation should be resent by: Text to cell phone: [phone]  Will anyone else be joining your video visit? No      Assessment & Plan     OCTAVIO (generalized anxiety disorder)  The patient is now on a citalopram 20 mg.  Apart from a couple of nights with panic attacks his symptoms have improved tremendously.  He states he is not using the lorazepam as much, and does not have early morning panic attacks as he did while on sertraline and duloxetine.  There is also less side effects.  Continue present management and plan, appreciate, input and expertise of   Barbara Joyner [facility] Pharmacist  - LORazepam (ATIVAN) 1 MG tablet; Take 0.5-1 tablets (0.5-1 mg) by mouth every 8 hours as needed for anxiety TAKE 1/2 TO 1 TABLET BY MOUTH THREE TIMES A DAY AS NEEDED FOR ANXIETY                Subjective   Tapan is a 33 year old, presenting for the following health issues:  Recheck Medication and  Follow Up        7/12/2024    11:32 AM   Additional Questions   Roomed by Dong MOONEY   Accompanied by Self     History of Present Illness       Mental Health Follow-up:  Patient presents to follow-up on Depression & Anxiety.Patient's depression since last visit has been:  Medium  The patient is not having other symptoms associated with depression.  Patient's anxiety since last visit has been:  Medium  The patient is not having other symptoms associated with anxiety.  Any significant life events: relationship concerns, job concerns, financial concerns and health concerns  Patient is feeling anxious or having panic attacks.  Patient has no concerns about alcohol or drug use.    He eats 4 or more servings of fruits and vegetables daily.He consumes 0 sweetened beverage(s) daily.He exercises with enough effort to increase his heart rate 60 or more minutes per day.  He exercises  with enough effort to increase his heart rate 6 days per week.   He is taking medications regularly.             Review of Systems  Constitutional, HEENT, cardiovascular, pulmonary, GI, , musculoskeletal, neuro, skin, endocrine and psych systems are negative, except as otherwise noted.      Objective           Vitals:  No vitals were obtained today due to virtual visit.    Physical Exam   GENERAL: alert and no distress  EYES: Eyes grossly normal to inspection.  No discharge or erythema, or obvious scleral/conjunctival abnormalities.  RESP: No audible wheeze, cough, or visible cyanosis.    SKIN: Visible skin clear. No significant rash, abnormal pigmentation or lesions.  NEURO: Cranial nerves grossly intact.  Mentation and speech appropriate for age.  PSYCH: Appropriate affect, tone, and pace of words          Video-Visit Details    Type of service:  Video Visit   Originating Location (pt. Location): Home    Distant Location (provider location):  On-site  Platform used for Video Visit: Davi  Signed Electronically by: Humberto Altamirano MD

## 2024-07-15 ENCOUNTER — VIRTUAL VISIT (OUTPATIENT)
Dept: PHARMACY | Facility: CLINIC | Age: 34
End: 2024-07-15
Payer: COMMERCIAL

## 2024-07-15 DIAGNOSIS — F41.1 GAD (GENERALIZED ANXIETY DISORDER): ICD-10-CM

## 2024-07-15 DIAGNOSIS — F33.0 MILD EPISODE OF RECURRENT MAJOR DEPRESSIVE DISORDER (H): Primary | ICD-10-CM

## 2024-07-15 PROCEDURE — 99207 PR NO CHARGE LOS: CPT | Mod: 95 | Performed by: PHARMACIST

## 2024-07-15 NOTE — PROGRESS NOTES
Medication Therapy Management (MTM) Encounter    ASSESSMENT:                            Medication Adherence/Access: No issues identified    Anxiety, Depression:   Improving. Lexapro dose increased just over 1 week ago. Initial transient anxiety from dose increase improving. No current side effects    PLAN:                            Continue Lexapro 20mg daily    Follow-up: MTM 4-6 weeks    SUBJECTIVE/OBJECTIVE:                          Tapan Oh is a 33 year old male seen for a follow-up visit.       Reason for visit: medication check in.    Allergies/ADRs: Reviewed in chart  Past Medical History: Reviewed in chart  Tobacco: He reports that he has never smoked. He has never been exposed to tobacco smoke. He has never used smokeless tobacco.  Alcohol: not currently using    Medication Adherence/Access: no issues reported    Mental Health   Anxiety and Depression  Lexapro 20mg nightly (switched from morning last week)  Propranolol 10-20mg daily as needed   Lorazepam 0.5-1mg three times daily as needed   Trazodone 50mg nightly    Summary of recent medication events:   - Late march 2024 started having panic attacks around 3-5am before waking up and was needing to take ativan daily.   - At 5/2/24 appt with PCP -  medication direct switch from Paxil 50mg (had been on Paxil for 14 years) to sertraline 100mg  - 5/29/24 MTM visit Pharmacogenetic (PGx) results reviewed; sertraline increased to 200mg.   - 6/24/2024 MTM visit, plan was made to cross-taper from sertraline to escitalopram due to worsening anxiety and panic symptoms on sertraline, particularly at doses around 150 to 200 mg.   - 7/3/24 MTM visit, escitalopram increased to 20mg daily, sertraline stopped    Today, patient reports some worsening in anxiety and panic for about 4-5 days following dose increase of Lexapro to 20mg. Symptoms now improving. Sleeping better. Anxiety more manageable. Some anxiety upon wakening, but no panic attacks. Feels like things are  moving in a better direction than with sertraline. Recalls quicker improvement with Paxil when first started. No side effects, normal libido. No chest discomfort, no GI symptoms, appetite stable (low appetite and nausea with sertraline).   ADHD testing through [facility] next week.        Past Psychotropic Medications        Medication Max Dose (mg) Dates / Duration Helpful? DC Reason / Adverse Effects?   bupropion 150mg 7648-9509; Jan 2024 N insomnia   buspar 10mg bid March 2024  Insomnia?   venlafaxine 37.5mg 2018- short trial  Stopped due to worsening anxiety   Paxil 50mg 8705-0842  Efficacy wore off; sedation   sertraline 200mg 5/3/24 - 6/25/24  Restlessness, increased anxiety on 200mg   Celexa 40mg 2994-1674  Doesn't remember side effects/response   duloxetine 30mg Oct 2023     Viibryd 10mg Oct 2023 - Jan 2024  Intrusive SI thoughts                                   ----------------      I spent 22 minutes with this patient today. All changes were made via collaborative practice agreement with Humberto Altamirano MD. A copy of the visit note was provided to the patient's provider(s).    A summary of these recommendations was sent via [employer].    Barbara Joyner, PharmD, BCPP  Medication Therapy Management Pharmacist  [facility]      Telemedicine Visit Details  Type of service:  Video Conference via Exosite  Start Time:  131p  End Time:  153p     Medication Therapy Recommendations  No medication therapy recommendations to display

## 2024-07-16 NOTE — PATIENT INSTRUCTIONS
"Recommendations from today's MTM visit:                                                    Continue Lexapro 20mg daily    Follow-up: MTM 4-6 weeks    It was great speaking with you today.  I value your experience and would be very thankful for your time in providing feedback in our clinic survey. In the next few days, you may receive an email or text message from [facility] [employer] with a link to a survey related to your  clinical pharmacist.\"     To schedule another MTM appointment, please call the clinic directly or you may call the MTM scheduling line at [phone] or toll-free at [phone].     My Clinical Pharmacist's contact information:                                                      Please feel free to contact me with any questions or concerns you have.      Barbara Joyner, PharmD, BCPP  Medication Therapy Management Pharmacist  [facility]    "

## 2024-07-23 ENCOUNTER — TRANSFERRED RECORDS (OUTPATIENT)
Dept: HEALTH INFORMATION MANAGEMENT | Facility: CLINIC | Age: 34
End: 2024-07-23
Payer: COMMERCIAL

## 2024-07-24 ENCOUNTER — TRANSFERRED RECORDS (OUTPATIENT)
Dept: HEALTH INFORMATION MANAGEMENT | Facility: CLINIC | Age: 34
End: 2024-07-24
Payer: COMMERCIAL

## 2024-08-07 ENCOUNTER — VIRTUAL VISIT (OUTPATIENT)
Dept: PHARMACY | Facility: CLINIC | Age: 34
End: 2024-08-07
Payer: COMMERCIAL

## 2024-08-07 DIAGNOSIS — F33.0 MILD EPISODE OF RECURRENT MAJOR DEPRESSIVE DISORDER (H): Primary | ICD-10-CM

## 2024-08-07 DIAGNOSIS — F41.1 GAD (GENERALIZED ANXIETY DISORDER): ICD-10-CM

## 2024-08-07 PROCEDURE — 99207 PR NO CHARGE LOS: CPT | Mod: 95 | Performed by: PHARMACIST

## 2024-08-07 NOTE — PATIENT INSTRUCTIONS
"Recommendations from today's MTM visit:                                                      Continue Lexapro 20 mg daily  I will place referrals for psychiatry med management and neuropsych testing    Follow-up: 1 month    It was great speaking with you today.  I value your experience and would be very thankful for your time in providing feedback in our clinic survey. In the next few days, you may receive an email or text message from [facility] [employer] with a link to a survey related to your  clinical pharmacist.\"     To schedule another MTM appointment, please call the clinic directly or you may call the MTM scheduling line at [phone] or toll-free at [phone].     My Clinical Pharmacist's contact information:                                                      Please feel free to contact me with any questions or concerns you have.      Barbara Joyner, PharmD, BCPP  Medication Therapy Management Pharmacist  [facility]    "

## 2024-08-07 NOTE — PROGRESS NOTES
Medication Therapy Management (MTM) Encounter    ASSESSMENT:                              Mental Health   Anxiety and Depression:   Patient with ongoing anxiety symptoms.  He has been on Lexapro for a total of 6 weeks and at current dose of 20 mg daily for total of 5 weeks.  Is tolerating it better than recent past trial of sertraline.  He is a rapid [id number] metabolizer and we did discuss option of increasing to 30 mg daily.  However, he opts to continue at current dose for a little longer to see if additional benefit can be gained.  He is interested in referrals for ongoing psychiatry med management and assessment from psychiatry provider, possibly neuropsych testing.    PLAN:                            Continue Lexapro 20 mg daily  I will place referrals for psychiatry med management and neuropsych testing    Follow-up: 1 month    SUBJECTIVE/OBJECTIVE:                          Tapan Oh is a 33 year old male seen for a follow-up visit.       Reason for visit: med check.    Allergies/ADRs: none  Past Medical History: Reviewed in chart  Tobacco: He reports that he has never smoked. He has never been exposed to tobacco smoke. He has never used smokeless tobacco.  Alcohol: none      Mental Health   Anxiety and Depression  Lexapro 20mg nightly   Propranolol 10-20mg daily as needed   Lorazepam 0.5-1mg three times daily as needed   Trazodone 50mg nightly    Summary of recent medication events:   - Late march 2024 started having panic attacks around 3-5am before waking up and was needing to take ativan daily.   - At 5/2/24 appt with PCP -  medication direct switch from Paxil 50mg (had been on Paxil for 14 years) to sertraline 100mg  - 5/29/24 MTM visit Pharmacogenetic (PGx) results reviewed; sertraline increased to 200mg.   - 6/24/2024 MTM visit, plan was made to cross-taper from sertraline to escitalopram due to worsening anxiety and panic symptoms on sertraline, particularly at doses around 150 to 200 mg.   - 7/3/24 MTM  visit, escitalopram increased to 20mg daily, sertraline stopped  - 7/15/24 MTM visit, no changes  - 8/7/24 MTM visit, no changes    Today, patient reports fairly persistent anxiety over the last several weeks. Feels anxious, worry, overwhelmed, hard to get out of bed in the morning. No restless energy or heart palpitations like he experienced with sertraline. Has been trying to limit ativan to 0.5mg, but has taken it everyday in the last week. Sleeping ok, but waking up around 5am with some anxious thoughts but able to fall back asleep.   Has decreased libido, decreased appetite, slight headache. Not sure if side effect or due to increased anxiety. Had ADHD screening, testing scheduled for 8/29/24. Is interested in establishing care with a psychiatrist. Has a therapist.          Past Psychotropic Medications        Medication Max Dose (mg) Dates / Duration Helpful? DC Reason / Adverse Effects?   bupropion 150mg [id number]; Jan 2024 N insomnia   buspar 10mg bid March 2024  Insomnia?   venlafaxine 37.5mg 2018- short trial  Stopped due to worsening anxiety   Paxil 50mg [id number]  Efficacy wore off; sedation   sertraline 200mg 5/3/24 - 6/25/24  Restlessness, increased anxiety on 200mg   Celexa 40mg 7715-7793  Doesn't remember side effects/response   duloxetine 30mg Oct 2023     Viibryd 10mg Oct 2023 - Jan 2024  Intrusive SI thoughts                                         ----------------      I spent 30 minutes with this patient today. All changes were made via collaborative practice agreement with Humberto Altamirano MD. A copy of the visit note was provided to the patient's provider(s).    A summary of these recommendations was sent via [employer].    Barbara Joyner, PharmD, BCPP  Medication Therapy Management Pharmacist  [facility]      Telemedicine Visit Details  Type of service:  Video Conference via [employer]  Start Time:  100p  End Time:  130p     Medication Therapy Recommendations  No  medication therapy recommendations to display

## 2024-08-09 ENCOUNTER — MYC MEDICAL ADVICE (OUTPATIENT)
Dept: PHARMACY | Facility: CLINIC | Age: 34
End: 2024-08-09
Payer: COMMERCIAL

## 2024-08-29 ENCOUNTER — MYC MEDICAL ADVICE (OUTPATIENT)
Dept: FAMILY MEDICINE | Facility: CLINIC | Age: 34
End: 2024-08-29
Payer: COMMERCIAL

## 2024-08-29 ENCOUNTER — MYC REFILL (OUTPATIENT)
Dept: FAMILY MEDICINE | Facility: CLINIC | Age: 34
End: 2024-08-29
Payer: COMMERCIAL

## 2024-08-29 DIAGNOSIS — G47.00 INSOMNIA, UNSPECIFIED TYPE: ICD-10-CM

## 2024-08-29 DIAGNOSIS — F41.1 GAD (GENERALIZED ANXIETY DISORDER): Primary | ICD-10-CM

## 2024-08-30 RX ORDER — TRAZODONE HYDROCHLORIDE 50 MG/1
50 TABLET, FILM COATED ORAL AT BEDTIME
OUTPATIENT
Start: 2024-08-30

## 2024-08-30 RX ORDER — TRAZODONE HYDROCHLORIDE 50 MG/1
50 TABLET, FILM COATED ORAL AT BEDTIME
Qty: 90 TABLET | Refills: 1 | Status: SHIPPED | OUTPATIENT
Start: 2024-08-30

## 2024-08-30 NOTE — TELEPHONE ENCOUNTER
Patient called requesting to have his Trazodone refilled.   Patient states he only has one tablet left for tonight and will need the medication going forward.     [facility] in [place] off of [address]     Previous message was sent to Dr. Altamirano to provider for review and approval/denial.     Viv Moore RN on 8/30/2024 at 10:21 AM    
no

## 2024-09-04 ENCOUNTER — VIRTUAL VISIT (OUTPATIENT)
Dept: PHARMACY | Facility: CLINIC | Age: 34
End: 2024-09-04
Payer: COMMERCIAL

## 2024-09-04 DIAGNOSIS — F41.1 GAD (GENERALIZED ANXIETY DISORDER): ICD-10-CM

## 2024-09-04 DIAGNOSIS — F33.9 MDD (MAJOR DEPRESSIVE DISORDER), RECURRENT EPISODE (H): ICD-10-CM

## 2024-09-04 DIAGNOSIS — F41.1 GAD (GENERALIZED ANXIETY DISORDER): Primary | ICD-10-CM

## 2024-09-04 PROCEDURE — 99207 PR NO CHARGE LOS: CPT | Mod: 95 | Performed by: PHARMACIST

## 2024-09-04 RX ORDER — ESCITALOPRAM OXALATE 5 MG/1
5 TABLET ORAL DAILY
Qty: 30 TABLET | Refills: 1 | Status: SHIPPED | OUTPATIENT
Start: 2024-09-04

## 2024-09-04 RX ORDER — ESCITALOPRAM OXALATE 20 MG/1
20 TABLET ORAL DAILY
Qty: 30 TABLET | Refills: 1 | Status: SHIPPED | OUTPATIENT
Start: 2024-09-04 | End: 2024-09-25 | Stop reason: DRUGHIGH

## 2024-09-04 NOTE — PATIENT INSTRUCTIONS
"Recommendations from today's MTM visit:                                                      Increase escitalopram to 25mg daily     Follow-up: MTM 3 weeks    It was great speaking with you today.  I value your experience and would be very thankful for your time in providing feedback in our clinic survey. In the next few days, you may receive an email or text message from [facility] [employer] with a link to a survey related to your  clinical pharmacist.\"     To schedule another MTM appointment, please call the clinic directly or you may call the MTM scheduling line at [phone] or toll-free at [phone].     My Clinical Pharmacist's contact information:                                                      Please feel free to contact me with any questions or concerns you have.      Barbara Joyner, PharmD, BCPP  Medication Therapy Management Pharmacist  [facility]    "

## 2024-09-04 NOTE — Clinical Note
Some improvement in anxiety over the last couple weeks. Will increase lexapro today.   Thank you!  Barbara

## 2024-09-04 NOTE — PROGRESS NOTES
Medication Therapy Management (MTM) Encounter    ASSESSMENT:                            Medication Adherence/Access: No issues identified    Mental Health   Anxiety and Depression:   Patient with improvement in anxiety symptoms over the last week.  He has been on Lexapro for a total of 10 weeks and at current dose of 20 mg daily for total of 9 weeks.  May be experiencing some sexual side effects, but overall is tolerating it better than recent past trial of sertraline (caused jitteriness, racing heart).  He is a rapid [id number] metabolizer and we did discuss targeting higher doses up to 30 mg daily and monitoring side effects.  He is interested in smaller dose increase to 25mg daily today.      PLAN:                            Increase escitalopram to 25mg daily     Follow-up: MTM 3 weeks    SUBJECTIVE/OBJECTIVE:                          Tapan Oh is a 33 year old male seen for a follow-up visit.       Reason for visit: medication check in.    Allergies/ADRs: Reviewed in chart  Past Medical History: Reviewed in chart  Tobacco: He reports that he has never smoked. He has never been exposed to tobacco smoke. He has never used smokeless tobacco.  Alcohol: none    Medication Adherence/Access: no issues reported    Mental Health   Anxiety and Depression  Lexapro 20mg nightly   Propranolol 10-20mg daily as needed   Lorazepam 0.5-1mg three times daily as needed   Trazodone 50mg nightly    Summary of recent medication events:   - Late march 2024 started having panic attacks around 3-5am before waking up and was needing to take ativan daily.   - At 5/2/24 appt with PCP -  medication direct switch from Paxil 50mg (had been on Paxil for 14 years) to sertraline 100mg  - 5/29/24 MTM visit Pharmacogenetic (PGx) results reviewed; sertraline increased to 200mg.   - 6/24/2024 MTM visit, plan was made to cross-taper from sertraline to escitalopram due to worsening anxiety and panic symptoms on sertraline, particularly at doses around  150 to 200 mg.   - 7/3/24 MTM visit, escitalopram increased to 20mg daily, sertraline stopped  - 7/15/24 MTM visit, no changes  - 8/7/24 MTM visit, no changes  -9/4/24 MTM visit,     Today, patient reports he has begun noticing improvement from Lexapro dose increase within the last week or so. No longer waking up with anxiety/panic. Sleep has gradually improved over the last 3 weeks- not waking up at 3-5am. General anxiety improved - taking less ativan (was previously taking daily). Improvement has also coincided with vacation from work. Work has been a source of stress for him. Still having some trouble getting up and getting going for the day. Experiencing possible side effect of low libido. Adhd testing at [facility] last week - results will be relayed with patient 9/17/24. Has future appointments scheduled with [facility] psychiatry providers.      Past Psychotropic Medications        Medication Max Dose (mg) Dates / Duration Helpful? DC Reason / Adverse Effects?   bupropion 150mg 9827-8288; Jan 2024 N insomnia   buspar 10mg bid March 2024  Insomnia?   venlafaxine 37.5mg 2018- short trial  Stopped due to worsening anxiety   Paxil 50mg [id number]  Efficacy wore off; sedation   sertraline 200mg 5/3/24 - 6/25/24  Restlessness, increased anxiety on 200mg   Celexa 40mg [id number]  Doesn't remember side effects/response   duloxetine 30mg Oct 2023     Viibryd 10mg Oct 2023 - Jan 2024  Intrusive SI thoughts                                 ----------------      I spent 25 minutes with this patient today. All changes were made via collaborative practice agreement with Humberto Altamirano MD. A copy of the visit note was provided to the patient's provider(s).    A summary of these recommendations was sent via "[employer].    Barbara Joyner, PharmD, BCPP  Medication Therapy Management Pharmacist  [facility]      Telemedicine Visit Details  Type of service:  Video Conference via iFlexMe  Start Time:   1p  End Time:  125p     Medication Therapy Recommendations  OCTAVIO (generalized anxiety disorder)    Current Medication: escitalopram (LEXAPRO) 20 MG tablet   Rationale: Dose too low - Dosage too low - Effectiveness   Recommendation: Increase Dose - escitalopram 5 MG tablet   Status: Accepted per CPA

## 2024-09-05 ENCOUNTER — MYC MEDICAL ADVICE (OUTPATIENT)
Dept: PHARMACY | Facility: CLINIC | Age: 34
End: 2024-09-05
Payer: COMMERCIAL

## 2024-09-18 ENCOUNTER — MYC MEDICAL ADVICE (OUTPATIENT)
Dept: PHARMACY | Facility: CLINIC | Age: 34
End: 2024-09-18
Payer: COMMERCIAL

## 2024-09-18 ASSESSMENT — ANXIETY QUESTIONNAIRES
GAD7 TOTAL SCORE: 14
7. FEELING AFRAID AS IF SOMETHING AWFUL MIGHT HAPPEN: SEVERAL DAYS
8. IF YOU CHECKED OFF ANY PROBLEMS, HOW DIFFICULT HAVE THESE MADE IT FOR YOU TO DO YOUR WORK, TAKE CARE OF THINGS AT HOME, OR GET ALONG WITH OTHER PEOPLE?: EXTREMELY DIFFICULT
GAD7 TOTAL SCORE: 14
GAD7 TOTAL SCORE: 14

## 2024-09-19 ENCOUNTER — VIRTUAL VISIT (OUTPATIENT)
Dept: FAMILY MEDICINE | Facility: CLINIC | Age: 34
End: 2024-09-19
Payer: COMMERCIAL

## 2024-09-19 DIAGNOSIS — F41.1 GAD (GENERALIZED ANXIETY DISORDER): Chronic | ICD-10-CM

## 2024-09-19 DIAGNOSIS — F33.1 MODERATE EPISODE OF RECURRENT MAJOR DEPRESSIVE DISORDER (H): Primary | Chronic | ICD-10-CM

## 2024-09-19 DIAGNOSIS — F43.10 PTSD (POST-TRAUMATIC STRESS DISORDER): ICD-10-CM

## 2024-09-19 DIAGNOSIS — F33.1 MODERATE EPISODE OF RECURRENT MAJOR DEPRESSIVE DISORDER (H): Primary | ICD-10-CM

## 2024-09-19 DIAGNOSIS — F41.1 GAD (GENERALIZED ANXIETY DISORDER): ICD-10-CM

## 2024-09-19 PROCEDURE — 99214 OFFICE O/P EST MOD 30 MIN: CPT | Mod: 95 | Performed by: FAMILY MEDICINE

## 2024-09-19 RX ORDER — PAROXETINE 20 MG/1
20 TABLET, FILM COATED ORAL DAILY
Qty: 30 TABLET | Refills: 0 | Status: SHIPPED | OUTPATIENT
Start: 2024-09-19 | End: 2024-09-25

## 2024-09-19 ASSESSMENT — ENCOUNTER SYMPTOMS: NERVOUS/ANXIOUS: 1

## 2024-09-19 NOTE — PROGRESS NOTES
"Patient completed E-Check in. Questionnaires blown in and patient checked in without being called.     Tapan is a 33 year old who is being evaluated via a billable video visit.    How would you like to obtain your AVS? Mohinihart  If the video visit is dropped, the invitation should be resent by: Text to cell phone: [phone]  Will anyone else be joining your video visit? No      Assessment & Plan     Moderate episode of recurrent major depressive disorder (H)  PTSD (post-traumatic stress disorder)  OCTAVIO (generalized anxiety disorder)    The patient wanted to discuss recent ADHD results from [employer], form reviewed and there wasn't a diagnosis of ADHD. I did encourage the patient to reach out to his therapist to discuss PTSD, ?OCD and PDP reviewed in his form. He has an appointment scheduled with psychiatry. Plan is to transition to paroxetine per the patients request as He did admit sertraline and escitalopram weren't helpful. [facility] pharmacist is managing medications at this time, I really appreciate the input and expertise.  Verified no suicidal thoughts or ideation at this time.            BMI  Estimated body mass index is 26.72 kg/m  as calculated from the following:    Height as of 7/8/24: 1.81 m (5' 11.26\").    Weight as of 7/8/24: 87.5 kg (193 lb).     Subjective   Tapan is a 33 year old, presenting for the following health issues:  Follow Up, Anxiety, and Depression        9/19/2024     7:21 AM   Additional Questions   Roomed by Haven ALBA   Accompanied by None         9/19/2024     7:21 AM   Patient Reported Additional Medications   Patient reports taking the following new medications NA     Anxiety    History of Present Illness       Mental Health Follow-up:  Patient presents to follow-up on Depression & Anxiety.Patient's depression since last visit has been:  No change  The patient is not having other symptoms associated with depression.  Patient's anxiety since last visit has been:  Worse  The patient is not " having other symptoms associated with anxiety.  Any significant life events: health concerns  Patient is feeling anxious or having panic attacks.  Patient has no concerns about alcohol or drug use.    He eats 4 or more servings of fruits and vegetables daily.He consumes 0 sweetened beverage(s) daily.He exercises with enough effort to increase his heart rate 30 to 60 minutes per day.  He exercises with enough effort to increase his heart rate 6 days per week.   He is taking medications regularly.             Review of Systems  Constitutional, HEENT, cardiovascular, pulmonary, GI, , musculoskeletal, neuro, skin, endocrine and psych systems are negative, except as otherwise noted.      Objective           Vitals:  No vitals were obtained today due to virtual visit.    Physical Exam   GENERAL: alert and no distress  EYES: Eyes grossly normal to inspection.  No discharge or erythema, or obvious scleral/conjunctival abnormalities.  RESP: No audible wheeze, cough, or visible cyanosis.    SKIN: Visible skin clear. No significant rash, abnormal pigmentation or lesions.  NEURO: Cranial nerves grossly intact.  Mentation and speech appropriate for age.  PSYCH: Appropriate affect, tone, and pace of words        Video-Visit Details    Type of service:  Video Visit   Originating Location (pt. Location): Home    Distant Location (provider location):  Off-site  Platform used for Video Visit: Davi  Signed Electronically by: Humberto Altamirano MD

## 2024-09-19 NOTE — PROGRESS NOTES
see mychart encounter, patient requesting switch to Paxil. Will start cross-taper from Lexapro. MTM will see patient next week.

## 2024-09-19 NOTE — TELEPHONE ENCOUNTER
Cross-taper from Lexapro to Paxil:  Start Paxil 20mg daily and decrease to Lexapro 10mg daily. Continue at these doses until we meet next week.

## 2024-09-25 ENCOUNTER — VIRTUAL VISIT (OUTPATIENT)
Dept: PHARMACY | Facility: CLINIC | Age: 34
End: 2024-09-25
Payer: COMMERCIAL

## 2024-09-25 DIAGNOSIS — F41.1 GAD (GENERALIZED ANXIETY DISORDER): Chronic | ICD-10-CM

## 2024-09-25 DIAGNOSIS — F41.1 GAD (GENERALIZED ANXIETY DISORDER): Primary | ICD-10-CM

## 2024-09-25 DIAGNOSIS — F33.1 MODERATE EPISODE OF RECURRENT MAJOR DEPRESSIVE DISORDER (H): Chronic | ICD-10-CM

## 2024-09-25 PROCEDURE — 99207 PR NO CHARGE LOS: CPT | Mod: 95 | Performed by: PHARMACIST

## 2024-09-25 RX ORDER — PAROXETINE 40 MG/1
40 TABLET, FILM COATED ORAL DAILY
Qty: 30 TABLET | Refills: 1 | Status: SHIPPED | OUTPATIENT
Start: 2024-09-25

## 2024-09-25 NOTE — PROGRESS NOTES
Medication Therapy Management (MTM) Encounter    ASSESSMENT:                            Mental Health   Anxiety and Depression:   Patient 1 week in to cross-taper from Lexapro to Paxil. Tolerating it well with some improvement in sleep and anxiety. Will continue to monitor daytime sedation. Will proceed to next step in taper, patient was previously on Paxil 50mg daily for many years.     PLAN:                            Decrease Lexapro to 5mg daily for 1 week then stop  Increase Paxil to 40mg daily and continue at that dose until next follow-up    Follow-up: 4-6 weeks    SUBJECTIVE/OBJECTIVE:                          Tapan Oh is a 33 year old male seen for a follow-up visit.       Reason for visit: medication check in.    Allergies/ADRs: none  Past Medical History: Reviewed in chart  Tobacco: He reports that he has never smoked. He has never been exposed to tobacco smoke. He has never used smokeless tobacco.  Alcohol: not currently using    Medication Adherence/Access: no issues reported    Mental Health   Anxiety and Depression  Paxil 20mg daily; Lexapro 10mg nightly (cross-tapering)  Propranolol 10-20mg daily as needed   Lorazepam 0.5-1mg three times daily as needed   Trazodone 50mg nightly    Summary of recent medication events:   - Late march 2024 started having panic attacks around 3-5am before waking up and was needing to take ativan daily.   - At 5/2/24 appt with PCP -  medication direct switch from Paxil 50mg (had been on Paxil for 14 years) to sertraline 100mg  - 5/29/24 MTM visit Pharmacogenetic (PGx) results reviewed; sertraline increased to 200mg.   - 6/24/2024 MTM visit, plan was made to cross-taper from sertraline to escitalopram due to worsening anxiety and panic symptoms on sertraline, particularly at doses around 150 to 200 mg.   - 7/3/24 MTM visit, escitalopram increased to 20mg daily, sertraline stopped  - 7/15/24 MTM visit, no changes  - 8/7/24 MTM visit, no changes  -9/4/24 MTM visit,  increased Lexapro to 25mg daily  - 9/19/24 [employer] message, PCP appt, started cross-taper from Lexapro to Paxil per patient request    Today, patient reports an improvement in anxiety and sleep over the last week since beginning cross-taper from lexapro to Paxil. He reports sleeping through the night with no nighttime awakenings due to anxiety. He does still have anxious thoughts during the day (work continues to be a stressor), but thoughts are less persistent. He has noticed some increased daytime sedation with Paxil. Has future appointment scheduled with [facility] psychiatry providers - ADHD testing Oct/Nov and psychiatry provider appt Feb 2025      Past Psychotropic Medications        Medication Max Dose (mg) Dates / Duration Helpful? DC Reason / Adverse Effects?   bupropion 150mg [id number]; Jan 2024 N insomnia   buspar 10mg bid March 2024  Insomnia?   venlafaxine 37.5mg 2018- short trial  Stopped due to worsening anxiety   Paxil 50mg [id number]  Efficacy wore off; sedation   sertraline 200mg 5/3/24 - 6/25/24  Restlessness, increased anxiety on 200mg   Celexa 40mg [id number]  Doesn't remember side effects/response   duloxetine 30mg Oct 2023     Viibryd 10mg Oct 2023 - Jan 2024  Intrusive SI thoughts    Lexapro 25mg  6-9/2024 Partially Switched to paxil which patient felt was more helpful in the past                 ----------------      I spent 25 minutes with this patient today. All changes were made via collaborative practice agreement with Humberto Altamirano MD. A copy of the visit note was provided to the patient's provider(s).    A summary of these recommendations was sent via [employer].    Barbara Joyner, PharmD, BCPP  Medication Therapy Management Pharmacist  [facility]      Telemedicine Visit Details  The patient's medications can be safely assessed via a telemedicine encounter.  Type of service:  Video Conference via [employer]  Originating Location (pt. Location): Home    Distant  Location (provider location):  Off-site  Start Time:  1p  End Time:  125p     Medication Therapy Recommendations  OCTAVIO (generalized anxiety disorder)    Current Medication: PARoxetine (PAXIL) 20 MG tablet (Discontinued)   Rationale: Dose too low - Dosage too low - Effectiveness   Recommendation: Increase Dose - Paxil 40 MG Tabs   Status: Accepted per CPA

## 2024-09-25 NOTE — PATIENT INSTRUCTIONS
"Recommendations from today's MTM visit:                                                      Decrease Lexapro to 5mg daily for 1 week then stop  Increase Paxil to 40mg daily and continue at that dose until next follow-up    Follow-up: 4-6 weeks    It was great speaking with you today.  I value your experience and would be very thankful for your time in providing feedback in our clinic survey. In the next few days, you may receive an email or text message from [facility] [employer] with a link to a survey related to your  clinical pharmacist.\"     To schedule another MTM appointment, please call the clinic directly or you may call the MTM scheduling line at [phone] or toll-free at [phone].     My Clinical Pharmacist's contact information:                                                      Please feel free to contact me with any questions or concerns you have.      Barbara Joyner, PharmD, BCPP  Medication Therapy Management Pharmacist  [facility]    "

## 2024-10-24 ENCOUNTER — VIRTUAL VISIT (OUTPATIENT)
Dept: PHARMACY | Facility: CLINIC | Age: 34
End: 2024-10-24
Payer: COMMERCIAL

## 2024-10-24 DIAGNOSIS — F41.1 GAD (GENERALIZED ANXIETY DISORDER): Primary | ICD-10-CM

## 2024-10-24 DIAGNOSIS — F41.1 GAD (GENERALIZED ANXIETY DISORDER): Chronic | ICD-10-CM

## 2024-10-24 DIAGNOSIS — F33.1 MODERATE EPISODE OF RECURRENT MAJOR DEPRESSIVE DISORDER (H): Chronic | ICD-10-CM

## 2024-10-24 DIAGNOSIS — F33.9 MDD (MAJOR DEPRESSIVE DISORDER), RECURRENT EPISODE (H): ICD-10-CM

## 2024-10-24 PROCEDURE — 99207 PR NO CHARGE LOS: CPT | Mod: 95 | Performed by: PHARMACIST

## 2024-10-24 RX ORDER — PAROXETINE 10 MG/1
10 TABLET, FILM COATED ORAL DAILY
Qty: 30 TABLET | Refills: 1 | Status: SHIPPED | OUTPATIENT
Start: 2024-10-24

## 2024-10-24 RX ORDER — PAROXETINE 40 MG/1
40 TABLET, FILM COATED ORAL DAILY
Qty: 30 TABLET | Refills: 1 | Status: SHIPPED | OUTPATIENT
Start: 2024-10-24

## 2024-10-24 NOTE — PATIENT INSTRUCTIONS
"Recommendations from today's MTM visit:                                                      Increase paroxetine to 50mg daily    Follow-up: MTM 4-8 weeks    It was great speaking with you today.  I value your experience and would be very thankful for your time in providing feedback in our clinic survey. In the next few days, you may receive an email or text message from [facility] [employer] with a link to a survey related to your  clinical pharmacist.\"     To schedule another MTM appointment, please call the clinic directly or you may call the MTM scheduling line at [phone] or toll-free at [phone].     My Clinical Pharmacist's contact information:                                                      Please feel free to contact me with any questions or concerns you have.      Barbara Joyner, PharmD, BCPP  Medication Therapy Management Pharmacist  [facility]    "

## 2024-10-24 NOTE — PROGRESS NOTES
Medication Therapy Recommendations  OCTAVIO (generalized anxiety disorder)   1 Current Medication: PARoxetine (PAXIL) 40 MG tablet   Current Medication Sig: Take 1 tablet (40 mg) by mouth daily.   Rationale: Dose too low - Dosage too low - Effectiveness   Recommendation: Increase Dose - PARoxetine 10 MG tablet   Status: Accepted per CPA   Identified Date: 10/24/2024 Completed Date: 10/24/2024

## 2024-10-24 NOTE — PROGRESS NOTES
Medication Therapy Management (MTM) Encounter    ASSESSMENT:                            Medication Adherence/Access: No issues identified.    Mental Health   Anxiety and Depression:   Tolerating Paxil well with some improvement in symptoms. Will increase dose to 50mg daily which was the dose patient had been on previously for many years in the past.       PLAN:                            Increase paroxetine to 50mg daily    Follow-up: MTM 4-8 weeks    SUBJECTIVE/OBJECTIVE:                          Tapan Oh is a 33 year old male seen for a follow-up visit.       Reason for visit: med check.    Allergies/ADRs: Reviewed in chart  Past Medical History: Reviewed in chart  Tobacco: He reports that he has never smoked. He has never been exposed to tobacco smoke. He has never used smokeless tobacco.  Alcohol: none    Medication Adherence/Access: no issues reported.    Mental Health   Anxiety and Depression  Paxil 40mg daily  Lorazepam 0.5-1mg three times daily as needed   Trazodone 50mg nightly    Summary of recent medication events:   - Late march 2024 started having panic attacks around 3-5am before waking up and was needing to take ativan daily.   - At 5/2/24 appt with PCP -  medication direct switch from Paxil 50mg (had been on Paxil for 14 years) to sertraline 100mg  - 5/29/24 MTM visit Pharmacogenetic (PGx) results reviewed; sertraline increased to 200mg.   - 6/24/2024 MTM visit, plan was made to cross-taper from sertraline to escitalopram due to worsening anxiety and panic symptoms on sertraline, particularly at doses around 150 to 200 mg.   - 7/3/24 MTM visit, escitalopram increased to 20mg daily, sertraline stopped  - 7/15/24 MTM visit, no changes  - 8/7/24 MTM visit, no changes  -9/4/24 MTM visit, increased Lexapro to 25mg daily  - 9/19/24 mychart message, PCP appt, started cross-taper from Lexapro to Paxil per patient request  -9/25/24 Paxil increased to 40mg daily    Today, patient reports:   - has been doing  pretty good overall, better than he was on escitalopram  - no longer waking up with anxiety, sleeping well  - worrying less  - had some recent stressors - job, MIL health, death in the family, but is handling it ok  - has been using some ativan, never exceeds 1mg/day; hasn't needed any in the last few days  - no daytime sedation or concerning side effects  - is interested in increasing Paxil   - psychiatry provider appt Feb 2025      Past Psychotropic Medications        Medication Max Dose (mg) Dates / Duration Helpful? DC Reason / Adverse Effects?   bupropion 150mg [id number]; Jan 2024 N insomnia   buspar 10mg bid March 2024  Insomnia?   venlafaxine 37.5mg 2018- short trial  Stopped due to worsening anxiety   Paxil 50mg [id number]  Efficacy wore off; sedation   sertraline 200mg 5/3/24 - 6/25/24  Restlessness, increased anxiety on 200mg   Celexa 40mg [id number]  Doesn't remember side effects/response   duloxetine 30mg Oct 2023     Viibryd 10mg Oct 2023 - Jan 2024  Intrusive SI thoughts    Lexapro 25mg  6-9/2024 Partially Switched to paxil which patient felt was more helpful in the past                       ----------------      I spent 20 minutes with this patient today. All changes were made via collaborative practice agreement with Humberto Altamirano MD. A copy of the visit note was provided to the patient's provider(s).    A summary of these recommendations was sent via [employer].    Barbara Joyner, PharmD, BCPP  Medication Therapy Management Pharmacist  [facility]      Telemedicine Visit Details  The patient's medications can be safely assessed via a telemedicine encounter.  Type of service:  Video Conference via [employer]  Originating Location (pt. Location): Home    Distant Location (provider location):  Off-site  Start Time:  306p  End Time:  326p     Medication Therapy Recommendations  OCTAVIO (generalized anxiety disorder)   1 Current Medication: PARoxetine (PAXIL) 40 MG tablet   Current  Medication Sig: Take 1 tablet (40 mg) by mouth daily.   Rationale: Dose too low - Dosage too low - Effectiveness   Recommendation: Increase Dose - PARoxetine 10 MG tablet   Status: Accepted per CPA   Identified Date: 10/24/2024 Completed Date: 10/24/2024

## 2024-11-11 ENCOUNTER — PATIENT OUTREACH (OUTPATIENT)
Dept: CARE COORDINATION | Facility: CLINIC | Age: 34
End: 2024-11-11
Payer: COMMERCIAL

## 2024-11-26 ENCOUNTER — VIRTUAL VISIT (OUTPATIENT)
Dept: PHARMACY | Facility: CLINIC | Age: 34
End: 2024-11-26
Payer: COMMERCIAL

## 2024-11-26 DIAGNOSIS — F41.1 GAD (GENERALIZED ANXIETY DISORDER): Primary | ICD-10-CM

## 2024-11-26 DIAGNOSIS — F33.9 MDD (MAJOR DEPRESSIVE DISORDER), RECURRENT EPISODE (H): ICD-10-CM

## 2024-11-26 PROCEDURE — 99207 PR NO CHARGE LOS: CPT | Mod: 95 | Performed by: PHARMACIST

## 2024-11-26 NOTE — PROGRESS NOTES
Medication Therapy Management (MTM) Encounter    ASSESSMENT:                            Mental Health   Anxiety and Depression:   Ongoing symptoms, historically more anxiety and trouble sleeping, now increased sedation and depressive symptoms. Patient was previously on Paxil 50mg daily for several years and felt it worked better compared to other recently tried agents (lexapro, sertraline, vilazodone). Will continue at current dose for now. Has appt with psychiatry in Feb, and would appreciated their assessment as well. Patient needs to look to make sure they are in-network; insurance will be changing in Jan.     PLAN:                            No medication changes recommended today.    Follow-up: 4-8 weeks.     SUBJECTIVE/OBJECTIVE:                          Tapan Oh is a 34 year old male seen for a follow-up visit from 10/24/24.       Reason for visit: med check.    Allergies/ADRs: Reviewed in chart  Past Medical History: Reviewed in chart  Tobacco: He reports that he has never smoked. He has never been exposed to tobacco smoke. He has never used smokeless tobacco.  Alcohol: not currently using    Medication Adherence/Access: no issues reported.    Mental Health   Anxiety and Depression  Paxil 50mg daily  Lorazepam 0.5-1mg three times daily as needed   Trazodone 50mg nightly    Today, patient reports:   - struggling with stressors  - sleeping a lot; trouble getting out of bed  - birthday was yesterday; always gets increased depressive symptoms around his birthday  - worries about family, work  - doesn't like being alone which is new for him  - depression and anxiety don't seem to be improving over the last month, but did see some improvement initially with swtich from lexapro to paxil  - taking ativan almost everyday in the morning right when he wakes up; helpful for a few hours  - psychiatry provider appt Feb 2025       Summary of recent medication events:   - Late march 2024 started having panic attacks around  3-5am before waking up and was needing to take ativan daily.   - At 5/2/24 appt with PCP -  medication direct switch from Paxil 50mg (had been on Paxil for 14 years) to sertraline 100mg  - 5/29/24 MTM visit Pharmacogenetic (PGx) results reviewed; sertraline increased to 200mg.   - 6/24/2024 MTM visit, plan was made to cross-taper from sertraline to escitalopram due to worsening anxiety and panic symptoms on sertraline, particularly at doses around 150 to 200 mg.   - 7/3/24 MTM visit, escitalopram increased to 20mg daily, sertraline stopped  - 7/15/24 MTM visit, no changes  - 8/7/24 MTM visit, no changes  -9/4/24 MTM visit, increased Lexapro to 25mg daily  - 9/19/24 [employer] message, PCP appt, started cross-taper from Lexapro to Paxil per patient request  - 9/25/24 Paxil increased to 40mg daily  - 10/24/24 Paxil increased to 50mg daily     Past Psychotropic Medications        Medication Max Dose (mg) Dates / Duration Helpful? DC Reason / Adverse Effects?   bupropion 150mg [id number]; Jan 2024 N insomnia   buspar 10mg bid March 2024  Insomnia?   venlafaxine 37.5mg 2018- short trial  Stopped due to worsening anxiety   Paxil 50mg [id number]  Efficacy wore off; sedation   sertraline 200mg 5/3/24 - 6/25/24  Restlessness, increased anxiety on 200mg   Celexa 40mg [id number]  Doesn't remember side effects/response   duloxetine 30mg Oct 2023     Viibryd 10mg Oct 2023 - Jan 2024  Intrusive SI thoughts    Lexapro 25mg  6-9/2024 Partially Switched to paxil which patient felt was more helpful in the past                 ----------------      I spent 24 minutes with this patient today. A copy of the visit note was provided to the patient's provider(s).    A summary of these recommendations was sent via [employer].    Barbara Joyner, PharmD, BCPP  Medication Therapy Management Pharmacist  [facility]      Telemedicine Visit Details  The patient's medications can be safely assessed via a telemedicine  encounter.  Type of service:  Video Conference via [employer]  Originating Location (pt. Location): Home    Distant Location (provider location):  Off-site  Start Time: 11:36 AM  End Time:  12:00 PM     Medication Therapy Recommendations  No medication therapy recommendations to display

## 2024-11-26 NOTE — PATIENT INSTRUCTIONS
"Recommendations from today's MTM visit:                                                      No medication changes recommended today.    Follow-up: 4-8 weeks.     It was great speaking with you today.  I value your experience and would be very thankful for your time in providing feedback in our clinic survey. In the next few days, you may receive an email or text message from [facility] [employer] with a link to a survey related to your  clinical pharmacist.\"     To schedule another MTM appointment, please call the clinic directly or you may call the MTM scheduling line at [phone] or toll-free at [phone].     My Clinical Pharmacist's contact information:                                                      Please feel free to contact me with any questions or concerns you have.      Barbara Joyner, PharmD, BCPP  Medication Therapy Management Pharmacist  [facility]    "

## 2024-12-20 ENCOUNTER — TRANSFERRED RECORDS (OUTPATIENT)
Dept: HEALTH INFORMATION MANAGEMENT | Facility: CLINIC | Age: 34
End: 2024-12-20
Payer: COMMERCIAL

## 2024-12-26 ENCOUNTER — MYC MEDICAL ADVICE (OUTPATIENT)
Dept: FAMILY MEDICINE | Facility: CLINIC | Age: 34
End: 2024-12-26
Payer: COMMERCIAL

## 2024-12-31 ENCOUNTER — TRANSFERRED RECORDS (OUTPATIENT)
Dept: HEALTH INFORMATION MANAGEMENT | Facility: CLINIC | Age: 34
End: 2024-12-31
Payer: COMMERCIAL

## 2025-01-10 NOTE — TELEPHONE ENCOUNTER
Calcium of 6.5
PARoxetine (PAXIL) 30 MG tablet      
Refilled x 1.  Needs visit (OFV or video visit) before any further refill.   
K+ 2.9
patient has h&h of 6.8,20.6

## 2025-02-11 ASSESSMENT — PATIENT HEALTH QUESTIONNAIRE - PHQ9
SUM OF ALL RESPONSES TO PHQ QUESTIONS 1-9: 2
SUM OF ALL RESPONSES TO PHQ QUESTIONS 1-9: 2
10. IF YOU CHECKED OFF ANY PROBLEMS, HOW DIFFICULT HAVE THESE PROBLEMS MADE IT FOR YOU TO DO YOUR WORK, TAKE CARE OF THINGS AT HOME, OR GET ALONG WITH OTHER PEOPLE: SOMEWHAT DIFFICULT

## 2025-02-12 ENCOUNTER — VIRTUAL VISIT (OUTPATIENT)
Dept: PSYCHOLOGY | Facility: CLINIC | Age: 35
End: 2025-02-12
Payer: COMMERCIAL

## 2025-02-12 DIAGNOSIS — F41.1 GAD (GENERALIZED ANXIETY DISORDER): ICD-10-CM

## 2025-02-12 DIAGNOSIS — Z13.39 ADHD (ATTENTION DEFICIT HYPERACTIVITY DISORDER) EVALUATION: Primary | ICD-10-CM

## 2025-02-12 PROCEDURE — 90837 PSYTX W PT 60 MINUTES: CPT | Mod: 95

## 2025-02-12 ASSESSMENT — COLUMBIA-SUICIDE SEVERITY RATING SCALE - C-SSRS
TOTAL  NUMBER OF INTERRUPTED ATTEMPTS LIFETIME: NO
REASONS FOR IDEATION LIFETIME: MOSTLY TO END OR STOP THE PAIN (YOU COULDN'T GO ON LIVING WITH THE PAIN OR HOW YOU WERE FEELING)
6. HAVE YOU EVER DONE ANYTHING, STARTED TO DO ANYTHING, OR PREPARED TO DO ANYTHING TO END YOUR LIFE?: YES
5. HAVE YOU STARTED TO WORK OUT OR WORKED OUT THE DETAILS OF HOW TO KILL YOURSELF? DO YOU INTEND TO CARRY OUT THIS PLAN?: NO
TOTAL  NUMBER OF ABORTED OR SELF INTERRUPTED ATTEMPTS LIFETIME: NO
1. IN THE PAST MONTH, HAVE YOU WISHED YOU WERE DEAD OR WISHED YOU COULD GO TO SLEEP AND NOT WAKE UP?: NO
4. HAVE YOU HAD THESE THOUGHTS AND HAD SOME INTENTION OF ACTING ON THEM?: NO
ATTEMPT LIFETIME: NO
2. HAVE YOU ACTUALLY HAD ANY THOUGHTS OF KILLING YOURSELF?: YES
3. HAVE YOU BEEN THINKING ABOUT HOW YOU MIGHT KILL YOURSELF?: YES
6. HAVE YOU EVER DONE ANYTHING, STARTED TO DO ANYTHING, OR PREPARED TO DO ANYTHING TO END YOUR LIFE?: NO
1. HAVE YOU WISHED YOU WERE DEAD OR WISHED YOU COULD GO TO SLEEP AND NOT WAKE UP?: YES
TOTAL  NUMBER OF PREPARATORY ACTS LIFETIME: 1
4. HAVE YOU HAD THESE THOUGHTS AND HAD SOME INTENTION OF ACTING ON THEM?: YES
2. HAVE YOU ACTUALLY HAD ANY THOUGHTS OF KILLING YOURSELF?: NO

## 2025-02-12 NOTE — PROGRESS NOTES
[facility]   Mental Health & Addiction Services     Progress Note - Initial Visit    Patient  Name:  Julio Oh Date: 2025           Service Type: Individual for ADHD Assessment     Visit Start Time: 9:45 am  Visit End Time: 10:46 am    Visit #: ADHD 1    Attendees: Client attended alone    Service Modality:  Video Visit:      Provider verified identity through the following two step process.  Patient provided:  Patient photo and Patient     Telemedicine Visit: The patient's condition can be safely assessed and treated via synchronous audio and visual telemedicine encounter.      Reason for Telemedicine Visit: Patient has requested telehealth visit    Originating Site (Patient Location): Patient's home    Distant Site (Provider Location): Provider Remote Setting- Home Office    Consent:  The patient/guardian has verbally consented to: the potential risks and benefits of telemedicine (video visit) versus in person care; bill my insurance or make self-payment for services provided; and responsibility for payment of non-covered services.     Patient would like the video invitation sent by:  My Chart    Mode of Communication:  Video Conference via [facility]    Distant Location (Provider):  Off-site    As the provider I attest to compliance with applicable laws and regulations related to telemedicine.       DATA:   Interactive Complexity: No   Crisis: No   Extended Session (53+ minutes): PROLONGED SERVICE IN THE OUTPATIENT SETTING REQUIRING DIRECT (FACE-TO-FACE) PATIENT CONTACT BEYOND THE USUAL SERVICE:    - Patient's presenting concerns require more intensive intervention than could be completed within the usual service      Presenting Concerns/  Current Stressors:  Patient presents with concerns about attention and concentration. They reported the following ADHD symptoms: easily distracted, disorganized at home and car, difficulty with motivation to begin tasks, procrastinates but  can get things done by deadline, impulsive spending, interrupting, money management (struggles to follow a budget), and forgetful. Patient reported that the symptoms first began when they were approximately 11 or 12 years of age (when entered in middle school when the structure changed). Symptoms carried through      Patient reported anxiety since 6th grade, especially with school. Received extra help in school 1st through 3rd grade where he and a classmate were removed from the classroom to get individualized instruction and support. Was formally diagnosed with MDD recurrent and generalized anxiety disorder first year of college.     Patient reports current alcohol use to be approximately very occasional. Will occasionally have a glass of red wine.     Patient reports current cannabis use to be once every three weeks or so. Last use was over two weeks ago. Patient agreed to abstain until after cognitive testing for this evaluation.  Time in session used to gather information about symptoms.       Explained that patient must be located in the [facility] for virtual appointments.     Explained that this writer is obligated to report allegations of minors or vulnerable adults being abused, neglected or exploited.     Encouraged patient to contact their insurance provider to learn more about their personal cost for the assessment process.       ASSESSMENT:  Mental Status Assessment:  Appearance:   Appropriate   Eye Contact:   Good   Psychomotor Behavior: Restless   Attitude:   Cooperative  Friendly Pleasant  Orientation:   All  Speech   Rate / Production: Normal/ Responsive   Volume:  Normal   Mood:    Anxious  Sad   Affect:    Appropriate , tearful while discussing SI past  Thought Content:  Clear   Thought Form:  Goal Directed   Insight:    Fair     Assessments completed prior to this visit:  The following assessments were completed by patient for this visit:  PHQ9:       3/28/2024     8:04 PM 5/1/2024     10:53 AM 5/30/2024    10:16 AM 6/16/2024    10:09 AM 7/12/2024    11:34 AM 9/18/2024    11:41 AM 2/11/2025    11:36 AM   PHQ-9 SCORE   PHQ-9 Total Score MyChart 14 (Moderate depression) 17 (Moderately severe depression) 11 (Moderate depression) 10 (Moderate depression) 6 (Mild depression) 14 (Moderate depression) 2 (Minimal depression)   PHQ-9 Total Score 14 17 11    11    11 10 6 14 2        Patient-reported     GAD7:       1/9/2023     9:14 AM 3/28/2024     8:04 PM 5/1/2024    10:54 AM 5/30/2024    10:15 AM 6/16/2024    10:10 AM 7/7/2024     1:21 PM 9/18/2024    11:41 AM   OCTAVIO-7 SCORE   Total Score 17 (severe anxiety) 20 (severe anxiety) 16 (severe anxiety) 17 (severe anxiety) 18 (severe anxiety) 13 (moderate anxiety) 14 (moderate anxiety)   Total Score 17 20 16 17    17    17 18 13 14     CAGE-AID:       2/11/2025     3:41 PM   CAGE-AID Total Score   Total Score 1    Total Score MyChart 1 (A total score of 2 or greater is considered clinically significant)       Patient-reported     PROMIS 10-Global Health (only subscores and total score):       2/11/2025     3:41 PM   PROMIS-10 Scores Only   Global Mental Health Score 14    Global Physical Health Score 19    PROMIS TOTAL - SUBSCORES 33        Patient-reported         Safety Issues and Plan for Safety and Risk Management:   Butts Suicide Severity Rating Scale (Lifetime/Recent)      2/12/2025    10:04 AM   Butts Suicide Severity Rating (Lifetime/Recent)   1. Wish to be Dead (Lifetime) Y   Wish to be Dead Description (Lifetime) Most recent was around 10th grade.   1. Wish to be Dead (Past 1 Month) N   2. Non-Specific Active Suicidal Thoughts (Lifetime) Y   Non-Specific Active Suicidal Thought Description (Lifetime) In 10th grade- was in a serious relationship and the relationship ended.   2. Non-Specific Active Suicidal Thoughts (Past 1 Month) N   3. Active Suicidal Ideation with any Methods (Not Plan) Without Intent to Act (Lifetime) Y   Active Suicidal  Ideation with any Methods (Not Plan) Description (Lifetime) Wanted to hang  himself with a belt   3. Active Suicidal Ideation with any Methods (Not Plan) Without Intent to Act (Past 1 Month) N   4. Active Suicidal Ideation with Some Intent to Act, Without Specific Plan (Lifetime) Y   Active Suicidal Ideation with Some Intent to Act, Without Specific Plan Description (Lifetime) wrote a note   4. Active Suicidal Ideation with Some Intent to Act, Without Specific Plan (Past 1 Month) N   5. Active Suicidal Ideation with Specific Plan and Intent (Lifetime) N   Most Severe Ideation Rating (Lifetime) 4   Description of Most Severe Ideation (Lifetime) Can't deal wth the pain, broken heart   Frequency (Lifetime) 4   Duration (Lifetime) 3   Controllability (Lifetime) 4   Deterrents (Lifetime) 1   Reasons for Ideation (Lifetime) 4   Actual Attempt (Lifetime) N   Has subject engaged in non-suicidal self-injurious behavior? (Lifetime) N   Interrupted Attempts (Lifetime) N   Aborted or Self-Interrupted Attempt (Lifetime) N   Preparatory Acts or Behavior (Lifetime) Y   Total Number of Preparatory Acts (Lifetime) 1   Preparatory Acts or Behavior Description (Lifetime) Wrote a note.   Preparatory Acts or Behavior (Past 3 Months) N   Calculated C-SSRS Risk Score (Lifetime/Recent) Moderate Risk     Patient denies current fears or concerns for personal safety.  Patient denies current or recent suicidal ideation or behaviors.  Patient denies current or recent homicidal ideation or behaviors.  Patient denies current or recent self injurious behavior or ideation.  Patient denies other safety concerns.  A safety and risk management plan has been developed including: Patient consented to co-developed safety plan on 2-12-25.  Safety and risk management plan was reviewed.   Patient agreed to use safety plan should any safety concerns arise.  A copy was made available to the patient.  Patient reports there are firearms in the house. The  firearms are secured in a locked space.     Diagnostic Criteria:  Generalized Anxiety Disorder  A. Excessive anxiety and worry about a number of events or activities (such as work or school performance).   B. The person finds it difficult to control the worry.   - Restlessness or feeling keyed up or on edge.    - Being easily fatigued.    - Difficulty concentrating or mind going blank.    - Irritability.    - Muscle tension.    - Sleep disturbance (difficulty falling or staying asleep, or restless unsatisfying sleep).   D. The focus of the anxiety and worry is not confined to features of an Axis I disorder.  E. The anxiety, worry, or physical symptoms cause clinically significant distress or impairment in social, occupational, or other important areas of functioning.   F. The disturbance is not due to the direct physiological effects of a substance (e.g., a drug of abuse, a medication) or a general medical condition (e.g., hyperthyroidism) and does not occur exclusively during a Mood Disorder, a Psychotic Disorder, or a Pervasive Developmental Disorder.      DSM5 Diagnoses: (Sustained by DSM5 Criteria Listed Above)  Diagnoses: 300.02 (F41.1) Generalized Anxiety Disorder  Psychosocial & Contextual Factors: , employed, Confucianism  Intervention:   Empathy, open ended questions and reflections used throughout session. Oriented patient to ADHD process and provided some psychoeducation on assessing ADHD in adulthood. Validated experiences. Assessed for safety and created a safety plan.  Collateral Reports Completed:  Routed note to PCP      PLAN: (Homework, other):  1. Provider will continue Diagnostic Assessment.  Patient was given the following to do until next session:  Patient was informed that he will be emailed the self and collaborative rating scales to be completed. Depression and anxiety rating scales were completed.  Copies of previous report cards were requested. Patient provided consent to email the  questionnaires to [email or web address]. Patient also plans to complete the CNS Vital Signs Neurocognitive Battery and MMPI-3.    2. Provider recommended the following referrals: none.      3.  Suicide Risk and Safety Concerns were assessed for Julio Oh.    Patient meets the following risk assessment and triage:   Moderate Risk. See CSSR-Lifetime    The following has been recommended:  Complete/Review/Update Safety Plan    Safety Plan:  [facility] Safety Plan      Creation Date: 2/12/25 Last Update Date: 2/12/25      Step 1: Warning signs:    Warning Signs    No triggers anymore      Step 2: Internal coping strategies - Things I can do to take my mind off my problems without contacting another person:    Strategies    Pracitice meditation    gratitude lists    Engage martial arts      Step 3: People and social settings that provide distraction:    Name Contact Information    Denice in phone    Andi in phone    Leonides in phone       Places    Dojo    Sauna    cold shower or bath      Step 4: People whom I can ask for help during a crisis:    Name Contact Information    Denice in phone, and live together      Step 5: Professionals or agencies I can contact during a crisis:    Local Emergency Department Emergency Department Address Emergency Department Phone    [facility]        Suicide Prevention Lifeline Phone: Call or Text 908  Crisis Text Line: Text HOME to 545666     Step 6: Making the environment safer (plan for lethal means safety):   Declined to answer     Optional: What is most important to me and worth living for?:   Family.      [facility] Safety Plan. Pastora Swanson and Froy Russell. Used with permission of the authors.             Cindi Santoyo Psy.D, Postdoctoral Fellow, February 12, 2025

## 2025-02-12 NOTE — Clinical Note
Thank you for the referral.  I saw the patient today for the first appointment in the ADHD Evaluation and he was diagnosed with generalized anxiety disorder. I will assess for ADHD and route the final diagnoses and recommendations to you when the evaluation is completed.   Please let me know if you have any questions.   Cindi Santoyo Psy.D, Postdoctoral Fellow

## 2025-02-13 ENCOUNTER — MYC MEDICAL ADVICE (OUTPATIENT)
Dept: FAMILY MEDICINE | Facility: CLINIC | Age: 35
End: 2025-02-13

## 2025-02-13 NOTE — TELEPHONE ENCOUNTER
Patient Quality Outreach    Patient is due for the following:   Physical Preventive Adult Physical      Topic Date Due    Diptheria Tetanus Pertussis (DTAP/TDAP/TD) Vaccine (9 - Td or Tdap) 06/12/2023    Flu Vaccine (1) 09/01/2024       Action(s) Taken:   Schedule a Adult Preventative    Type of outreach:    Sent Together Mobile message.  Call in 1 week if not read/completed; send letter in 2 weeks if not returned/read.       Questions for provider review:    None           Lulu Hernandez CMA  Chart routed to Care Team.

## 2025-02-13 NOTE — LETTER
[facility]  [address], [address]  [place] [address]  Phone: [phone]  Fax: [phone]  February 20, 2025      Julio Oh  [address] [place] [address]      Dear Julio,    We care about your health and have reviewed your health plan including your medical conditions, medications, and lab results.  Based on this review, it is recommended that you follow up regarding the following health topic(s):  -Wellness (Physical) Visit   -Immunizations:       Health Maintenance Due   Topic Date Due    DTAP/TDAP/TD IMMUNIZATION (9 - Td or Tdap) 06/12/2023    INFLUENZA VACCINE (1) 09/01/2024      We recommend you take the following action(s):  -schedule a WELLNESS (Physical) APPOINTMENT.  We will perform the following labs: none.     Please call us at the [facility] [phone] (or use ZEB) to address the above recommendations.        If you are no longer a patient with us please give us the name of the clinic and/or provider you have transferred your care to so we may update our records and we will no longer reach out to you.     Thank you for trusting [facility] and we appreciate the opportunity to serve you.  We look forward to supporting your healthcare needs in the future.     Healthy Regards,     Your Health Care Team at [facility]

## 2025-02-20 NOTE — TELEPHONE ENCOUNTER
Patient Quality Outreach Summary      Summary:    Patient is due/failing the following:   Adult/Adolescent physical, date due: now  and   Health Maintenance Due   Topic    Diptheria Tetanus Pertussis (DTAP/TDAP/TD) Vaccine (9 - Td or Tdap)    Flu Vaccine (1)       Type of outreach:    Sent letter.  Patient read mychart but did not schedule    Questions for provider review:    None                                                                                                                    Lulu Hernandez CMA (AAMA)       Chart routed to none.

## 2025-02-27 NOTE — PROGRESS NOTES
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 for Safety and Risk Management Plan    Review of Symptoms per patient report:   Depression: Lack of interest or pleasure in doing things, Change in energy level, Difficulties concentrating, Ruminations, Irritability, Withdrawn, and Anger outbursts  Jessica:  No Symptoms  Psychosis: No Symptoms  Anxiety: Excessive worry, Nervousness, Physical complaints, such as headaches, stomachaches, muscle tension, Sleep disturbance, Ruminations, Poor concentration, and Irritability  Panic:  Panic symptoms include the following and occur much less frequently now that he is back on paxil and last approximately 5 minutes:, Palpitations, Shortness of breath, Sense of impending doom, and Sweating  Post Traumatic Stress Disorder:  Feels symptoms are well managed right now.    Eating Disorder: Binging- unhealthy relationship with food due to [employer] tournaments. Waits until late afternoon to start eating.   ADD / ADHD:  Inattentive, Difficulties listening, Poor organizational skills, Distractibility, Forgetful, Interrupts, Impulsive, Restlessness/fidgety, Hyperverbal, and Hyperactive  Conduct Disorder: Sets fires  Autism Spectrum Disorder: No symptoms  Obsessive Compulsive Disorder: Struggled with this  when he was younger- in middle school had to put on motorcross gear on a certain way otherwise bad luck. No current concerns.   Personality Disorders:  not observed    Patient reports the following compulsive behaviors and treatment history: None.      Diagnostic Criteria:   Generalized Anxiety Disorder  A. Excessive anxiety and worry about a number of events or activities (such as work or school performance).   B. The person finds it difficult to control the worry.   - Restlessness or feeling keyed up or on edge.    - Being easily fatigued.    - Difficulty concentrating or mind going blank.    - Irritability.    - Muscle tension.    - Sleep disturbance (difficulty falling or staying asleep, or restless unsatisfying sleep).   D. The focus of the anxiety and worry is not confined to features of an Axis I disorder.  E. The anxiety, worry, or physical symptoms cause clinically significant distress or impairment in social, occupational, or other important areas of functioning.   F. The disturbance is not due to the direct physiological effects of a substance (e.g., a drug of abuse, a medication) or a general medical condition (e.g., hyperthyroidism) and does not occur exclusively during a Mood Disorder, a Psychotic Disorder, or a Pervasive Developmental Disorder.    Functional Status:  Patient reports the following functional impairments:  academic performance, educational activities, management of the household and or completion of tasks, organization, and work / vocational responsibilities.     Nonprogrammatic care:  Patient is requesting basic services to address current mental health concerns.    Clinical Summary:  1. Psychosocial Factors:  Trauma history, Occupational Issues.  Cultural and Contextual Factors: , employed, Amish  2. Principal DSM5 Diagnoses  (Sustained by DSM5 Criteria Listed Above):   300.02 (F41.1) Generalized Anxiety Disorder.  3. Other Diagnoses that is relevant to services:   none  4.  Continue to assess for ADHD given patient report of symptoms  5. Prognosis: Expect Improvement.  6. Likely consequences of symptoms if not treated: Ongoing difficult managing mental health symptoms which will negatively impact quality of life.  7. Patient strengths include:  educated, employed, goal-focused, has a previous history of therapy, insightful, intelligent, motivated, and wants to learn .     Recommendations:     1. Plan for Safety and Risk Management:   Safety and Risk: A safety and risk management plan has been developed including: Patient consented to co-developed safety plan.  Safety and risk management plan was completed - see below.  Patient agreed to use safety plan should any safety concerns arise.  A copy was given to the patient.        Report to child / adult protection services was NA.     2. Patient's identified jimmy / Confucianist / spiritual influences will be incorporated into care by looking for resources that incorporate his jimmy.     3. Initial Treatment will focus on:    ADHD Testing:  Patient was given self and collaborative rating scales to be completed prior to the next appointment.  Depression and anxiety rating scales were completed.  Copies of psychological testing were requested.      4. Resources/Service Plan:    services are not indicated.   Modifications to assist communication are not indicated.   Additional disability accommodations are not indicated.      5. Collaboration:   Collaboration / coordination of treatment will be initiated with the following support professionals: primary care physician.      6.  Referrals:   The following referral(s) will be initiated: none.       A Release of Information has been obtained for the following: none.     Clinical Substantiation/medical necessity for the above recommendations: Psychological testing is required for diagnostic clarification. Clinical interview was not sufficient to determine if patient meets criteria for  ADHD.     7. LEORA:    LEORA:  Discussed the general effects of drugs and alcohol on health and well-being.  Recommendations:  Patient encouraged to reach out to care team should he ever become concerned about his use of substances.    8. Records:   These were reviewed at time of assessment.   Information in this assessment was obtained from the medical record and  provided by patient who is a good historian.    Patient will have open access to their mental health medical record.    9.   Interactive Complexity: No    10. Safety Plan:   [facility] Safety Plan      Creation Date: 2/12/25 Last Update Date: 2/12/25      Step 1: Warning signs:    Warning Signs    No triggers anymore      Step 2: Internal coping strategies - Things I can do to take my mind off my problems without contacting another person:    Strategies    Pracitice meditation    gratitude lists    Engage martial arts      Step 3: People and social settings that provide distraction:    Name Contact Information    Denice in phone    Andi in phone    Leonides in phone       Places    Dojo    Sauna    cold shower or bath      Step 4: People whom I can ask for help during a crisis:    Name Contact Information    Denice in phone, and live together      Step 5: Professionals or agencies I can contact during a crisis:    Clinician/Agency Name Phone Emergency Contact    [facility] crisis line [phone]      [facility] Emergency Department Emergency Department Address Emergency Department Phone    [facility] [address], [address]       Suicide Prevention Lifeline Phone: Call or Text 090  Crisis Text Line: Text HOME to 411974     Step 6: Making the environment safer (plan for lethal means safety):   Declined to answer     Optional: What is most important to me and worth living for?:   Family.      [facility] Safety Plan. Pastora Swanson and Froy Russell. Used with permission of the authors.          Cindi  Cleveland Santoyo, Postdoctoral Fellow, February 28, 2025

## 2025-02-28 ENCOUNTER — VIRTUAL VISIT (OUTPATIENT)
Dept: PSYCHOLOGY | Facility: CLINIC | Age: 35
End: 2025-02-28
Payer: COMMERCIAL

## 2025-02-28 DIAGNOSIS — Z13.39 ADHD (ATTENTION DEFICIT HYPERACTIVITY DISORDER) EVALUATION: ICD-10-CM

## 2025-02-28 DIAGNOSIS — F41.1 GAD (GENERALIZED ANXIETY DISORDER): Primary | ICD-10-CM

## 2025-02-28 PROCEDURE — 90791 PSYCH DIAGNOSTIC EVALUATION: CPT | Mod: 95

## 2025-03-05 ENCOUNTER — MYC REFILL (OUTPATIENT)
Dept: FAMILY MEDICINE | Facility: CLINIC | Age: 35
End: 2025-03-05
Payer: COMMERCIAL

## 2025-03-05 DIAGNOSIS — F41.1 GAD (GENERALIZED ANXIETY DISORDER): ICD-10-CM

## 2025-03-05 DIAGNOSIS — G47.00 INSOMNIA, UNSPECIFIED TYPE: ICD-10-CM

## 2025-03-05 RX ORDER — TRAZODONE HYDROCHLORIDE 50 MG/1
50 TABLET ORAL AT BEDTIME
Qty: 90 TABLET | Refills: 1 | OUTPATIENT
Start: 2025-03-05

## 2025-03-05 RX ORDER — TRAZODONE HYDROCHLORIDE 50 MG/1
50 TABLET ORAL AT BEDTIME
Qty: 90 TABLET | Refills: 1 | Status: SHIPPED | OUTPATIENT
Start: 2025-03-05

## 2025-04-25 ENCOUNTER — NURSE TRIAGE (OUTPATIENT)
Dept: FAMILY MEDICINE | Facility: CLINIC | Age: 35
End: 2025-04-25
Payer: COMMERCIAL

## 2025-04-28 NOTE — TELEPHONE ENCOUNTER
RN Triage    Patient Contact    Attempt # 1    Was call answered?  No.  Left message on voicemail with information to call me back.    Upon call back please triage patient from my chart message 4/25/2025    Sent my chart message to.       Doron Magallon RN  [facility] Triage [facility]el  April 28, 2025

## 2025-04-28 NOTE — TELEPHONE ENCOUNTER
Nurse Triage SBAR    Is this a 2nd Level Triage? NO    Situation: Rectal bleeding    Background: Patient reports 1 episode of rectal bleeding last Monday. Dad has history of colon cancer. Patient would like to be seen.    Assessment: Patient reports he had 1 episode of a bloody stool last Monday, reports it only happened the 1 time. Denies abdominal pain, denies rectal pain, denies bleeding, denies dizziness or lightheaded. Reports his Dad has history of colon cancer and he would like to be seen. Patient reports he is unsure if he has hemorrhoids, reports he did strain a lot to have the bowel movement and there was blood on the toilet paper when he wiped. Denies urinary symptoms.     Protocol Recommended Disposition:   See in Office Within 2 Weeks    Recommendation: Patient scheduled in clinic with PCP next week. RN reviewed red flag symptoms with patient and when to see emergency care. They agreed and understood.       Does the patient meet one of the following criteria for ADS visit consideration? 16+ years old, with an [facility] PCP     Kandis Porter RN on 4/28/2025 at 5:48 PM    Reason for Disposition   Family history of cancer of intestines    Additional Information   Negative: Passed out (e.g., fainted, lost consciousness, blacked out and was not responding)   Negative: Shock suspected (e.g., cold/pale/clammy skin, too weak to stand, low BP, rapid pulse)   Negative: Vomiting red blood or black (coffee ground) material   Negative: Sounds like a life-threatening emergency to the triager   Negative: Diarrhea is main symptom   Negative: Rectal symptoms   Negative: SEVERE rectal bleeding (large blood clots; constant or on and off bleeding)   Negative: SEVERE dizziness (e.g., unable to stand, requires support to walk, feels like passing out now)   Negative: MODERATE rectal bleeding (small blood clots, passing blood without stool, or toilet water turns red) more than once a day   Negative: Bloody, black, or tarry  bowel movements  (Exception: Chronic-unchanged black-grey bowel movements and is taking iron pills or Pepto-Bismol.)   Negative: High-risk adult (e.g., prior surgery on aorta, abdominal aortic aneurysm)   Negative: Rectal foreign body (inserted or swallowed)   Negative: SEVERE abdominal pain (e.g., excruciating)   Negative: Constant abdominal pain lasting > 2 hours   Negative: Pale skin (pallor) of new-onset or getting worse   Negative: Patient sounds very sick or weak to the triager   Negative: MODERATE rectal bleeding (small blood clots, passing blood without stool, or toilet water turns red)   Negative: Taking Coumadin (warfarin) or other strong blood thinner, or known bleeding disorder (e.g., thrombocytopenia)   Negative: Colonoscopy in past 72 hours   Negative: Known cirrhosis of the liver (or history of liver failure or ascites)   Negative: Patient wants to be seen   Negative: MILD rectal bleeding (more than just a few drops or streaks)   Negative: Cancer of rectum or intestines (colon)   Negative: Radiation therapy to lower abdomen or pelvis   Negative: Rectal bleeding is minimal (e.g., blood just on toilet paper, a few drops in toilet bowl), and bleeding recurs 3 or more times using Care Advice   Negative: Rectal bleeding is a chronic symptom (recurrent or ongoing AND present > 4 weeks)    Protocols used: Rectal Bleeding-A-OH

## 2025-05-05 ENCOUNTER — PATIENT OUTREACH (OUTPATIENT)
Dept: CARE COORDINATION | Facility: CLINIC | Age: 35
End: 2025-05-05
Payer: COMMERCIAL

## 2025-05-06 ENCOUNTER — OFFICE VISIT (OUTPATIENT)
Dept: FAMILY MEDICINE | Facility: CLINIC | Age: 35
End: 2025-05-06
Payer: COMMERCIAL

## 2025-05-06 VITALS
DIASTOLIC BLOOD PRESSURE: 74 MMHG | TEMPERATURE: 99 F | WEIGHT: 194 LBS | HEART RATE: 88 BPM | BODY MASS INDEX: 27.16 KG/M2 | HEIGHT: 71 IN | SYSTOLIC BLOOD PRESSURE: 116 MMHG | OXYGEN SATURATION: 97 % | RESPIRATION RATE: 18 BRPM

## 2025-05-06 DIAGNOSIS — Z80.0 FAMILY HISTORY OF COLON CANCER: ICD-10-CM

## 2025-05-06 DIAGNOSIS — K62.5 BRIGHT RED RECTAL BLEEDING: Primary | ICD-10-CM

## 2025-05-06 DIAGNOSIS — K64.9 HEMORRHOIDS, UNSPECIFIED HEMORRHOID TYPE: ICD-10-CM

## 2025-05-06 ASSESSMENT — PATIENT HEALTH QUESTIONNAIRE - PHQ9
SUM OF ALL RESPONSES TO PHQ QUESTIONS 1-9: 11
10. IF YOU CHECKED OFF ANY PROBLEMS, HOW DIFFICULT HAVE THESE PROBLEMS MADE IT FOR YOU TO DO YOUR WORK, TAKE CARE OF THINGS AT HOME, OR GET ALONG WITH OTHER PEOPLE: VERY DIFFICULT
SUM OF ALL RESPONSES TO PHQ QUESTIONS 1-9: 11

## 2025-05-06 ASSESSMENT — PAIN SCALES - GENERAL: PAINLEVEL_OUTOF10: NO PAIN (0)

## 2025-05-06 NOTE — PROGRESS NOTES
"  {PROVIDER CHARTING PREFERENCE:119292}    Subjective   Tapan is a 34 year old, presenting for the following health issues:      Rectal Problem        5/6/2025    10:48 AM   Additional Questions   Roomed by Lulu Hernandez CMA   Accompanied by self         5/6/2025    10:48 AM   Patient Reported Additional Medications   Patient reports taking the following new medications none     HPI      {MA/LPN/RN Pre-Provider Visit Orders- hCG/UA/Strep (Optional):513716}  Blood in stool  Onset/Duration: happened one evening about 2 weeks ago-the night before ate something that he stated was not fully digested that had sharp edges and next day had BM that was bloody. Wife is RN and wondering if he has internal hemorrhoids so wondering about maybe getting scope done. Blood in stool was very bright red and was not mixed in to stool   Description:   Clarisa-anal lump: stated maybe but not sure   Pain: YES- \"slight discomfort\"   Itching: No  Accompanying Signs & Symptoms:  Blood in stool: YES  Changes in stool pattern: YES- last few weeks have been inconsistent for him he stated due to allergies and what he has been eating to help with that but has been getting back to normal    History:   Any previous GI studies done:none  Family History of colon cancer: YES- dad had colon cancer, stated maybe on his dad side of the family there may be more but not sure   Precipitating factors:   None  Alleviating factors:  None  Therapies tried and outcome: none  {additonal problems for provider to add (Optional):486756}    {ROS Picklists (Optional):959206}      Objective    /74   Pulse 88   Temp 99  F (37.2  C) (Temporal)   Resp 18   Ht 1.81 m (5' 11.26\")   Wt 88 kg (194 lb)   SpO2 97%   BMI 26.86 kg/m    Body mass index is 26.86 kg/m .  Physical Exam   {Exam List (Optional):493111}    {Diagnostic Test Results (Optional):281932}        Signed Electronically by: Melissa Brunson PA-C  {Email feedback regarding this note to " [email or web address]   :709057}  Answers submitted by the patient for this visit:  Patient Health Questionnaire (Submitted on 5/6/2025)  If you checked off any problems, how difficult have these problems made it for you to do your work, take care of things at home, or get along with other people?: Very difficult  PHQ9 TOTAL SCORE: 11  General Questionnaire (Submitted on 5/6/2025)  Chief Complaint: Chronic problems general questions HPI Form  How many days per week do you miss taking your medication?: 0  General Concern (Submitted on 5/6/2025)  Chief Complaint: Chronic problems general questions HPI Form  What is the reason for your visit today?: bloody stool  When did your symptoms begin?: 1-2 weeks ago  What are your symptoms?: anus  How would you describe these symptoms?: Moderate  Are your symptoms:: Improving  Have you had these symptoms before?: No  Is there anything that makes you feel worse?: eating sharp edge foods  Is there anything that makes you feel better?: eating softer  foods like yogurt  Questionnaire about: Chronic problems general questions HPI Form (Submitted on 5/6/2025)  Chief Complaint: Chronic problems general questions HPI Form

## 2025-05-07 ENCOUNTER — PATIENT OUTREACH (OUTPATIENT)
Dept: CARE COORDINATION | Facility: CLINIC | Age: 35
End: 2025-05-07
Payer: COMMERCIAL

## 2025-05-14 ENCOUNTER — TRANSFERRED RECORDS (OUTPATIENT)
Dept: HEALTH INFORMATION MANAGEMENT | Facility: CLINIC | Age: 35
End: 2025-05-14
Payer: COMMERCIAL

## 2025-06-12 ENCOUNTER — VIRTUAL VISIT (OUTPATIENT)
Dept: PHARMACY | Facility: CLINIC | Age: 35
End: 2025-06-12
Payer: COMMERCIAL

## 2025-06-12 DIAGNOSIS — G47.00 INSOMNIA, UNSPECIFIED TYPE: ICD-10-CM

## 2025-06-12 DIAGNOSIS — F33.9 MDD (MAJOR DEPRESSIVE DISORDER), RECURRENT EPISODE: ICD-10-CM

## 2025-06-12 DIAGNOSIS — F41.1 GAD (GENERALIZED ANXIETY DISORDER): ICD-10-CM

## 2025-06-12 DIAGNOSIS — F41.1 GAD (GENERALIZED ANXIETY DISORDER): Primary | ICD-10-CM

## 2025-06-12 RX ORDER — TRAZODONE HYDROCHLORIDE 50 MG/1
50 TABLET ORAL AT BEDTIME
Qty: 90 TABLET | Refills: 1 | Status: SHIPPED | OUTPATIENT
Start: 2025-06-12

## 2025-06-12 NOTE — PROGRESS NOTES
Disease State Management Encounter:                          Tapan Oh is a 34 year old male seen for a follow-up visit from 5/2/25.      Reason for visit: medication check in.    Mental Health   Anxiety and Depression  Paxil 50mg daily  Lorazepam 0.5-1mg three times daily as needed   Trazodone 50mg nightly    Today, patient reports:   - improvement in daytime sedation with decreased dose of paxil; sexual side effects unchanged  - ongoing anxiety and depression - work related stressors (applying for new positions)  - mood improved slightly overall with summer and less sedation  - feels paroxetine 50mg daily is a better fit than 60mg  - taking ativan a little less -  taking about 75% of the week, typically once in the morning with morning anxiety regarding expectations for the day  - upcoming sleep study      Summary of recent medication events:   - Late march 2024 started having panic attacks around 3-5am before waking up and was needing to take ativan daily.   - At 5/2/24 appt with PCP -  medication direct switch from Paxil 50mg (had been on Paxil for 14 years) to sertraline 100mg  - 5/29/24 MTM visit Pharmacogenetic (PGx) results reviewed; sertraline increased to 200mg.   - 6/24/2024 MTM visit, plan was made to cross-taper from sertraline to escitalopram due to worsening anxiety and panic symptoms on sertraline, particularly at doses around 150 to 200 mg.   - 7/3/24 MTM visit, escitalopram increased to 20mg daily, sertraline stopped  - 7/15/24 MTM visit, no changes  - 8/7/24 MTM visit, no changes  -9/4/24 MTM visit, increased Lexapro to 25mg daily  - 9/19/24 vero marie, PCP appt, started cross-taper from Lexapro to Paxil per patient request  - 9/25/24 Paxil increased to 40mg daily  - 10/24/24 Paxil increased to 50mg daily  - 11/26/24 no changes  -12/20/24 Paxil increased to 60mg  - 1/31/25 no changes  - 3/14/25 no changes  - 5/2/25 decreased paxil to 50mg (sedation, sexual side effects)  - 6/12/25 no  changes     Past Psychotropic Medications        Medication Max Dose (mg) Dates / Duration Helpful? DC Reason / Adverse Effects?   bupropion 150mg [id number]; Jan 2024 N insomnia   buspar 10mg bid March 2024  Insomnia?   venlafaxine 37.5mg 2018- short trial  Stopped due to worsening anxiety   Paxil 60mg [id number];   current  Felt Efficacy wore off with initial trial; sedation   sertraline 200mg 5/3/24 - 6/25/24  Restlessness, increased anxiety on 200mg   Celexa 40mg [id number]  Doesn't remember side effects/response   duloxetine 30mg Oct 2023     Viibryd 10mg Oct 2023 - Jan 2024  Intrusive SI thoughts    Lexapro 25mg  6-9/2024 Partially Switched to paxil which patient felt was more helpful in the past                        Assessment/Plan:    Mental Health:   Some improvement in sedation with paxil dose decrease to 50mg. Ongoing depression and anxiety largely related to work stressors. Continue current medications. No changes at this time per patient preference.     Follow-up: MTM 6-8 weeks    I spent 15 minutes with this patient today. All changes were made via collaborative practice agreement with Humberto Altamirano MD.     A summary of these recommendations was sent via [employer].    Barbara Joyner, PharmD, BCPP  Medication Therapy Management Pharmacist  [facility]           Medication Therapy Recommendations  No medication therapy recommendations to display

## 2025-06-16 SDOH — HEALTH STABILITY: PHYSICAL HEALTH: ON AVERAGE, HOW MANY MINUTES DO YOU ENGAGE IN EXERCISE AT THIS LEVEL?: 60 MIN

## 2025-06-16 SDOH — HEALTH STABILITY: PHYSICAL HEALTH: ON AVERAGE, HOW MANY DAYS PER WEEK DO YOU ENGAGE IN MODERATE TO STRENUOUS EXERCISE (LIKE A BRISK WALK)?: 6 DAYS

## 2025-06-16 ASSESSMENT — SOCIAL DETERMINANTS OF HEALTH (SDOH): HOW OFTEN DO YOU GET TOGETHER WITH FRIENDS OR RELATIVES?: TWICE A WEEK

## 2025-06-18 ENCOUNTER — RESULTS FOLLOW-UP (OUTPATIENT)
Dept: FAMILY MEDICINE | Facility: CLINIC | Age: 35
End: 2025-06-18

## 2025-06-18 ENCOUNTER — OFFICE VISIT (OUTPATIENT)
Dept: FAMILY MEDICINE | Facility: CLINIC | Age: 35
End: 2025-06-18
Payer: COMMERCIAL

## 2025-06-18 VITALS
RESPIRATION RATE: 16 BRPM | OXYGEN SATURATION: 98 % | BODY MASS INDEX: 27.02 KG/M2 | SYSTOLIC BLOOD PRESSURE: 126 MMHG | TEMPERATURE: 98.9 F | DIASTOLIC BLOOD PRESSURE: 72 MMHG | WEIGHT: 193 LBS | HEART RATE: 63 BPM | HEIGHT: 71 IN

## 2025-06-18 DIAGNOSIS — F41.1 GAD (GENERALIZED ANXIETY DISORDER): ICD-10-CM

## 2025-06-18 DIAGNOSIS — Z13.220 LIPID SCREENING: ICD-10-CM

## 2025-06-18 DIAGNOSIS — Z13.21 ENCOUNTER FOR VITAMIN DEFICIENCY SCREENING: ICD-10-CM

## 2025-06-18 DIAGNOSIS — Z23 ENCOUNTER FOR ADMINISTRATION OF VACCINE: ICD-10-CM

## 2025-06-18 DIAGNOSIS — H90.3 ASYMMETRICAL SENSORINEURAL HEARING LOSS: ICD-10-CM

## 2025-06-18 DIAGNOSIS — Z13.0 SCREENING FOR ENDOCRINE, METABOLIC AND IMMUNITY DISORDER: ICD-10-CM

## 2025-06-18 DIAGNOSIS — Z13.29 SCREENING FOR ENDOCRINE, METABOLIC AND IMMUNITY DISORDER: ICD-10-CM

## 2025-06-18 DIAGNOSIS — Z00.00 ROUTINE GENERAL MEDICAL EXAMINATION AT A HEALTH CARE FACILITY: Primary | ICD-10-CM

## 2025-06-18 DIAGNOSIS — Z13.228 SCREENING FOR ENDOCRINE, METABOLIC AND IMMUNITY DISORDER: ICD-10-CM

## 2025-06-18 LAB
ALBUMIN SERPL BCG-MCNC: 4.8 G/DL (ref 3.5–5.2)
ALP SERPL-CCNC: 71 U/L (ref 40–150)
ALT SERPL W P-5'-P-CCNC: 22 U/L (ref 0–70)
ANION GAP SERPL CALCULATED.3IONS-SCNC: 9 MMOL/L (ref 7–15)
AST SERPL W P-5'-P-CCNC: 36 U/L (ref 0–45)
BASOPHILS # BLD AUTO: 0 10E3/UL (ref 0–0.2)
BASOPHILS NFR BLD AUTO: 1 %
BILIRUB SERPL-MCNC: 0.5 MG/DL
BUN SERPL-MCNC: 16.6 MG/DL (ref 6–20)
CALCIUM SERPL-MCNC: 9.7 MG/DL (ref 8.8–10.4)
CHLORIDE SERPL-SCNC: 102 MMOL/L (ref 98–107)
CHOLEST SERPL-MCNC: 182 MG/DL
CREAT SERPL-MCNC: 1.28 MG/DL (ref 0.67–1.17)
EGFRCR SERPLBLD CKD-EPI 2021: 75 ML/MIN/1.73M2
EOSINOPHIL # BLD AUTO: 0 10E3/UL (ref 0–0.7)
EOSINOPHIL NFR BLD AUTO: 1 %
ERYTHROCYTE [DISTWIDTH] IN BLOOD BY AUTOMATED COUNT: 12.8 % (ref 10–15)
EST. AVERAGE GLUCOSE BLD GHB EST-MCNC: 108 MG/DL
FASTING STATUS PATIENT QL REPORTED: YES
FASTING STATUS PATIENT QL REPORTED: YES
GLUCOSE SERPL-MCNC: 98 MG/DL (ref 70–99)
HBA1C MFR BLD: 5.4 % (ref 0–5.6)
HCO3 SERPL-SCNC: 28 MMOL/L (ref 22–29)
HCT VFR BLD AUTO: 43.4 % (ref 40–53)
HDLC SERPL-MCNC: 61 MG/DL
HGB BLD-MCNC: 14.9 G/DL (ref 13.3–17.7)
IMM GRANULOCYTES # BLD: 0 10E3/UL
IMM GRANULOCYTES NFR BLD: 0 %
LDLC SERPL CALC-MCNC: 111 MG/DL
LYMPHOCYTES # BLD AUTO: 1.5 10E3/UL (ref 0.8–5.3)
LYMPHOCYTES NFR BLD AUTO: 27 %
MCH RBC QN AUTO: 29.7 PG (ref 26.5–33)
MCHC RBC AUTO-ENTMCNC: 34.3 G/DL (ref 31.5–36.5)
MCV RBC AUTO: 87 FL (ref 78–100)
MONOCYTES # BLD AUTO: 0.5 10E3/UL (ref 0–1.3)
MONOCYTES NFR BLD AUTO: 8 %
NEUTROPHILS # BLD AUTO: 3.5 10E3/UL (ref 1.6–8.3)
NEUTROPHILS NFR BLD AUTO: 63 %
NONHDLC SERPL-MCNC: 121 MG/DL
PLATELET # BLD AUTO: 183 10E3/UL (ref 150–450)
POTASSIUM SERPL-SCNC: 4.5 MMOL/L (ref 3.4–5.3)
PROT SERPL-MCNC: 7.7 G/DL (ref 6.4–8.3)
RBC # BLD AUTO: 5.01 10E6/UL (ref 4.4–5.9)
SHBG SERPL-SCNC: 53 NMOL/L (ref 11–80)
SODIUM SERPL-SCNC: 139 MMOL/L (ref 135–145)
TRIGL SERPL-MCNC: 50 MG/DL
VIT D+METAB SERPL-MCNC: 50 NG/ML (ref 20–50)
WBC # BLD AUTO: 5.5 10E3/UL (ref 4–11)

## 2025-06-18 PROCEDURE — 3078F DIAST BP <80 MM HG: CPT | Performed by: FAMILY MEDICINE

## 2025-06-18 PROCEDURE — 80061 LIPID PANEL: CPT | Performed by: FAMILY MEDICINE

## 2025-06-18 PROCEDURE — 82306 VITAMIN D 25 HYDROXY: CPT | Performed by: FAMILY MEDICINE

## 2025-06-18 PROCEDURE — 90715 TDAP VACCINE 7 YRS/> IM: CPT | Performed by: FAMILY MEDICINE

## 2025-06-18 PROCEDURE — 85025 COMPLETE CBC W/AUTO DIFF WBC: CPT | Performed by: FAMILY MEDICINE

## 2025-06-18 PROCEDURE — 80053 COMPREHEN METABOLIC PANEL: CPT | Performed by: FAMILY MEDICINE

## 2025-06-18 PROCEDURE — 90471 IMMUNIZATION ADMIN: CPT | Performed by: FAMILY MEDICINE

## 2025-06-18 PROCEDURE — 3074F SYST BP LT 130 MM HG: CPT | Performed by: FAMILY MEDICINE

## 2025-06-18 PROCEDURE — 36415 COLL VENOUS BLD VENIPUNCTURE: CPT | Performed by: FAMILY MEDICINE

## 2025-06-18 PROCEDURE — 83036 HEMOGLOBIN GLYCOSYLATED A1C: CPT | Performed by: FAMILY MEDICINE

## 2025-06-18 PROCEDURE — 99395 PREV VISIT EST AGE 18-39: CPT | Mod: 25 | Performed by: FAMILY MEDICINE

## 2025-06-18 PROCEDURE — 84403 ASSAY OF TOTAL TESTOSTERONE: CPT | Performed by: FAMILY MEDICINE

## 2025-06-18 PROCEDURE — 1126F AMNT PAIN NOTED NONE PRSNT: CPT | Performed by: FAMILY MEDICINE

## 2025-06-18 PROCEDURE — 84270 ASSAY OF SEX HORMONE GLOBUL: CPT | Performed by: FAMILY MEDICINE

## 2025-06-18 RX ORDER — LORAZEPAM 1 MG/1
.5-1 TABLET ORAL EVERY 8 HOURS PRN
Qty: 60 TABLET | Refills: 2 | Status: SHIPPED | OUTPATIENT
Start: 2025-06-18

## 2025-06-18 RX ORDER — LORAZEPAM 1 MG/1
.5-1 TABLET ORAL EVERY 8 HOURS PRN
Qty: 60 TABLET | Refills: 2 | Status: SHIPPED | OUTPATIENT
Start: 2025-06-18 | End: 2025-06-18

## 2025-06-18 SDOH — HEALTH STABILITY: PHYSICAL HEALTH: ON AVERAGE, HOW MANY DAYS PER WEEK DO YOU ENGAGE IN MODERATE TO STRENUOUS EXERCISE (LIKE A BRISK WALK)?: 6 DAYS

## 2025-06-18 SDOH — HEALTH STABILITY: PHYSICAL HEALTH: ON AVERAGE, HOW MANY MINUTES DO YOU ENGAGE IN EXERCISE AT THIS LEVEL?: 60 MIN

## 2025-06-18 ASSESSMENT — SOCIAL DETERMINANTS OF HEALTH (SDOH): HOW OFTEN DO YOU GET TOGETHER WITH FRIENDS OR RELATIVES?: TWICE A WEEK

## 2025-06-18 ASSESSMENT — PAIN SCALES - GENERAL: PAINLEVEL_OUTOF10: NO PAIN (0)

## 2025-06-18 NOTE — NURSING NOTE
Prior to immunization administration, verified patients identity using patient s name and date of birth. Please see Immunization Activity for additional information.     Screening Questionnaire for Adult Immunization    Are you sick today?   No   Do you have allergies to medications, food, a vaccine component or latex?   No   Have you ever had a serious reaction after receiving a vaccination?   No   Do you have a long-term health problem with heart, lung, kidney, or metabolic disease (e.g., diabetes), asthma, a blood disorder, no spleen, complement component deficiency, a cochlear implant, or a spinal fluid leak?  Are you on long-term aspirin therapy?   No   Do you have cancer, leukemia, HIV/AIDS, or any other immune system problem?   No   Do you have a parent, brother, or sister with an immune system problem?   No   In the past 3 months, have you taken medications that affect  your immune system, such as prednisone, other steroids, or anticancer drugs; drugs for the treatment of rheumatoid arthritis, Crohn s disease, or psoriasis; or have you had radiation treatments?   No   Have you had a seizure, or a brain or other nervous system problem?   No   During the past year, have you received a transfusion of blood or blood    products, or been given immune (gamma) globulin or antiviral drug?   No   For women: Are you pregnant or is there a chance you could become       pregnant during the next month?   No   Have you received any vaccinations in the past 4 weeks?   No     Immunization questionnaire answers were all negative.      Patient instructed to remain in clinic for 15 minutes afterwards, and to report any adverse reactions.     Screening performed by Clarissa Ocasio MA on 6/18/2025 at 2:56 PM.

## 2025-06-18 NOTE — PROGRESS NOTES
Preventive Care Visit  [facility] GODFREY Altamirano MD, Family Medicine  Jun 18, 2025  {Provider  Link to SmartSet :126509}    {PROVIDER CHARTING PREFERENCE:611225}    Nadine Phelan is a 34 year old, presenting for the following:  Physical        6/18/2025     1:46 PM   Additional Questions   Roomed by Lulu Hernandez CMA   Accompanied by self         6/18/2025     1:46 PM   Patient Reported Additional Medications   Patient reports taking the following new medications none          HPI         Advance Care Planning  {The storyboard will display whether the patient has ACP docs on file. Hover over the Code section in the storyboard to access the ACP documents. :625120}  {(AWV REQUIRED) Advance Care Planning Reviewed:282254}        6/18/2025   General Health   How would you rate your overall physical health? Excellent   Feel stress (tense, anxious, or unable to sleep) Rather much   (!) STRESS CONCERN      6/18/2025   Nutrition   Three or more servings of calcium each day? Yes   Diet: Regular (no restrictions)   How many servings of fruit and vegetables per day? 4 or more   How many sweetened beverages each day? 0-1         6/18/2025   Exercise   Days per week of moderate/strenous exercise 6 days   Average minutes spent exercising at this level 60 min         6/18/2025   Social Factors   Frequency of gathering with friends or relatives Twice a week   Worry food won't last until get money to buy more No   Food not last or not have enough money for food? No   Do you have housing? (Housing is defined as stable permanent housing and does not include staying outside in a car, in a tent, in an abandoned building, in an overnight shelter, or couch-surfing.) No   Are you worried about losing your housing? No   Lack of transportation? No   Unable to get utilities (heat,electricity)? No   Want help with housing or utility concern? No   (!) HOUSING CONCERN PRESENT      6/18/2025   Dental   Dentist two  times every year? Yes       { Rooming Staff Patient needs a PHQ as part of the AWV.  Use this link to complete and then refresh the note to pull results Link to PHQ9 Assessment :026162}  {USE TO PULL IN PHQ RESULTS FOR TODAY:738206}        6/18/2025   Substance Use   Alcohol more than 3/day or more than 7/wk Not Applicable   Do you use any other substances recreationally? (!) DECLINE     Social History     Tobacco Use    Smoking status: Never     Passive exposure: Never    Smokeless tobacco: Never    Tobacco comments:     no nicotine exposure   Vaping Use    Vaping status: Never Used   Substance Use Topics    Alcohol use: Not Currently    Drug use: Not Currently     Types: Marijuana     {Provider  If there are gaps in the social history shown above, please follow the link to update and then refresh the note Link to Social and Substance History :295425}      6/18/2025   STI Screening   New sexual partner(s) since last STI/HIV test? No         6/18/2025   Contraception/Family Planning   Questions about contraception or family planning No     {Provider  REQUIRED FOR AWV Use the storyboard to review patient history, after sections have been marked as reviewed, refresh note to capture documentation:613005}   Reviewed and updated as needed this visit by Provider                    Past Medical History:   Diagnosis Date    Adjustment disorder with anxiety 09/01/2010    Closed fracture of tibia 08/07/2006    Depressive disorder 2010    After friend passed away    Knee injury, left, initial encounter 03/22/2018    Labral tear of shoulder, right, initial encounter 02/11/2019    Tympanic membrane perforation, right 06/27/2016     Past Surgical History:   Procedure Laterality Date    PE TUBES  1998    P.E. Tubes    SHOULDER SURGERY Right     TYMPANOPLASTY Right 07/21/2016    Procedure: TYMPANOPLASTY;  Surgeon: Andres Joyce MD;  Location:  OR       {[facility] (Optional):162606}     Objective    Exam  /72   " Pulse 63   Temp 98.9  F (37.2  C) (Temporal)   Resp 16   Ht 1.806 m (5' 11.1\")   Wt 87.5 kg (193 lb)   SpO2 98%   BMI 26.84 kg/m     Estimated body mass index is 26.84 kg/m  as calculated from the following:    Height as of this encounter: 1.806 m (5' 11.1\").    Weight as of this encounter: 87.5 kg (193 lb).    Physical Exam  {Exam Choices (Optional):848889}        Signed Electronically by: Humberto Altamirano MD  {Email feedback regarding this note to [email or web address]   :542288}  " Weight as of this encounter: 87.5 kg (193 lb).    Physical Exam  GENERAL: alert and no distress  EYES: Eyes grossly normal to inspection, PERRL and conjunctivae and sclerae normal  HENT: ear canals and TM's normal, nose and mouth without ulcers or lesions  NECK: no adenopathy, no asymmetry, masses, or scars  RESP: lungs clear to auscultation - no rales, rhonchi or wheezes  CV: regular rate and rhythm, normal S1 S2, no S3 or S4, no murmur, click or rub, no peripheral edema  ABDOMEN: soft, nontender, no hepatosplenomegaly, no masses and bowel sounds normal  MS: no gross musculoskeletal defects noted, no edema  NEURO: Normal strength and tone, mentation intact and speech normal  PSYCH: mentation appears normal, affect normal/bright        Signed Electronically by: Humberto Altamirano MD

## 2025-06-18 NOTE — PATIENT INSTRUCTIONS
Patient Education   Preventive Care Advice   This is general advice given by our system to help you stay healthy. However, your care team may have specific advice just for you. Please talk to your care team about your preventive care needs.  Nutrition  Eat 5 or more servings of fruits and vegetables each day.  Try wheat bread, brown rice and whole grain pasta (instead of white bread, rice, and pasta).  Get enough calcium and vitamin D. Check the label on foods and aim for 100% of the RDA (recommended daily allowance).  Lifestyle  Exercise at least 150 minutes each week  (30 minutes a day, 5 days a week).  Do muscle strengthening activities 2 days a week. These help control your weight and prevent disease.  No smoking.  Wear sunscreen to prevent skin cancer.  Have a dental exam and cleaning every 6 months.  Yearly exams  See your health care team every year to talk about:  Any changes in your health.  Any medicines your care team has prescribed.  Preventive care, family planning, and ways to prevent chronic diseases.  Shots (vaccines)   HPV shots (up to age 26), if you've never had them before.  Hepatitis B shots (up to age 59), if you've never had them before.  COVID-19 shot: Get this shot when it's due.  Flu shot: Get a flu shot every year.  Tetanus shot: Get a tetanus shot every 10 years.  Pneumococcal, hepatitis A, and RSV shots: Ask your care team if you need these based on your risk.  Shingles shot (for age 50 and up)  General health tests  Diabetes screening:  Starting at age 35, Get screened for diabetes at least every 3 years.  If you are younger than age 35, ask your care team if you should be screened for diabetes.  Cholesterol test: At age 39, start having a cholesterol test every 5 years, or more often if advised.  Bone density scan (DEXA): At age 50, ask your care team if you should have this scan for osteoporosis (brittle bones).  Hepatitis C: Get tested at least once in your life.  STIs (sexually  transmitted infections)  Before age 24: Ask your care team if you should be screened for STIs.  After age 24: Get screened for STIs if you're at risk. You are at risk for STIs (including HIV) if:  You are sexually active with more than one person.  You don't use condoms every time.  You or a partner was diagnosed with a sexually transmitted infection.  If you are at risk for HIV, ask about PrEP medicine to prevent HIV.  Get tested for HIV at least once in your life, whether you are at risk for HIV or not.  Cancer screening tests  Cervical cancer screening: If you have a cervix, begin getting regular cervical cancer screening tests starting at age 21.  Breast cancer scan (mammogram): If you've ever had breasts, begin having regular mammograms starting at age 40. This is a scan to check for breast cancer.  Colon cancer screening: It is important to start screening for colon cancer at age 45.  Have a colonoscopy test every 10 years (or more often if you're at risk) Or, ask your provider about stool tests like a FIT test every year or Cologuard test every 3 years.  To learn more about your testing options, visit:   .  For help making a decision, visit:   https://bit.ly/zd60302.  Prostate cancer screening test: If you have a prostate, ask your care team if a prostate cancer screening test (PSA) at age 55 is right for you.  Lung cancer screening: If you are a current or former smoker ages 50 to 80, ask your care team if ongoing lung cancer screenings are right for you.  For informational purposes only. Not to replace the advice of your health care provider. Copyright   2023 [facility]. All rights reserved. Clinically reviewed by the [facility] Transitions Program. [employer] 962943 - REV 01/24.  Learning About Stress  What is stress?     Stress is your body's response to a hard situation. Your body can have a physical, emotional, or mental response. Stress is a fact of life for most people, and it  affects everyone differently. What causes stress for you may not be stressful for someone else.  A lot of things can cause stress. You may feel stress when you go on a job interview, take a test, or run a race. This kind of short-term stress is normal and even useful. It can help you if you need to work hard or react quickly. For example, stress can help you finish an important job on time.  Long-term stress is caused by ongoing stressful situations or events. Examples of long-term stress include long-term health problems, ongoing problems at work, or conflicts in your family. Long-term stress can harm your health.  How does stress affect your health?  When you are stressed, your body responds as though you are in danger. It makes hormones that speed up your heart, make you breathe faster, and give you a burst of energy. This is called the fight-or-flight stress response. If the stress is over quickly, your body goes back to normal and no harm is done.  But if stress happens too often or lasts too long, it can have bad effects. Long-term stress can make you more likely to get sick, and it can make symptoms of some diseases worse. If you tense up when you are stressed, you may develop neck, shoulder, or low back pain. Stress is linked to high blood pressure and heart disease.  Stress also harms your emotional health. It can make you fields, tense, or depressed. Your relationships may suffer, and you may not do well at work or school.  What can you do to manage stress?  You can try these things to help manage stress:   Do something active. Exercise or activity can help reduce stress. Walking is a great way to get started. Even everyday activities such as housecleaning or yard work can help.  Try yoga or garrett chi. These techniques combine exercise and meditation. You may need some training at first to learn them.  Do something you enjoy. For example, listen to music or go to a movie. Practice your hobby or do volunteer  "work.  Meditate. This can help you relax, because you are not worrying about what happened before or what may happen in the future.  Do guided imagery. Imagine yourself in any setting that helps you feel calm. You can use online videos, books, or a teacher to guide you.  Do breathing exercises. For example:  From a standing position, bend forward from the waist with your knees slightly bent. Let your arms dangle close to the floor.  Breathe in slowly and deeply as you return to a standing position. Roll up slowly and lift your head last.  Hold your breath for just a few seconds in the standing position.  Breathe out slowly and bend forward from the waist.  Let your feelings out. Talk, laugh, cry, and express anger when you need to. Talking with supportive friends or family, a counselor, or a jimmy leader about your feelings is a healthy way to relieve stress. Avoid discussing your feelings with people who make you feel worse.  Write. It may help to write about things that are bothering you. This helps you find out how much stress you feel and what is causing it. When you know this, you can find better ways to cope.  What can you do to prevent stress?  You might try some of these things to help prevent stress:  Manage your time. This helps you find time to do the things you want and need to do.  Get enough sleep. Your body recovers from the stresses of the day while you are sleeping.  Get support. Your family, friends, and community can make a difference in how you experience stress.  Limit your news feed. Avoid or limit time on social media or news that may make you feel stressed.  Do something active. Exercise or activity can help reduce stress. Walking is a great way to get started.  Where can you learn more?  Go to https://www.[employer].net/patiented  Enter N032 in the search box to learn more about \"Learning About Stress.\"  Current as of: October 24, 2024  Content Version: 14.5 2024-2025 [email or web address]   Care instructions adapted under license by your healthcare professional. If you have questions about a medical condition or this instruction, always ask your healthcare professional. "[employer] disclaims any warranty or liability for your use of this information.       Patient Education   Preventive Care Advice   This is general advice given by our system to help you stay healthy. However, your care team may have specific advice just for you. Please talk to your care team about your preventive care needs.  Nutrition  Eat 5 or more servings of fruits and vegetables each day.  Try wheat bread, brown rice and whole grain pasta (instead of white bread, rice, and pasta).  Get enough calcium and vitamin D. Check the label on foods and aim for 100% of the RDA (recommended daily allowance).  Lifestyle  Exercise at least 150 minutes each week  (30 minutes a day, 5 days a week).  Do muscle strengthening activities 2 days a week. These help control your weight and prevent disease.  No smoking.  Wear sunscreen to prevent skin cancer.  Have a dental exam and cleaning every 6 months.  Yearly exams  See your health care team every year to talk about:  Any changes in your health.  Any medicines your care team has prescribed.  Preventive care, family planning, and ways to prevent chronic diseases.  Shots (vaccines)   HPV shots (up to age 26), if you've never had them before.  Hepatitis B shots (up to age 59), if you've never had them before.  COVID-19 shot: Get this shot when it's due.  Flu shot: Get a flu shot every year.  Tetanus shot: Get a tetanus shot every 10 years.  Pneumococcal, hepatitis A, and RSV shots: Ask your care team if you need these based on your risk.  Shingles shot (for age 50 and up)  General health tests  Diabetes screening:  Starting at age 35, Get screened for diabetes at least every 3 years.  If you are younger than age 35, ask your care team if you should be screened for  diabetes.  Cholesterol test: At age 39, start having a cholesterol test every 5 years, or more often if advised.  Bone density scan (DEXA): At age 50, ask your care team if you should have this scan for osteoporosis (brittle bones).  Hepatitis C: Get tested at least once in your life.  STIs (sexually transmitted infections)  Before age 24: Ask your care team if you should be screened for STIs.  After age 24: Get screened for STIs if you're at risk. You are at risk for STIs (including HIV) if:  You are sexually active with more than one person.  You don't use condoms every time.  You or a partner was diagnosed with a sexually transmitted infection.  If you are at risk for HIV, ask about PrEP medicine to prevent HIV.  Get tested for HIV at least once in your life, whether you are at risk for HIV or not.  Cancer screening tests  Cervical cancer screening: If you have a cervix, begin getting regular cervical cancer screening tests starting at age 21.  Breast cancer scan (mammogram): If you've ever had breasts, begin having regular mammograms starting at age 40. This is a scan to check for breast cancer.  Colon cancer screening: It is important to start screening for colon cancer at age 45.  Have a colonoscopy test every 10 years (or more often if you're at risk) Or, ask your provider about stool tests like a FIT test every year or Cologuard test every 3 years.  To learn more about your testing options, visit:   .  For help making a decision, visit:   https://bit.ly/ht28435.  Prostate cancer screening test: If you have a prostate, ask your care team if a prostate cancer screening test (PSA) at age 55 is right for you.  Lung cancer screening: If you are a current or former smoker ages 50 to 80, ask your care team if ongoing lung cancer screenings are right for you.  For informational purposes only. Not to replace the advice of your health care provider. Copyright   2023 [employer]. All rights reserved.  Clinically reviewed by the [facility] Transitions Program. [employer] [id number] - REV 01/24.  Learning About Stress  What is stress?     Stress is your body's response to a hard situation. Your body can have a physical, emotional, or mental response. Stress is a fact of life for most people, and it affects everyone differently. What causes stress for you may not be stressful for someone else.  A lot of things can cause stress. You may feel stress when you go on a job interview, take a test, or run a race. This kind of short-term stress is normal and even useful. It can help you if you need to work hard or react quickly. For example, stress can help you finish an important job on time.  Long-term stress is caused by ongoing stressful situations or events. Examples of long-term stress include long-term health problems, ongoing problems at work, or conflicts in your family. Long-term stress can harm your health.  How does stress affect your health?  When you are stressed, your body responds as though you are in danger. It makes hormones that speed up your heart, make you breathe faster, and give you a burst of energy. This is called the fight-or-flight stress response. If the stress is over quickly, your body goes back to normal and no harm is done.  But if stress happens too often or lasts too long, it can have bad effects. Long-term stress can make you more likely to get sick, and it can make symptoms of some diseases worse. If you tense up when you are stressed, you may develop neck, shoulder, or low back pain. Stress is linked to high blood pressure and heart disease.  Stress also harms your emotional health. It can make you fields, tense, or depressed. Your relationships may suffer, and you may not do well at work or school.  What can you do to manage stress?  You can try these things to help manage stress:   Do something active. Exercise or activity can help reduce stress. Walking is a great way to get  started. Even everyday activities such as housecleaning or yard work can help.  Try yoga or garrett chi. These techniques combine exercise and meditation. You may need some training at first to learn them.  Do something you enjoy. For example, listen to music or go to a movie. Practice your hobby or do volunteer work.  Meditate. This can help you relax, because you are not worrying about what happened before or what may happen in the future.  Do guided imagery. Imagine yourself in any setting that helps you feel calm. You can use online videos, books, or a teacher to guide you.  Do breathing exercises. For example:  From a standing position, bend forward from the waist with your knees slightly bent. Let your arms dangle close to the floor.  Breathe in slowly and deeply as you return to a standing position. Roll up slowly and lift your head last.  Hold your breath for just a few seconds in the standing position.  Breathe out slowly and bend forward from the waist.  Let your feelings out. Talk, laugh, cry, and express anger when you need to. Talking with supportive friends or family, a counselor, or a jimmy leader about your feelings is a healthy way to relieve stress. Avoid discussing your feelings with people who make you feel worse.  Write. It may help to write about things that are bothering you. This helps you find out how much stress you feel and what is causing it. When you know this, you can find better ways to cope.  What can you do to prevent stress?  You might try some of these things to help prevent stress:  Manage your time. This helps you find time to do the things you want and need to do.  Get enough sleep. Your body recovers from the stresses of the day while you are sleeping.  Get support. Your family, friends, and community can make a difference in how you experience stress.  Limit your news feed. Avoid or limit time on social media or news that may make you feel stressed.  Do something active. Exercise  "or activity can help reduce stress. Walking is a great way to get started.  Where can you learn more?  Go to https://www.[employer].net/patiented  Enter N032 in the search box to learn more about \"Learning About Stress.\"  Current as of: October 24, 2024  Content Version: 14.5 2024-2025 [employer].   Care instructions adapted under license by your healthcare professional. If you have questions about a medical condition or this instruction, always ask your healthcare professional. [employer] disclaims any warranty or liability for your use of this information.       "

## 2025-06-19 ENCOUNTER — PATIENT OUTREACH (OUTPATIENT)
Dept: CARE COORDINATION | Facility: CLINIC | Age: 35
End: 2025-06-19
Payer: COMMERCIAL

## 2025-06-21 LAB
TESTOST FREE SERPL-MCNC: 10.37 NG/DL
TESTOST SERPL-MCNC: 647 NG/DL (ref 240–950)

## 2025-06-23 ENCOUNTER — PATIENT OUTREACH (OUTPATIENT)
Dept: CARE COORDINATION | Facility: CLINIC | Age: 35
End: 2025-06-23
Payer: COMMERCIAL

## 2025-08-25 ASSESSMENT — SLEEP AND FATIGUE QUESTIONNAIRES
HOW LIKELY ARE YOU TO NOD OFF OR FALL ASLEEP WHEN YOU ARE A PASSENGER IN A CAR FOR AN HOUR WITHOUT A BREAK: SLIGHT CHANCE OF DOZING
HOW LIKELY ARE YOU TO NOD OFF OR FALL ASLEEP WHILE SITTING AND TALKING TO SOMEONE: WOULD NEVER DOZE
HOW LIKELY ARE YOU TO NOD OFF OR FALL ASLEEP WHILE LYING DOWN TO REST IN THE AFTERNOON WHEN CIRCUMSTANCES PERMIT: HIGH CHANCE OF DOZING
HOW LIKELY ARE YOU TO NOD OFF OR FALL ASLEEP WHILE WATCHING TV: SLIGHT CHANCE OF DOZING
HOW LIKELY ARE YOU TO NOD OFF OR FALL ASLEEP WHILE SITTING AND READING: SLIGHT CHANCE OF DOZING
HOW LIKELY ARE YOU TO NOD OFF OR FALL ASLEEP IN A CAR, WHILE STOPPED FOR A FEW MINUTES IN TRAFFIC: WOULD NEVER DOZE
HOW LIKELY ARE YOU TO NOD OFF OR FALL ASLEEP WHILE SITTING QUIETLY AFTER LUNCH WITHOUT ALCOHOL: SLIGHT CHANCE OF DOZING
HOW LIKELY ARE YOU TO NOD OFF OR FALL ASLEEP WHILE SITTING INACTIVE IN A PUBLIC PLACE: WOULD NEVER DOZE

## 2025-08-27 ENCOUNTER — VIRTUAL VISIT (OUTPATIENT)
Dept: SLEEP MEDICINE | Facility: CLINIC | Age: 35
End: 2025-08-27
Payer: COMMERCIAL

## 2025-08-27 VITALS — BODY MASS INDEX: 25.06 KG/M2 | HEIGHT: 72 IN | WEIGHT: 185 LBS

## 2025-08-27 DIAGNOSIS — F51.04 PSYCHOPHYSIOLOGICAL INSOMNIA: ICD-10-CM

## 2025-08-27 DIAGNOSIS — G47.21 CIRCADIAN RHYTHM SLEEP DISORDER, DELAYED SLEEP PHASE TYPE: ICD-10-CM

## 2025-08-27 DIAGNOSIS — R29.818 SUSPECTED SLEEP APNEA: ICD-10-CM

## 2025-08-27 DIAGNOSIS — F45.8 BRUXISM: ICD-10-CM

## 2025-08-27 DIAGNOSIS — G47.8 UNREFRESHED BY SLEEP: ICD-10-CM

## 2025-08-27 DIAGNOSIS — R53.83 FATIGUE, UNSPECIFIED TYPE: ICD-10-CM

## 2025-08-27 DIAGNOSIS — F51.5 NIGHTMARES: ICD-10-CM

## 2025-08-27 DIAGNOSIS — G47.09 OTHER INSOMNIA: ICD-10-CM

## 2025-08-27 DIAGNOSIS — G47.52 DREAM ENACTMENT BEHAVIOR: ICD-10-CM

## 2025-08-27 DIAGNOSIS — F33.1 MODERATE EPISODE OF RECURRENT MAJOR DEPRESSIVE DISORDER (H): Chronic | ICD-10-CM

## 2025-08-27 DIAGNOSIS — Z72.821 INADEQUATE SLEEP HYGIENE: ICD-10-CM

## 2025-08-27 DIAGNOSIS — F43.10 PTSD (POST-TRAUMATIC STRESS DISORDER): ICD-10-CM

## 2025-08-27 DIAGNOSIS — R06.81 WITNESSED APNEIC SPELLS: ICD-10-CM

## 2025-08-27 DIAGNOSIS — F41.1 GAD (GENERALIZED ANXIETY DISORDER): Chronic | ICD-10-CM

## 2025-08-27 DIAGNOSIS — F51.04 CHRONIC INSOMNIA: Primary | ICD-10-CM

## 2025-08-27 PROCEDURE — 1125F AMNT PAIN NOTED PAIN PRSNT: CPT | Performed by: INTERNAL MEDICINE

## 2025-08-27 PROCEDURE — 98003 SYNCH AUDIO-VIDEO NEW HI 60: CPT | Performed by: INTERNAL MEDICINE

## 2025-08-27 ASSESSMENT — PAIN SCALES - GENERAL: PAINLEVEL_OUTOF10: MILD PAIN (2)

## 2025-08-28 ENCOUNTER — PATIENT OUTREACH (OUTPATIENT)
Dept: CARE COORDINATION | Facility: CLINIC | Age: 35
End: 2025-08-28
Payer: COMMERCIAL

## 2025-09-03 ENCOUNTER — VIRTUAL VISIT (OUTPATIENT)
Dept: PHARMACY | Facility: CLINIC | Age: 35
End: 2025-09-03
Payer: COMMERCIAL

## 2025-09-03 DIAGNOSIS — F33.9 MDD (MAJOR DEPRESSIVE DISORDER), RECURRENT EPISODE: ICD-10-CM

## 2025-09-03 DIAGNOSIS — F41.1 GAD (GENERALIZED ANXIETY DISORDER): Primary | ICD-10-CM

## 2025-09-03 DIAGNOSIS — F41.1 GAD (GENERALIZED ANXIETY DISORDER): Primary | Chronic | ICD-10-CM

## 2025-09-03 PROCEDURE — 99207 PR NO CHARGE LOS: CPT | Mod: 95 | Performed by: PHARMACIST

## 2025-09-03 RX ORDER — HYDROXYZINE HYDROCHLORIDE 25 MG/1
25-50 TABLET, FILM COATED ORAL 2 TIMES DAILY PRN
Qty: 60 TABLET | Refills: 1 | Status: SHIPPED | OUTPATIENT
Start: 2025-09-03